# Patient Record
Sex: MALE | Race: AMERICAN INDIAN OR ALASKA NATIVE | NOT HISPANIC OR LATINO | URBAN - METROPOLITAN AREA
[De-identification: names, ages, dates, MRNs, and addresses within clinical notes are randomized per-mention and may not be internally consistent; named-entity substitution may affect disease eponyms.]

---

## 2023-01-19 ENCOUNTER — INPATIENT (INPATIENT)
Facility: HOSPITAL | Age: 78
LOS: 6 days | Discharge: HOME | End: 2023-01-26
Attending: INTERNAL MEDICINE | Admitting: INTERNAL MEDICINE
Payer: MEDICARE

## 2023-01-19 VITALS
TEMPERATURE: 98 F | SYSTOLIC BLOOD PRESSURE: 124 MMHG | OXYGEN SATURATION: 99 % | DIASTOLIC BLOOD PRESSURE: 60 MMHG | RESPIRATION RATE: 18 BRPM | HEART RATE: 52 BPM

## 2023-01-19 LAB
ACANTHOCYTES BLD QL SMEAR: SLIGHT — SIGNIFICANT CHANGE UP
ALBUMIN SERPL ELPH-MCNC: 3.5 G/DL — SIGNIFICANT CHANGE UP (ref 3.5–5.2)
ALP SERPL-CCNC: 78 U/L — SIGNIFICANT CHANGE UP (ref 30–115)
ALT FLD-CCNC: 8 U/L — SIGNIFICANT CHANGE UP (ref 0–41)
ANION GAP SERPL CALC-SCNC: 10 MMOL/L — SIGNIFICANT CHANGE UP (ref 7–14)
ANION GAP SERPL CALC-SCNC: 7 MMOL/L — SIGNIFICANT CHANGE UP (ref 7–14)
ANISOCYTOSIS BLD QL: SLIGHT — SIGNIFICANT CHANGE UP
APPEARANCE UR: CLEAR — SIGNIFICANT CHANGE UP
APTT BLD: 35.8 SEC — SIGNIFICANT CHANGE UP (ref 27–39.2)
AST SERPL-CCNC: 26 U/L — SIGNIFICANT CHANGE UP (ref 0–41)
BASE EXCESS BLDV CALC-SCNC: -4.6 MMOL/L — LOW (ref -2–3)
BASE EXCESS BLDV CALC-SCNC: -5.5 MMOL/L — LOW (ref -2–3)
BASOPHILS # BLD AUTO: 0 K/UL — SIGNIFICANT CHANGE UP (ref 0–0.2)
BASOPHILS NFR BLD AUTO: 0 % — SIGNIFICANT CHANGE UP (ref 0–1)
BILIRUB SERPL-MCNC: 0.4 MG/DL — SIGNIFICANT CHANGE UP (ref 0.2–1.2)
BILIRUB UR-MCNC: NEGATIVE — SIGNIFICANT CHANGE UP
BUN SERPL-MCNC: 18 MG/DL — SIGNIFICANT CHANGE UP (ref 10–20)
BUN SERPL-MCNC: 19 MG/DL — SIGNIFICANT CHANGE UP (ref 10–20)
CA-I SERPL-SCNC: 1.16 MMOL/L — SIGNIFICANT CHANGE UP (ref 1.15–1.33)
CA-I SERPL-SCNC: 1.18 MMOL/L — SIGNIFICANT CHANGE UP (ref 1.15–1.33)
CALCIUM SERPL-MCNC: 8.2 MG/DL — LOW (ref 8.4–10.4)
CALCIUM SERPL-MCNC: 8.3 MG/DL — LOW (ref 8.4–10.5)
CHLORIDE SERPL-SCNC: 91 MMOL/L — LOW (ref 98–110)
CHLORIDE SERPL-SCNC: 92 MMOL/L — LOW (ref 98–110)
CO2 SERPL-SCNC: 18 MMOL/L — SIGNIFICANT CHANGE UP (ref 17–32)
CO2 SERPL-SCNC: 21 MMOL/L — SIGNIFICANT CHANGE UP (ref 17–32)
COLOR SPEC: COLORLESS — SIGNIFICANT CHANGE UP
CREAT SERPL-MCNC: 2 MG/DL — HIGH (ref 0.7–1.5)
CREAT SERPL-MCNC: 2 MG/DL — HIGH (ref 0.7–1.5)
D DIMER BLD IA.RAPID-MCNC: 345 NG/ML DDU — HIGH
D DIMER BLD IA.RAPID-MCNC: 351 NG/ML DDU — HIGH
DIFF PNL FLD: NEGATIVE — SIGNIFICANT CHANGE UP
EGFR: 34 ML/MIN/1.73M2 — LOW
EGFR: 34 ML/MIN/1.73M2 — LOW
EOSINOPHIL # BLD AUTO: 0 K/UL — SIGNIFICANT CHANGE UP (ref 0–0.7)
EOSINOPHIL NFR BLD AUTO: 0 % — SIGNIFICANT CHANGE UP (ref 0–8)
GAS PNL BLDV: 116 MMOL/L — CRITICAL LOW (ref 136–145)
GAS PNL BLDV: 119 MMOL/L — CRITICAL LOW (ref 136–145)
GAS PNL BLDV: SIGNIFICANT CHANGE UP
GIANT PLATELETS BLD QL SMEAR: PRESENT — SIGNIFICANT CHANGE UP
GLUCOSE SERPL-MCNC: 107 MG/DL — HIGH (ref 70–99)
GLUCOSE SERPL-MCNC: 82 MG/DL — SIGNIFICANT CHANGE UP (ref 70–99)
GLUCOSE UR QL: NEGATIVE — SIGNIFICANT CHANGE UP
HCO3 BLDV-SCNC: 19 MMOL/L — LOW (ref 22–29)
HCO3 BLDV-SCNC: 20 MMOL/L — LOW (ref 22–29)
HCT VFR BLD CALC: 26.7 % — LOW (ref 42–52)
HCT VFR BLDA CALC: 28 % — LOW (ref 39–51)
HCT VFR BLDA CALC: 28 % — LOW (ref 39–51)
HGB BLD CALC-MCNC: 9.4 G/DL — LOW (ref 12.6–17.4)
HGB BLD CALC-MCNC: 9.4 G/DL — LOW (ref 12.6–17.4)
HGB BLD-MCNC: 9.4 G/DL — LOW (ref 14–18)
INR BLD: 1.02 RATIO — SIGNIFICANT CHANGE UP (ref 0.65–1.3)
KETONES UR-MCNC: NEGATIVE — SIGNIFICANT CHANGE UP
LACTATE BLDV-MCNC: 0.7 MMOL/L — SIGNIFICANT CHANGE UP (ref 0.5–2)
LACTATE BLDV-MCNC: 0.8 MMOL/L — SIGNIFICANT CHANGE UP (ref 0.5–2)
LEUKOCYTE ESTERASE UR-ACNC: NEGATIVE — SIGNIFICANT CHANGE UP
LIDOCAIN IGE QN: 66 U/L — HIGH (ref 7–60)
LYMPHOCYTES # BLD AUTO: 0.16 K/UL — LOW (ref 1.2–3.4)
LYMPHOCYTES # BLD AUTO: 4.4 % — LOW (ref 20.5–51.1)
MACROCYTES BLD QL: SLIGHT — SIGNIFICANT CHANGE UP
MANUAL SMEAR VERIFICATION: SIGNIFICANT CHANGE UP
MCHC RBC-ENTMCNC: 32 PG — HIGH (ref 27–31)
MCHC RBC-ENTMCNC: 35.2 G/DL — SIGNIFICANT CHANGE UP (ref 32–37)
MCV RBC AUTO: 90.8 FL — SIGNIFICANT CHANGE UP (ref 80–94)
MONOCYTES # BLD AUTO: 0.49 K/UL — SIGNIFICANT CHANGE UP (ref 0.1–0.6)
MONOCYTES NFR BLD AUTO: 13.3 % — HIGH (ref 1.7–9.3)
NEUTROPHILS # BLD AUTO: 3.01 K/UL — SIGNIFICANT CHANGE UP (ref 1.4–6.5)
NEUTROPHILS NFR BLD AUTO: 82.3 % — HIGH (ref 42.2–75.2)
NITRITE UR-MCNC: NEGATIVE — SIGNIFICANT CHANGE UP
NT-PROBNP SERPL-SCNC: HIGH PG/ML (ref 0–300)
PCO2 BLDV: 33 MMHG — LOW (ref 42–55)
PCO2 BLDV: 36 MMHG — LOW (ref 42–55)
PH BLDV: 7.36 — SIGNIFICANT CHANGE UP (ref 7.32–7.43)
PH BLDV: 7.37 — SIGNIFICANT CHANGE UP (ref 7.32–7.43)
PH UR: 6.5 — SIGNIFICANT CHANGE UP (ref 5–8)
PLAT MORPH BLD: NORMAL — SIGNIFICANT CHANGE UP
PLATELET # BLD AUTO: 97 K/UL — LOW (ref 130–400)
PLATELET COUNT - ESTIMATE: ABNORMAL
PO2 BLDV: 60 MMHG — SIGNIFICANT CHANGE UP
PO2 BLDV: 76 MMHG — SIGNIFICANT CHANGE UP
POIKILOCYTOSIS BLD QL AUTO: SLIGHT — SIGNIFICANT CHANGE UP
POLYCHROMASIA BLD QL SMEAR: SLIGHT — SIGNIFICANT CHANGE UP
POTASSIUM BLDV-SCNC: 4 MMOL/L — SIGNIFICANT CHANGE UP (ref 3.5–5.1)
POTASSIUM BLDV-SCNC: 4.3 MMOL/L — SIGNIFICANT CHANGE UP (ref 3.5–5.1)
POTASSIUM SERPL-MCNC: 3.9 MMOL/L — SIGNIFICANT CHANGE UP (ref 3.5–5)
POTASSIUM SERPL-MCNC: 4.3 MMOL/L — SIGNIFICANT CHANGE UP (ref 3.5–5)
POTASSIUM SERPL-SCNC: 3.9 MMOL/L — SIGNIFICANT CHANGE UP (ref 3.5–5)
POTASSIUM SERPL-SCNC: 4.3 MMOL/L — SIGNIFICANT CHANGE UP (ref 3.5–5)
PROT SERPL-MCNC: 6 G/DL — SIGNIFICANT CHANGE UP (ref 6–8)
PROT UR-MCNC: SIGNIFICANT CHANGE UP
PROTHROM AB SERPL-ACNC: 11.6 SEC — SIGNIFICANT CHANGE UP (ref 9.95–12.87)
RBC # BLD: 2.94 M/UL — LOW (ref 4.7–6.1)
RBC # FLD: 13 % — SIGNIFICANT CHANGE UP (ref 11.5–14.5)
RBC BLD AUTO: ABNORMAL
SAO2 % BLDV: 92 % — SIGNIFICANT CHANGE UP
SAO2 % BLDV: 97.1 % — SIGNIFICANT CHANGE UP
SARS-COV-2 RNA SPEC QL NAA+PROBE: DETECTED
SODIUM SERPL-SCNC: 119 MMOL/L — CRITICAL LOW (ref 135–146)
SODIUM SERPL-SCNC: 120 MMOL/L — LOW (ref 135–146)
SP GR SPEC: 1.01 — SIGNIFICANT CHANGE UP (ref 1.01–1.03)
TROPONIN T SERPL-MCNC: <0.01 NG/ML — SIGNIFICANT CHANGE UP
UROBILINOGEN FLD QL: SIGNIFICANT CHANGE UP
WBC # BLD: 3.66 K/UL — LOW (ref 4.8–10.8)
WBC # FLD AUTO: 3.66 K/UL — LOW (ref 4.8–10.8)

## 2023-01-19 PROCEDURE — 70450 CT HEAD/BRAIN W/O DYE: CPT | Mod: 26,MA

## 2023-01-19 PROCEDURE — 93010 ELECTROCARDIOGRAM REPORT: CPT

## 2023-01-19 PROCEDURE — 71275 CT ANGIOGRAPHY CHEST: CPT | Mod: 26,MA

## 2023-01-19 PROCEDURE — 74177 CT ABD & PELVIS W/CONTRAST: CPT | Mod: 26,MA

## 2023-01-19 PROCEDURE — 71045 X-RAY EXAM CHEST 1 VIEW: CPT | Mod: 26

## 2023-01-19 PROCEDURE — 99223 1ST HOSP IP/OBS HIGH 75: CPT

## 2023-01-19 PROCEDURE — 99285 EMERGENCY DEPT VISIT HI MDM: CPT | Mod: CS,GC

## 2023-01-19 RX ORDER — PANTOPRAZOLE SODIUM 20 MG/1
40 TABLET, DELAYED RELEASE ORAL
Refills: 0 | Status: DISCONTINUED | OUTPATIENT
Start: 2023-01-19 | End: 2023-01-26

## 2023-01-19 RX ORDER — SODIUM CHLORIDE 9 MG/ML
1000 INJECTION INTRAMUSCULAR; INTRAVENOUS; SUBCUTANEOUS
Refills: 0 | Status: DISCONTINUED | OUTPATIENT
Start: 2023-01-19 | End: 2023-01-20

## 2023-01-19 RX ORDER — SODIUM CHLORIDE 9 MG/ML
1000 INJECTION INTRAMUSCULAR; INTRAVENOUS; SUBCUTANEOUS ONCE
Refills: 0 | Status: COMPLETED | OUTPATIENT
Start: 2023-01-19 | End: 2023-01-19

## 2023-01-19 RX ADMIN — SODIUM CHLORIDE 1000 MILLILITER(S): 9 INJECTION INTRAMUSCULAR; INTRAVENOUS; SUBCUTANEOUS at 12:51

## 2023-01-19 RX ADMIN — SODIUM CHLORIDE 75 MILLILITER(S): 9 INJECTION INTRAMUSCULAR; INTRAVENOUS; SUBCUTANEOUS at 16:48

## 2023-01-19 NOTE — CONSULT NOTE ADULT - ATTENDING COMMENTS
IMPRESSION:  Hyponatremia  Covid +  Pancytopenia  KOBE vs CKD   ?Brain Bleed  HO CAD  HO CHF  HO CAD    Impression and plan are my own.

## 2023-01-19 NOTE — CONSULT NOTE ADULT - SUBJECTIVE AND OBJECTIVE BOX
**STROKE CONSULT NOTE**    The patient is Cantonese speaking.  used ID#[id number]    77y Male with PMHx of CHF unknown Ef, hypertension, hyperlipidemia, CAD, CKD3, hemorrhagic stroke 2013? L sided aneurysm? presenting for evaluation of lightheadedness, shortness of breath and vomiting for past 2 days.   He reports feeling shortness of breath and feeling lightheaded with ambulation. Symptoms associated with nausea, vomiting and mild bifrontal HA. The patient was tested positive for COVID 2 days ago.   Neurology team consulted for abnormal radiological findings. Concern for ICH.      T(C): 36.7 (01-19-23 @ 10:00), Max: 36.7 (01-19-23 @ 10:00)  HR: 52 (01-19-23 @ 10:00) (52 - 52)  BP: 124/60 (01-19-23 @ 10:00) (124/60 - 124/60)  RR: 18 (01-19-23 @ 10:00) (18 - 18)  SpO2: 99% (01-19-23 @ 10:00) (99% - 99%)    PAST MEDICAL & SURGICAL HISTORY:  HTN (hypertension)      CAD (coronary artery disease)      Acute CHF      Cerebrovascular accident (CVA)      Chronic kidney disease, unspecified CKD stage          FAMILY HISTORY:      SOCIAL HISTORY:   Patient lives with daughter  Smoking status None  Drinking: None  Drug Use:  None      ROS:   Constitutional: + fatigue  Eyes: No eye pain, visual disturbances, or discharge  ENMT:  No difficulty hearing, tinnitus; No sinus or throat pain  Neck: No pain or stiffness  Respiratory: + cough,  Cardiovascular: +chest pain, palpitations, shortness of breath, or leg swelling  Gastrointestinal: No abdominal pain. +nausea, vomiting or hematemesis  Genitourinary: No dysuria, frequency, hematuria or incontinence  Neurological: As per HPI  Skin: No itching, burning, rashes or lesions   Endocrine: No heat or cold intolerance; No hair loss  Musculoskeletal: No joint pain or swelling; No muscle, back or extremity pain  Heme/Lymph: No easy bruising or bleeding gums    MEDICATIONS  (STANDING):  pantoprazole    Tablet 40 milliGRAM(s) Oral before breakfast  sodium chloride 0.9%. 1000 milliLiter(s) (75 mL/Hr) IV Continuous <Continuous>    MEDICATIONS  (PRN):    Allergies    No Known Allergies    Intolerances      Vital Signs Last 24 Hrs  T(C): 36.7 (19 Jan 2023 10:00), Max: 36.7 (19 Jan 2023 10:00)  T(F): 98 (19 Jan 2023 10:00), Max: 98 (19 Jan 2023 10:00)  HR: 52 (19 Jan 2023 10:00) (52 - 52)  BP: 124/60 (19 Jan 2023 10:00) (124/60 - 124/60)  BP(mean): --  RR: 18 (19 Jan 2023 10:00) (18 - 18)  SpO2: 99% (19 Jan 2023 10:00) (99% - 99%)    Parameters below as of 19 Jan 2023 10:00  Patient On (Oxygen Delivery Method): room air        Neurologic:  -Mental status: Awake, alert, oriented to person, place, and time.  -Cranial nerves:   II: Visual fields are full to confrontation.  III, IV, VI: Extraocular movements are intact without nystagmus. Pupils equally round and reactive to light  V:  Facial sensation V1-V3 equal and intact   VII: Face is symmetric with normal eye closure and smile  VIII: Hearing is bilaterally intact to finger rub  IX, X: Uvula is midline and soft palate rises symmetrically  XI: Head turning and shoulder shrug are intact.  XII: Tongue protrudes midline  Motor: Normal bulk and tone. No pronator drift. Strength bilateral upper extremity 5/5, bilateral lower extremities 5/5.  Sensation: Intact to light touch bilaterally  Coordination: No dysmetria on finger-to-nose   Reflexes: Downgoing toes bilaterally   Gait: Not assessed    NIHSS:0    Fingerstick Blood Glucose: CAPILLARY BLOOD GLUCOSE        LABS:                        9.4    3.66  )-----------( 97       ( 19 Jan 2023 11:00 )             26.7     01-19    119<LL>  |  91<L>  |  19  ----------------------------<  107<H>  4.3   |  18  |  2.0<H>    Ca    8.3<L>      19 Jan 2023 11:00    TPro  6.0  /  Alb  3.5  /  TBili  0.4  /  DBili  x   /  AST  26  /  ALT  8   /  AlkPhos  78  01-19    PT/INR - ( 19 Jan 2023 12:30 )   PT: 11.60 sec;   INR: 1.02 ratio         PTT - ( 19 Jan 2023 12:30 )  PTT:35.8 sec  CARDIAC MARKERS ( 19 Jan 2023 11:00 )  x     / <0.01 ng/mL / x     / x     / x              RADIOLOGY & ADDITIONAL STUDIES:  CT Head No Cont (01.19.23 @ 11:01) >  Age indeterminate right basal ganglia infarct.    Rounded hyperdensity noted as noted within the left subinsular region   potentially reflecting calcification though component of acute hemorrhage   cannot entirely be excluded. Correlation with prior examinations is   advised. Further evaluation with brain MRI can be obtained.    Moderate chronic microvascular type changes as well as chronic infarcts   involving the right occipital lobe and right cerebellar hemisphere.

## 2023-01-19 NOTE — ED PROVIDER NOTE - NSICDXPASTMEDICALHX_GEN_ALL_CORE_FT
PAST MEDICAL HISTORY:  Acute CHF     CAD (coronary artery disease)     Cerebrovascular accident (CVA)     Chronic kidney disease, unspecified CKD stage     HTN (hypertension)

## 2023-01-19 NOTE — CONSULT NOTE ADULT - SUBJECTIVE AND OBJECTIVE BOX
HPI:  Mr. Estevez 77 year old cantaneous speaking males with PMHx of CHF unknown ef, hypertension, hyperlipidemia, CAD, CKD3, CVA 8 years ago presenting for evaluation of lightheadedness, shortness of breath and vomiting for past 2 days. Daughter reports patient was seeing a cardiologist oupatient about a month ago for worsening LE bilateral edema and was diagnosed with new onset chf. Over the past week, he began feeling nausea with associated intermittent vomiting and decrease PO intake. Outpatient he was diagnosed with COVID and Rx for paxlovid, first dose was yesterday. Today he had 3 episodes of nonbloody nonbilious vomiting.  No fevers, chills, chest pain, abdominal pain, seizures, mental status changes.     In the ED, vitals BP: 124/60 HR: 52 RR: 18 Spo2 98 % on RA, afebrile. Labs significant for Na 119 (no baseline labs), creat 2.0, BNP 15k. COVID PCR positive. CXR: Left basilar consolidation. Right-sided fibrotic changes. CT chest/abd/pelvis: Bilateral peribronchial thickening. Left basilar consolidation with trace pleural effusion.Predominantly right upper lobe fibrotic changes with architectural distortion.Bilateral calcified pleural plaques.Circumferential distal esophageal wall thickening. Lobulated liver contour suggesting early cirrhotic changes.Trace abdominopelvic ascites, nonspecific. CT head: Age indeterminate right basal ganglia infarct. Rounded hyperdensity noted as noted within the left subinsular region potentially reflecting calcification though component of acute hemorrhage cannot entirely be excluded. Moderate chronic microvascular type changes as well as chronic infarcts involving the right occipital lobe and right cerebellar hemisphere. Patient given 1 L NS and admitted to ICU For further management.     Patient evaluated by NCC: hyperdense regions appear to be less likely hemorrhage and more likely calcification. The patient does NOT have any neurological deficits at the present moment on physical exam.  Recommend getting a second CT head non con 6hr post original scan on presentation and we will follow up imaging to assess for further recs.          (2023 15:56)        PAST MEDICAL & SURGICAL HISTORY:  HTN (hypertension)      CAD (coronary artery disease)      Acute CHF      Cerebrovascular accident (CVA)      Chronic kidney disease, unspecified CKD stage          Home Medications:  furosemide 20 mg oral tablet: 1 tab(s) orally once a day (2023 16:52)  hydrALAZINE 25 mg oral tablet: 1 tab(s) orally 3 times a day (2023 16:53)  losartan 25 mg oral tablet: 1 tab(s) orally once a day (2023 16:53)  pantoprazole 40 mg oral delayed release tablet: 1 tab(s) orally once a day (2023 16:53)  Verquvo 2.5 mg oral tablet: 1 tab(s) orally once a day (2023 16:52)  Vitamin B12 500 mcg oral tablet:  (2023 16:52)      Allergies    No Known Allergies    Intolerances        ROS:  [x] A ten-point review of systems is negative except as noted   [  ] Due to altered mental status/intubation, subjective information were not able to be obtained from the patient. History was obtained, to the extent possible, from review of the chart and collateral sources of information    MEDICATIONS  (STANDING):  pantoprazole    Tablet 40 milliGRAM(s) Oral before breakfast  sodium chloride 0.9%. 1000 milliLiter(s) (75 mL/Hr) IV Continuous <Continuous>    MEDICATIONS  (PRN):      ICU Vital Signs Last 24 Hrs  T(C): 38 (2023 17:59), Max: 38.1 (2023 17:00)  T(F): 100.4 (2023 17:59), Max: 100.5 (2023 17:00)  HR: 88 (2023 17:59) (52 - 88)  BP: 163/77 (2023 17:59) (124/60 - 163/77)  BP(mean): --  ABP: --  ABP(mean): --  RR: 18 (2023 17:59) (18 - 18)  SpO2: 99% (2023 17:59) (97% - 99%)    O2 Parameters below as of 2023 17:59  Patient On (Oxygen Delivery Method): room air            I&O's Detail      CBC Full  -  ( 2023 11:00 )  WBC Count : 3.66 K/uL  RBC Count : 2.94 M/uL  Hemoglobin : 9.4 g/dL  Hematocrit : 26.7 %  Platelet Count - Automated : 97 K/uL  Mean Cell Volume : 90.8 fL  Mean Cell Hemoglobin : 32.0 pg  Mean Cell Hemoglobin Concentration : 35.2 g/dL  Auto Neutrophil # : 3.01 K/uL  Auto Lymphocyte # : 0.16 K/uL  Auto Monocyte # : 0.49 K/uL  Auto Eosinophil # : 0.00 K/uL  Auto Basophil # : 0.00 K/uL  Auto Neutrophil % : 82.3 %  Auto Lymphocyte % : 4.4 %  Auto Monocyte % : 13.3 %  Auto Eosinophil % : 0.0 %  Auto Basophil % : 0.0 %        119<LL>  |  91<L>  |  19  ----------------------------<  107<H>  4.3   |  18  |  2.0<H>    Ca    8.3<L>      2023 11:00    TPro  6.0  /  Alb  3.5  /  TBili  0.4  /  DBili  x   /  AST  26  /  ALT  8   /  AlkPhos  78      CARDIAC MARKERS ( 2023 11:00 )  x     / <0.01 ng/mL / x     / x     / x          Urinalysis Basic - ( 2023 18:18 )    Color: Colorless / Appearance: Clear / S.012 / pH: x  Gluc: x / Ketone: Negative  / Bili: Negative / Urobili: <2 mg/dL   Blood: x / Protein: Trace / Nitrite: Negative   Leuk Esterase: Negative / RBC: x / WBC x   Sq Epi: x / Non Sq Epi: x / Bacteria: x        On PE:    Awake and alert  PERRL  EOMI  No droop  No drift  PLUMMER - good strength  Follows complex commands      Radiology:  < from: CT Head No Cont (23 @ 11:01) >  Age indeterminate right basal ganglia infarct.    Rounded hyperdensity noted as noted within the left subinsular region   potentially reflecting calcification though component of acute hemorrhage   cannot entirely be excluded. Correlation with prior examinations is   advised. Further evaluation with brain MRI can be obtained.    Moderate chronic microvascular type changes as well as chronic infarcts   involving the right occipital lobe and right cerebellar hemisphere.    < end of copied text >  < from: CT Head No Cont (23 @ 15:58) >  No significant change in comparison to recent same day head CT    Redemonstration of a rounded hyperdensity within the left subinsular   region potentially reflecting calcification, acute hemorrhage or possibly   an aneurysm. A CTA in addition to MRI of the brain is recommended for   further evaluation.    Age indeterminate right basal ganglia infarct right occipital and right   cerebellar hemisphere.    < end of copied text >      Assessment:  As above  Plan:  MRI Brain if no contraindications

## 2023-01-19 NOTE — H&P ADULT - NSHPLABSRESULTS_GEN_ALL_CORE
9.4    3.66  )-----------( 97       ( 19 Jan 2023 11:00 )             26.7     01-19    119<LL>  |  91<L>  |  19  ----------------------------<  107<H>  4.3   |  18  |  2.0<H>    Ca    8.3<L>      19 Jan 2023 11:00    TPro  6.0  /  Alb  3.5  /  TBili  0.4  /  DBili  x   /  AST  26  /  ALT  8   /  AlkPhos  78  01-19    PT/INR - ( 19 Jan 2023 12:30 )   PT: 11.60 sec;   INR: 1.02 ratio         PTT - ( 19 Jan 2023 12:30 )  PTT:35.8 sec      Troponin T, Serum: <0.01 ng/mL (01-19-23 @ 11:00)    [id number]  CARDIAC MARKERS ( 19 Jan 2023 11:00 )  x     / <0.01 ng/mL / x     / x     / x          < from: CT Head No Cont (01.19.23 @ 15:58) >      IMPRESSION:  No significant change in comparison to recent same day head CT    Redemonstration of a rounded hyperdensity within the left subinsular   region potentially reflecting calcification, acute hemorrhage or possibly   an aneurysm. A CTA in addition to MRI of the brain is recommended for   further evaluation.    Age indeterminate right basal ganglia infarct right occipital and right   cerebellar hemisphere.    < from: CT Abdomen and Pelvis w/ IV Cont (01.19.23 @ 11:19) >      CHEST:    No pulmonary embolus.  Bilateral peribronchial thickening. Left basilar consolidation with trace   pleural effusion.  Predominantly right upper lobe fibrotic changes with architectural   distortion.  Bilateral calcified pleural plaques.    ABDOMEN AND PELVIS:    Circumferential distal esophageal wall thickening. Consider further   evaluation with endoscopy as clinically warranted.    Lobulated liver contour suggesting early cirrhotic changes.    Trace abdominopelvic ascites, nonspecific.    < end of copied text >

## 2023-01-19 NOTE — CONSULT NOTE ADULT - ATTENDING COMMENTS
Pt is a 78 yo M with PMHx of CHF, HTN, HLD, CAD, CKD, prior hemorrhagic stroke?, who presented with ARF and covid. Stroke neurology consulted for CT head finding of left subinsular hyperdensity. Stable on f/u CT head. CTA head/neck pending. Suspect thrombosed/calcified aneurysm given location. OK for DVT ppx. NS recs appreciated. Pt is a 76 yo M with PMHx of CHF, HTN, HLD, CAD, CKD, prior hemorrhagic stroke?, who presented with ARF and covid. Stroke neurology consulted for CT head finding of left subinsular hyperdensity. Stable on f/u CT head. CTA head/neck pending. If unable to obtain 2/2 renal function, recommend MRI brain/MRA head without contrast. Suspect thrombosed/calcified aneurysm given location. OK for DVT ppx. NS recs appreciated.

## 2023-01-19 NOTE — H&P ADULT - NSHPPHYSICALEXAM_GEN_ALL_CORE
GENERAL: NAD, lying in bed comfortably  HEAD:  Atraumatic, Normocephalic  EYES: EOMI, conjunctiva and sclera clear  ENT: dry mucous membranes  CHEST/LUNG: Clear to auscultation bilaterally;   HEART: Regular rate and rhythm; No murmurs, rubs, or gallops  ABDOMEN: Soft, Nontender, Nondistended.   EXTREMITIES:  No clubbing, cyanosis, or edema  NERVOUS SYSTEM:  Alert & Oriented X3

## 2023-01-19 NOTE — CONSULT NOTE ADULT - ASSESSMENT
77y Male with PMHx of CHF unknown Ef, hypertension, hyperlipidemia, CAD, CKD3, hemorrhagic stroke 2013? L sided aneurysm? presenting for evaluation of lightheadedness, shortness of breath and vomiting for past 2 days. COVID+, with AHRF, currently admitted under care of medicine team. Neurology team consulted for abnormal radiological findings. Concern for ICH given area of hypodensity on CTH. Neuro exam seems to be non focal.     Impression:  #Rounded hyperdensity within the left subinsular region, r/o calcification vs acute hemorrhage   Recommend Repeat CTH in 4-6 H to ensure stability  CTA head and neck to rule out underlying aneurysm (calcified given prior hx of ICH)  If evidence of cerebral aneurysm then recommend neuroendovascular evaluation  IF CTA (-) for aneurysm, then recommend MR brain non contrast for better characterization of the left subinsular hyperdensity    Case discussed with neurovascular attending

## 2023-01-19 NOTE — CONSULT NOTE ADULT - SUBJECTIVE AND OBJECTIVE BOX
Patient is a 77y old  Male who presents with a chief complaint of Hyponatremia (19 Jan 2023 14:20)      HPI:  Patient is a 77-year-old male history of CHF, hypertension, hyperlipidemia, CAD, CKD, CVA 8 years ago presenting for evaluation of lightheadedness, shortness of breath and vomiting.  Per daughter, patient was diagnosed with COVID  2 days ago and started on Paxlovid, first dose was yesterday.  She notes that he has lightheadedness and shortness of breath for the last 2 days.  Today he had 3 episodes of nonbloody nonbilious vomiting.  No fevers, chills, chest pain, abdominal pain. In the ED patient found to be hyponatremic. CT brain with     PAST MEDICAL & SURGICAL HISTORY:  HTN (hypertension)      CAD (coronary artery disease)      Acute CHF      Cerebrovascular accident (CVA)      Chronic kidney disease, unspecified CKD stage          SOCIAL HX:   Smoking                         ETOH                            Other    FAMILY HISTORY:  :  No known cardiovacular family hisotry     Review Of Systems:     All ROS are negative except per HPI       Allergies    No Known Allergies    Intolerances          PHYSICAL EXAM    ICU Vital Signs Last 24 Hrs  T(C): 36.7 (19 Jan 2023 10:00), Max: 36.7 (19 Jan 2023 10:00)  T(F): 98 (19 Jan 2023 10:00), Max: 98 (19 Jan 2023 10:00)  HR: 52 (19 Jan 2023 10:00) (52 - 52)  BP: 124/60 (19 Jan 2023 10:00) (124/60 - 124/60)  BP(mean): --  ABP: --  ABP(mean): --  RR: 18 (19 Jan 2023 10:00) (18 - 18)  SpO2: 99% (19 Jan 2023 10:00) (99% - 99%)    O2 Parameters below as of 19 Jan 2023 10:00  Patient On (Oxygen Delivery Method): room air            CONSTITUTIONAL:  Well nourished.   NAD    ENT:   Airway patent,   Mouth with normal mucosa.   No thrush      CARDIAC:   Normal rate,   Regular rhythm.    No edema      Vascular:   normal systolic impulse  no bruits    RESPIRATORY:   No wheezing  Bilateral BS   Not tachypneic,  No use of accessory muscles    GASTROINTESTINAL:  Abdomen soft,   Non-tender,   No guarding,   + BS      NEUROLOGICAL:   Alert and oriented   No motor deficits.    SKIN:   Skin normal color for race,   No evidence of rash.      HEME LYMPH: .  No cervical  lymphadenopathy.  No inguinal lymphadenopathy              LABS:                          9.4    3.66  )-----------( 97       ( 19 Jan 2023 11:00 )             26.7                                               01-19    119<LL>  |  91<L>  |  19  ----------------------------<  107<H>  4.3   |  18  |  2.0<H>    Ca    8.3<L>      19 Jan 2023 11:00    TPro  6.0  /  Alb  3.5  /  TBili  0.4  /  DBili  x   /  AST  26  /  ALT  8   /  AlkPhos  78  01-19      PT/INR - ( 19 Jan 2023 12:30 )   PT: 11.60 sec;   INR: 1.02 ratio         PTT - ( 19 Jan 2023 12:30 )  PTT:35.8 sec                                           CARDIAC MARKERS ( 19 Jan 2023 11:00 )  x     / <0.01 ng/mL / x     / x     / x                                                LIVER FUNCTIONS - ( 19 Jan 2023 11:00 )  Alb: 3.5 g/dL / Pro: 6.0 g/dL / ALK PHOS: 78 U/L / ALT: 8 U/L / AST: 26 U/L / GGT: x                                                                                                                                       X-Rays reviewed                                                                                     ECHO    CXR interpreted by me     MEDICATIONS  (STANDING):    MEDICATIONS  (PRN):         Patient is a 77y old  Male who presents with a chief complaint of Hyponatremia (19 Jan 2023 14:20)      HPI:  Patient is a 77-year-old male history of CHF, hypertension, hyperlipidemia, CAD, CKD, CVA 8 years ago presenting for evaluation of lightheadedness, shortness of breath and vomiting.  Per daughter, patient was diagnosed with COVID  2 days ago and started on Paxlovid, first dose was yesterday.  She notes that he has lightheadedness and shortness of breath for the last 2 days.  Today he had 3 episodes of nonbloody nonbilious vomiting.  No fevers, chills, chest pain, abdominal pain. Upon presentation to the ED today, pan CT was performed and the CTH was notable for bilateral basal ganglia calcification versus hemorrhage. Patient is also hyponatremic to 119.     PAST MEDICAL & SURGICAL HISTORY:  HTN (hypertension)      CAD (coronary artery disease)      Acute CHF      Cerebrovascular accident (CVA)      Chronic kidney disease, unspecified CKD stage      FAMILY HISTORY:  :  No known cardiovacular family hisotry     Review Of Systems:     All ROS are negative except per HPI       Allergies    No Known Allergies    Intolerances          PHYSICAL EXAM    ICU Vital Signs Last 24 Hrs  T(C): 36.7 (19 Jan 2023 10:00), Max: 36.7 (19 Jan 2023 10:00)  T(F): 98 (19 Jan 2023 10:00), Max: 98 (19 Jan 2023 10:00)  HR: 52 (19 Jan 2023 10:00) (52 - 52)  BP: 124/60 (19 Jan 2023 10:00) (124/60 - 124/60)  BP(mean): --  ABP: --  ABP(mean): --  RR: 18 (19 Jan 2023 10:00) (18 - 18)  SpO2: 99% (19 Jan 2023 10:00) (99% - 99%)    O2 Parameters below as of 19 Jan 2023 10:00  Patient On (Oxygen Delivery Method): room air            CONSTITUTIONAL:  NAD    ENT:   Airway patent,   Mouth with normal mucosa.   No thrush  on room air    CARDIAC:   Normal rate,   Regular rhythm.    ++ edema      Vascular:   normal systolic impulse  no bruits    RESPIRATORY:   No wheezing  Bilateral BS   Not tachypneic,  No use of accessory muscles    GASTROINTESTINAL:  Abdomen soft,   Non-tender,   No guarding,   + BS      NEUROLOGICAL:   Alert and oriented   No motor deficits.    SKIN:   Skin normal color for race,   No evidence of rash.      HEME LYMPH: .  No cervical  lymphadenopathy.  No inguinal lymphadenopathy              LABS:                          9.4    3.66  )-----------( 97       ( 19 Jan 2023 11:00 )             26.7                                               01-19    119<LL>  |  91<L>  |  19  ----------------------------<  107<H>  4.3   |  18  |  2.0<H>    Ca    8.3<L>      19 Jan 2023 11:00    TPro  6.0  /  Alb  3.5  /  TBili  0.4  /  DBili  x   /  AST  26  /  ALT  8   /  AlkPhos  78  01-19      PT/INR - ( 19 Jan 2023 12:30 )   PT: 11.60 sec;   INR: 1.02 ratio         PTT - ( 19 Jan 2023 12:30 )  PTT:35.8 sec                                           CARDIAC MARKERS ( 19 Jan 2023 11:00 )  x     / <0.01 ng/mL / x     / x     / x                                                LIVER FUNCTIONS - ( 19 Jan 2023 11:00 )  Alb: 3.5 g/dL / Pro: 6.0 g/dL / ALK PHOS: 78 U/L / ALT: 8 U/L / AST: 26 U/L / GGT: x                                                                                                                                       X-Rays reviewed                                                                                     ECHO    CXR interpreted by me     MEDICATIONS  (STANDING):    MEDICATIONS  (PRN):

## 2023-01-19 NOTE — ED PROVIDER NOTE - NS ED MD DISPO DIVISION
Activity  • For the rest of the day, do NOT:  • Work  • Stay alone  • Drive a car   • Operate electrical/power tools or appliances  • Drink alcohol  • Sign any legal papers  • Apply heat to the injection site    · You may:  · Apply ice to the injection site for up to 15 minutes at a time for comfort  · Resume activity as tolerated today  • Resume your pre-procedure activity or restrictions tomorrow     Dressing Care  • Keep injection site clean and dry.     Bathing  • You may shower tomorrow and bathe in two days.    Diet  • Resume your pre-procedure diet today.      Medication  • Continue home medications, unless otherwise instructed.    Follow Up  • We will call you tomorrow for your pain diary to evaluate the effectiveness of the procedure and review the plan.        Call Dr. Bowers office at [phone] if you have the following:  • Drainage, increase in redness and/or swelling from the injection site.  • Temperature above 101 degrees.   • Numbness or weakness of your legs or arms, different than before the injection.  • Severe headache.       [facility]

## 2023-01-19 NOTE — ED PROVIDER NOTE - PROGRESS NOTE DETAILS
MS- Radiology concerned for possible hemorrhage vs calcifications. Recommending MRI head. MS- Case discussed with neurosurgery PA who recommended patient have repeat head CT in a few hours MS- Neurology consulted via Teams, they recommend PT/INR and neurosurgery consult. Will come evaluate patient. MS- Case discussed with neuro crit [phone], who will come to evaluate patient

## 2023-01-19 NOTE — CONSULT NOTE ADULT - SUBJECTIVE AND OBJECTIVE BOX
HPI: Patient presenting with CC of SOB, Nausea and vomiting X 1 week.     Patient states that a month ago he began having issues with worsening lower extremity edema bilaterally and typically follows up with his cardiology outpatient for his hx of CHF. Over the past week, he began feeling nausea with associated intermittent vomiting and decrease PO intake. Outpatient he was diagnosed with COVID and Rx for paxlovid.     Upon presentation to the ED today, pan CT was performed and the CTH was notable for bilateral basal ganglia calcification versus hemorrhage. Patient is also hyponatremic to 119.  NCCU was called for evaluation.     Admits to chronic headaches intermittently and mild headache today that is within confines of his typical symptoms.         Past Medical History, Family History, Social History:  PAST MEDICAL & SURGICAL HISTORY:  HTN (hypertension)  CAD (coronary artery disease)  CHF  Cerebrovascular accident (CVA)  Chronic kidney disease, unspecified CKD stage  COPD  Gout  GERD  HLD  DM       Review of Systems:  Constitutional:  no fevers, chills, or new weakness  Eyes: No double vision, no change in visual acuity, no blurry vision, no occular discharge  Neurologic: +mild headache, no vertigo, no paresthesias   Ears, nose, mouth throat:  No ottorrhea. No change in hearing, No anosmia, No oral lesions, No sore throat  Cardiovascular: No palpitations, no chest pain  Respiratory: + shortness of breath, + intermittent Cough  Gastrointestinal: +nausea, + vomiting  Genitourinary: No Frequency, No Dysuria, no Hematuria  Musculoskeletal: No muscle wasting, No new weakness  Integumentary: Denies new skin lesions  Endocrine: + history of DM, denies history of thyroid disease, No heat or cold intolerance  Allergic / Immune: No active allergies unless otherwise specified in medical chart  All other systems reviewed and are negative      Physical Exam:  Constitutional: NAD  Neuro  * Mental Status:  GCS 15:  E(4), V(5), M(6).  Awake, alert, oriented to conversation.  * Cranial Nerves: Cnii-Cnxii grossly intact. PERRL, EOMI, tongue midline, no gaze deviation  * Motor: RUE 5/5, LUE 5/5, RLE 5/5, LLE 5/5  * Sensory: Sensation intact to light touch    Cardiovascular: Sinus tachycardia, heart sounds prominent   Eyes: PERRL EOMI   ENT: No JVD, Trachea Midline.  Respiratory: good air excursion in bilateral lung fields, + crackles RUL  Gastrointestinal: Soft, nontender, mild distension   Genitourinary: [ x ] No Wong.   Musculoskeletal: No muscle wasting noted,  bilateral pitting edema in lower legs worse in pedal aspect, no appreciable calf tenderness.  Skin:  CDI   Hematologic / Lymph / Immunologic: No bleeding from IV sites or wounds, No Hives or allergic type skin lesions      Vitals:  Vital Signs Last 24 Hrs  T(C): 36.7 (19 Jan 2023 10:00), Max: 36.7 (19 Jan 2023 10:00)  T(F): 98 (19 Jan 2023 10:00), Max: 98 (19 Jan 2023 10:00)  HR: 52 (19 Jan 2023 10:00) (52 - 52)  BP: 124/60 (19 Jan 2023 10:00) (124/60 - 124/60)  RR: 18 (19 Jan 2023 10:00) (18 - 18)  SpO2: 99% (19 Jan 2023 10:00) (99% - 99%)      Labs & Radiology:                        9.4    3.66  )-----------( 97       ( 19 Jan 2023 11:00 )             26.7       01-19    119<LL>  |  91<L>  |  19  ----------------------------<  107<H>  4.3   |  18  |  2.0<H>    Ca    8.3<L>      19 Jan 2023 11:00    TPro  6.0  /  Alb  3.5  /  TBili  0.4  /  DBili  x   /  AST  26  /  ALT  8   /  AlkPhos  78  01-19      LIVER FUNCTIONS - ( 19 Jan 2023 11:00 )  Alb: 3.5 g/dL / Pro: 6.0 g/dL / ALK PHOS: 78 U/L / ALT: 8 U/L / AST: 26 U/L / GGT: x             CARDIAC MARKERS ( 19 Jan 2023 11:00 )  x     / <0.01 ng/mL / x     / x     / x            PT/INR - ( 19 Jan 2023 12:30 )   PT: 11.60 sec;   INR: 1.02 ratio         PTT - ( 19 Jan 2023 12:30 )  PTT:35.8 sec        < from: CT Head No Cont (01.19.23 @ 11:01) >  ACC: [id number] EXAM:  CT BRAIN   ORDERED BY: LUIS ALCALA     PROCEDURE DATE:  01/19/2023          INTERPRETATION:  CLINICAL INDICATION: Lightheadedness.    Technique: CT of the head was performed without contrast.    Multiple contiguous axial images were acquired from the skullbase to the   vertex without the administration of intravenous contrast.  Coronal and   sagittal reformations were made.    COMPARISON: None    FINDINGS:    There is periventricular and scattered cortical white matter hypodensity.   There is asymmetric hyperdensity involving the right basal ganglia. There   is a chronic right occipital infarct as well as a right cerebellar   infarct.    There is a rounded hyperdensity noted within the left subinsular region   which measures approximately 1.1 cm, series 2 image 8.    The ventricles and sulci are unremarkable in appearance.    There are bilateral basal ganglia calcifications. There is no mass effect   or midline shift. No abnormal extra-axial fluid collections are present.    The calvarium is intact. The visualized intraorbital compartments,   paranasal sinuses and mastoid complexes appear free of acute disease.    IMPRESSION:  Age indeterminate right basal ganglia infarct.    Rounded hyperdensity noted as noted within the left subinsular region   potentially reflecting calcification though component of acute hemorrhage   cannot entirely be excluded. Correlation with prior examinations is   advised. Further evaluation with brain MRI can be obtained.    Moderate chronic microvascular type changes as well as chronic infarcts   involving the right occipital lobe and right cerebellar hemisphere.    These findings were discussed with Dr. LUIS ALCALA [id number] at   1/19/2023 11:15 AM by Dr. Teran with read back confirmation.    --- End of Report ---            ROBYN TERAN MD; Attending Radiologist  This document has been electronically signed. Jan 19 2023 11:15AM    < end of copied text >

## 2023-01-19 NOTE — H&P ADULT - HISTORY OF PRESENT ILLNESS
Mr. Estevez 77 year old cantaneous speaking males with PMHx of CHF unknown ef, hypertension, hyperlipidemia, CAD, CKD3, CVA 8 years ago presenting for evaluation of lightheadedness, shortness of breath and vomiting for past 2 days. Daughter reports patient was seeing a cardiologist oupatient about a month ago for worsening LE bilateral edema and was diagnosed with new onset chf. Over the past week, he began feeling nausea with associated intermittent vomiting and decrease PO intake. Outpatient he was diagnosed with COVID and Rx for paxlovid, first dose was yesterday. Today he had 3 episodes of nonbloody nonbilious vomiting.  No fevers, chills, chest pain, abdominal pain, seizures, mental status changes.     In the ED, vitals BP: 124/60 HR: 52 RR: 18 Spo2 98 % on RA, afebrile. Labs significant for Na 119 (no baseline labs), creat 2.0, BNP 15k. COVID PCR positive. CXR: Left basilar consolidation. Right-sided fibrotic changes. CT chest/abd/pelvis: Bilateral peribronchial thickening. Left basilar consolidation with trace pleural effusion.Predominantly right upper lobe fibrotic changes with architectural distortion.Bilateral calcified pleural plaques.Circumferential distal esophageal wall thickening. Lobulated liver contour suggesting early cirrhotic changes.Trace abdominopelvic ascites, nonspecific. CT head: Age indeterminate right basal ganglia infarct. Rounded hyperdensity noted as noted within the left subinsular region potentially reflecting calcification though component of acute hemorrhage cannot entirely be excluded. Moderate chronic microvascular type changes as well as chronic infarcts involving the right occipital lobe and right cerebellar hemisphere. Patient given 1 L NS and admitted to ICU For further management.     Patient evaluated by NCC: hyperdense regions appear to be less likely hemorrhage and more likely calcification. The patient does NOT have any neurological deficits at the present moment on physical exam.  Recommend getting a second CT head non con 6hr post original scan on presentation and we will follow up imaging to assess for further recs.

## 2023-01-19 NOTE — ED PROVIDER NOTE - PHYSICAL EXAMINATION
VITAL SIGNS: I have reviewed nursing notes and confirm.  CONSTITUTIONAL: well-appearing, non-toxic, NAD  SKIN: Warm dry, normal skin turgor  HEAD: NCAT  EYES: EOMI, PERRLA, no scleral icterus  ENT: Moist mucous membranes, normal pharynx with no erythema or exudates  CARD: Irregularly irregular no murmurs, rubs or gallops  RESP: Crackles in bilateral lung fields  ABD: soft, + BS, non-tender, non-distended, no rebound or guarding.   EXT: (+) pitting edema in bilateral lower extremities  PSYCH: Cooperative, appropriate. VITAL SIGNS: I have reviewed nursing notes and confirm.  CONSTITUTIONAL: well-appearing, non-toxic, NAD  SKIN: Warm dry, normal skin turgor  HEAD: NCAT  EYES: EOMI, PERRLA, no scleral icterus  ENT: Moist mucous membranes, normal pharynx with no erythema or exudates  CARD: Tachycardic but regular rhythm no murmurs, rubs or gallops  RESP: Crackles in bilateral lung fields  ABD: soft, + BS, non-tender, non-distended, no rebound or guarding.   EXT: (+) pitting edema in bilateral lower extremities  PSYCH: Cooperative, appropriate.

## 2023-01-19 NOTE — H&P ADULT - ASSESSMENT
IMPRESSION:   Hyponatremia most likely hypovolemic undersetting of COVID and poor po intake/emesis   Mild covid pneumonia   Pancytopenia  KOBE vs CKD   Thalamic hyperdensites r/o intracranial hemorrhage?  HO CAD  HO CHF  HO CAD    PLAN:    CNS: Avoid sedatives, repeat CTH in 6 hours as per NCC, NCC follow up    HEENT: Oral care    PULMONARY:  HOB @ 45 degrees.  Aspiration precautions. Goal Pox 92-96%    CARDIOVASCULAR: CW NS, Keep Map >65    GI: GI prophylaxis.  Feeding.  Bowel regimen     RENAL:  Follow up lytes.  Correct as needed. Monitor Sodium, if no improvement in 4 hours give 100CC 3% saline. Avoid overcorrection   c/w NS @ 75/hr; f/u bmp @ 8pm, avoid nephrotoxic agents.    INFECTIOUS DISEASE: Follow up cultures    HEMATOLOGICAL:  DVT prophylaxis.    ENDOCRINE:  Follow up FS.  Insulin protocol if needed.    MUSCULOSKELETAL: bedrest           IMPRESSION:   Hyponatremia most likely hypovolemic undersetting of COVID and poor po intake/emesis   Mild covid pneumonia   Pancytopenia  KOBE vs CKD   Thalamic hyperdensites r/o intracranial hemorrhage?  HO CAD  HO CHF  HO CAD    PLAN:    CNS: Avoid sedatives, repeat CTH in 6 hours as per NCC, NCC follow up    HEENT: Oral care    PULMONARY:  HOB @ 45 degrees.  Aspiration precautions. Goal Pox 92-96%    CARDIOVASCULAR: CW NS 75cc/hr, Keep Map >65. ECHO.     GI: GI prophylaxis.  Feeding.  Bowel regimen     RENAL:  Follow up lytes.  Correct as needed. Monitor Sodium, if no improvement in 4 hours give 100CC 3% saline. Avoid overcorrection   c/w NS @ 75/hr; f/u bmp @ 8pm, avoid nephrotoxic agents.    INFECTIOUS DISEASE: Follow up cultures    HEMATOLOGICAL:  DVT prophylaxis. ID eval     ENDOCRINE:  Follow up FS.  Insulin protocol if needed.    MUSCULOSKELETAL: bedrest        IMPRESSION:   Hyponatremia most likely hypovolemic undersetting of COVID and poor po intake/emesis   Mild covid pneumonia   Pancytopenia  KOBE vs CKD   Thalamic hyperdensites r/o intracranial hemorrhage?  HO CAD  HO CHF  HO CAD    PLAN:    CNS: Avoid sedatives, repeat CTH in 6 hours as per NCC, NCC follow up    HEENT: Oral care    PULMONARY:  HOB @ 45 degrees.  Aspiration precautions. Goal Pox 92-96%    CARDIOVASCULAR: CW NS 75cc/hr, Keep Map >65. ECHO.     GI: GI prophylaxis.  Feeding.  Bowel regimen     RENAL:  Follow up lytes.  Correct as needed. Monitor Sodium, if no improvement in 4 hours give 100CC 3% saline. Avoid overcorrection   c/w NS @ 75/hr; f/u bmp @ 8pm, avoid nephrotoxic agents.    INFECTIOUS DISEASE: Follow up cultures. ID eval     HEMATOLOGICAL: HOLD antiplatelet and anticoagulation until cleared by neurocrit     ENDOCRINE:  Follow up FS.  Insulin protocol if needed.    MUSCULOSKELETAL: bedrest

## 2023-01-19 NOTE — CONSULT NOTE ADULT - ASSESSMENT
IMPRESSION:  Hyponatremia  Covid +  Pancytopenia  KOBE vs CKD   ?Brain Bleed  HO CAD  HO CHF  HO CAD    PLAN:    CNS: Avoid sedatives, repeat CTH in 6 hours as per NCC, NCC follow up    HEENT: Oral care    PULMONARY:  HOB @ 45 degrees.  Aspiration precautions. Goal Pox 92-96%    CARDIOVASCULAR: CW NS, Keep Map >65    GI: GI prophylaxis.  Feeding.  Bowel regimen     RENAL:  Follow up lytes.  Correct as needed. Monitor Sodium, if no improvement in 4 hours give 100CC 3% saline. Avoid overcorrection     INFECTIOUS DISEASE: Follow up cultures    HEMATOLOGICAL:  DVT prophylaxis.    ENDOCRINE:  Follow up FS.  Insulin protocol if needed.    MUSCULOSKELETAL: bedrest    The patient is critically ill with a high probability of imminent or life threatening deterioration.       IMPRESSION:  Hyponatremia  Covid +  Pancytopenia  KOBE vs CKD   ?Brain Bleed  HO CAD  HO CHF  HO CAD    PLAN:    CNS: Avoid sedatives, FU repeat CTH as per NCC, NCC follow up    HEENT: Oral care    PULMONARY:  HOB @ 45 degrees.  Aspiration precautions. Goal Pox 92-96%    CARDIOVASCULAR: CW NS, Keep Map >65. ECHO    GI: GI prophylaxis.  Feeding.  Bowel regimen     RENAL:  Follow up lytes.  Correct as needed. Monitor Sodium, if no improvement in 4 hours give 100CC 3% saline. Avoid overcorrection. Check urine sodium, urine osm, serum osm    INFECTIOUS DISEASE: Follow up cultures. ID eval    HEMATOLOGICAL:  DVT prophylaxis.    ENDOCRINE:  Follow up FS.  Insulin protocol if needed.    MUSCULOSKELETAL: bedrest    MICU for now, if sodium improving and CTH stable with improving mental status downgrade to SDU       IMPRESSION:  Hyponatremia  Covid +  Pancytopenia  KOBE vs CKD   ?Brain Bleed  HO CAD  HO CHF  HO CAD    PLAN:    CNS: Avoid sedatives, FU repeat CTH as per NCC, NCC follow up.    HEENT: Oral care    PULMONARY:  HOB @ 45 degrees.  Aspiration precautions. Goal Pox 92-96%. On room air. Chronic changes on CT/CXR.    CARDIOVASCULAR: CW NS, Keep Map >65. ECHO    GI: GI prophylaxis.  Feeding; speech and swallow.  Bowel regimen     RENAL:  NS at 75cc/hr. Follow up repeat lytes.  Correct as needed. Monitor Sodium. Avoid overcorrection. Check urine sodium, urine osm, serum osm    INFECTIOUS DISEASE: Follow up cultures. ID eval for COVID-19.     HEMATOLOGICAL:  DVT prophylaxis.    ENDOCRINE:  Follow up FS.  Insulin protocol if needed.    MUSCULOSKELETAL: bedrest    MICU for now, if sodium improving and CTH stable with improving mental status downgrade to SDU

## 2023-01-19 NOTE — CONSULT NOTE ADULT - ASSESSMENT
Assessment: Patient presenting with SOB in the setting of COPD compounded with covid infection and CHF with elevated biomarkers. Hyponatremia in the setting of heart failure with GI losses over the past week. New finding of bilateral radio-opaque calcific appearing areas within the thalami and chronic subdural appearance without acute component or parenchymal disruption.     Critical Care: Airway is patent, breathing is intact on room air and non labored, hemodynamics are stable with mild tachycardia and normotension     Consulted for Thalamic hyperdensities   - At this time, hyperdense regions appear to be less likely hemorrhage and more likely calcification. The patient does NOT have any neurological deficits at the present moment on physical exam.   - Recommend getting a second CT head non con 6hr post original scan on presentation and we will follow up imaging to assess for further recs   - Systolic blood pressure goals recommended 110-160 max and hold AC / AP until follow up scan     Multiple medical comorbidities  - Recommend admission to medicine for further workup and treatment   - would be cautious with IVF in the setting of CHF and SOB     - Case discussed with Dr. Ponce and ED attending on record

## 2023-01-19 NOTE — ED PROVIDER NOTE - CLINICAL SUMMARY MEDICAL DECISION MAKING FREE TEXT BOX
patient evaluated for lightheadedness found to have hyponatermia and abn ct head findings. found to be covid positive, patient admitted for further management

## 2023-01-19 NOTE — ED PROVIDER NOTE - ATTENDING CONTRIBUTION TO CARE
# [id number] and [id number]  77 yr old m w/ a pmh significant for htn, hld, cad, ckd, cva who presents with lightheadedness, SOB and vomiting. Pt was ddx with covid 2 days ago and started on paxlovid. Pt states that since then he has been feeling weak and lightheaded. Pt denies any other medical complaints.     VITAL SIGNS: I have reviewed nursing notes and confirm.  CONSTITUTIONAL: non-toxic, well appearing  SKIN: no rash, no petechiae.  EYES:, EOMI, pink conjunctiva, anicteric  ENT: tongue midline, no exudates, MMM  NECK: Supple; no meningismus, no JVD  CARD: RRR, no murmurs, equal radial pulses bilaterally 2+  RESP: CTAB, no respiratory distress  ABD: Soft, non-tender, non-distended, no peritoneal signs, no HSM, no CVA tenderness  EXT: Normal ROM x4. No edema. No calves tenderness  NEURO: Alert, oriented x3. CN2-12 intact, equal strength bilaterally    a/p  77 yr old m that presents with vomiting, lightheadness. labs, ekg, imaging, consider admission.

## 2023-01-20 LAB
ALBUMIN SERPL ELPH-MCNC: 3.1 G/DL — LOW (ref 3.5–5.2)
ALP SERPL-CCNC: 70 U/L — SIGNIFICANT CHANGE UP (ref 30–115)
ALT FLD-CCNC: 11 U/L — SIGNIFICANT CHANGE UP (ref 0–41)
ANION GAP SERPL CALC-SCNC: 11 MMOL/L — SIGNIFICANT CHANGE UP (ref 7–14)
ANION GAP SERPL CALC-SCNC: 8 MMOL/L — SIGNIFICANT CHANGE UP (ref 7–14)
ANION GAP SERPL CALC-SCNC: 9 MMOL/L — SIGNIFICANT CHANGE UP (ref 7–14)
ANISOCYTOSIS BLD QL: SLIGHT — SIGNIFICANT CHANGE UP
AST SERPL-CCNC: 30 U/L — SIGNIFICANT CHANGE UP (ref 0–41)
BASOPHILS # BLD AUTO: 0 K/UL — SIGNIFICANT CHANGE UP (ref 0–0.2)
BASOPHILS NFR BLD AUTO: 0 % — SIGNIFICANT CHANGE UP (ref 0–1)
BILIRUB SERPL-MCNC: 0.4 MG/DL — SIGNIFICANT CHANGE UP (ref 0.2–1.2)
BUN SERPL-MCNC: 18 MG/DL — SIGNIFICANT CHANGE UP (ref 10–20)
BUN SERPL-MCNC: 19 MG/DL — SIGNIFICANT CHANGE UP (ref 10–20)
BUN SERPL-MCNC: 22 MG/DL — HIGH (ref 10–20)
CALCIUM SERPL-MCNC: 8.2 MG/DL — LOW (ref 8.4–10.5)
CALCIUM SERPL-MCNC: 8.3 MG/DL — LOW (ref 8.4–10.5)
CALCIUM SERPL-MCNC: 8.3 MG/DL — LOW (ref 8.4–10.5)
CHLORIDE SERPL-SCNC: 96 MMOL/L — LOW (ref 98–110)
CHLORIDE SERPL-SCNC: 97 MMOL/L — LOW (ref 98–110)
CHLORIDE SERPL-SCNC: 98 MMOL/L — SIGNIFICANT CHANGE UP (ref 98–110)
CO2 SERPL-SCNC: 20 MMOL/L — SIGNIFICANT CHANGE UP (ref 17–32)
CO2 SERPL-SCNC: 21 MMOL/L — SIGNIFICANT CHANGE UP (ref 17–32)
CO2 SERPL-SCNC: 21 MMOL/L — SIGNIFICANT CHANGE UP (ref 17–32)
CREAT SERPL-MCNC: 1.9 MG/DL — HIGH (ref 0.7–1.5)
CREAT SERPL-MCNC: 2 MG/DL — HIGH (ref 0.7–1.5)
CREAT SERPL-MCNC: 2 MG/DL — HIGH (ref 0.7–1.5)
EGFR: 34 ML/MIN/1.73M2 — LOW
EGFR: 34 ML/MIN/1.73M2 — LOW
EGFR: 36 ML/MIN/1.73M2 — LOW
EOSINOPHIL # BLD AUTO: 0 K/UL — SIGNIFICANT CHANGE UP (ref 0–0.7)
EOSINOPHIL NFR BLD AUTO: 0 % — SIGNIFICANT CHANGE UP (ref 0–8)
GIANT PLATELETS BLD QL SMEAR: PRESENT — SIGNIFICANT CHANGE UP
GLUCOSE BLDC GLUCOMTR-MCNC: 110 MG/DL — HIGH (ref 70–99)
GLUCOSE BLDC GLUCOMTR-MCNC: 116 MG/DL — HIGH (ref 70–99)
GLUCOSE BLDC GLUCOMTR-MCNC: 61 MG/DL — LOW (ref 70–99)
GLUCOSE BLDC GLUCOMTR-MCNC: 97 MG/DL — SIGNIFICANT CHANGE UP (ref 70–99)
GLUCOSE BLDC GLUCOMTR-MCNC: 98 MG/DL — SIGNIFICANT CHANGE UP (ref 70–99)
GLUCOSE SERPL-MCNC: 55 MG/DL — LOW (ref 70–99)
GLUCOSE SERPL-MCNC: 72 MG/DL — SIGNIFICANT CHANGE UP (ref 70–99)
GLUCOSE SERPL-MCNC: 99 MG/DL — SIGNIFICANT CHANGE UP (ref 70–99)
HCT VFR BLD CALC: 24.4 % — LOW (ref 42–52)
HCV AB S/CO SERPL IA: 0.04 COI — SIGNIFICANT CHANGE UP
HCV AB SERPL-IMP: SIGNIFICANT CHANGE UP
HGB BLD-MCNC: 8.6 G/DL — LOW (ref 14–18)
LYMPHOCYTES # BLD AUTO: 0.14 K/UL — LOW (ref 1.2–3.4)
LYMPHOCYTES # BLD AUTO: 4.3 % — LOW (ref 20.5–51.1)
MACROCYTES BLD QL: SLIGHT — SIGNIFICANT CHANGE UP
MAGNESIUM SERPL-MCNC: 1.7 MG/DL — LOW (ref 1.8–2.4)
MANUAL SMEAR VERIFICATION: SIGNIFICANT CHANGE UP
MCHC RBC-ENTMCNC: 31.9 PG — HIGH (ref 27–31)
MCHC RBC-ENTMCNC: 35.2 G/DL — SIGNIFICANT CHANGE UP (ref 32–37)
MCV RBC AUTO: 90.4 FL — SIGNIFICANT CHANGE UP (ref 80–94)
MONOCYTES # BLD AUTO: 0.47 K/UL — SIGNIFICANT CHANGE UP (ref 0.1–0.6)
MONOCYTES NFR BLD AUTO: 14.8 % — HIGH (ref 1.7–9.3)
NEUTROPHILS # BLD AUTO: 2.56 K/UL — SIGNIFICANT CHANGE UP (ref 1.4–6.5)
NEUTROPHILS NFR BLD AUTO: 80.9 % — HIGH (ref 42.2–75.2)
OSMOLALITY SERPL: 265 MOS/KG — LOW (ref 280–301)
OSMOLALITY SERPL: 266 MOS/KG — LOW (ref 280–301)
OVALOCYTES BLD QL SMEAR: SLIGHT — SIGNIFICANT CHANGE UP
PLAT MORPH BLD: NORMAL — SIGNIFICANT CHANGE UP
PLATELET # BLD AUTO: 93 K/UL — LOW (ref 130–400)
POIKILOCYTOSIS BLD QL AUTO: SLIGHT — SIGNIFICANT CHANGE UP
POTASSIUM SERPL-MCNC: 3.9 MMOL/L — SIGNIFICANT CHANGE UP (ref 3.5–5)
POTASSIUM SERPL-MCNC: 3.9 MMOL/L — SIGNIFICANT CHANGE UP (ref 3.5–5)
POTASSIUM SERPL-MCNC: 4 MMOL/L — SIGNIFICANT CHANGE UP (ref 3.5–5)
POTASSIUM SERPL-SCNC: 3.9 MMOL/L — SIGNIFICANT CHANGE UP (ref 3.5–5)
POTASSIUM SERPL-SCNC: 3.9 MMOL/L — SIGNIFICANT CHANGE UP (ref 3.5–5)
POTASSIUM SERPL-SCNC: 4 MMOL/L — SIGNIFICANT CHANGE UP (ref 3.5–5)
PROCALCITONIN SERPL-MCNC: 0.07 NG/ML — SIGNIFICANT CHANGE UP (ref 0.02–0.1)
PROT SERPL-MCNC: 5.3 G/DL — LOW (ref 6–8)
RBC # BLD: 2.7 M/UL — LOW (ref 4.7–6.1)
RBC # FLD: 13.2 % — SIGNIFICANT CHANGE UP (ref 11.5–14.5)
RBC BLD AUTO: ABNORMAL
SMUDGE CELLS # BLD: PRESENT — SIGNIFICANT CHANGE UP
SODIUM SERPL-SCNC: 125 MMOL/L — LOW (ref 135–146)
SODIUM SERPL-SCNC: 127 MMOL/L — LOW (ref 135–146)
SODIUM SERPL-SCNC: 129 MMOL/L — LOW (ref 135–146)
WBC # BLD: 3.16 K/UL — LOW (ref 4.8–10.8)
WBC # FLD AUTO: 3.16 K/UL — LOW (ref 4.8–10.8)

## 2023-01-20 PROCEDURE — 71045 X-RAY EXAM CHEST 1 VIEW: CPT | Mod: 26

## 2023-01-20 PROCEDURE — 99233 SBSQ HOSP IP/OBS HIGH 50: CPT

## 2023-01-20 PROCEDURE — 99222 1ST HOSP IP/OBS MODERATE 55: CPT

## 2023-01-20 RX ORDER — MAGNESIUM OXIDE 400 MG ORAL TABLET 241.3 MG
400 TABLET ORAL
Refills: 0 | Status: DISCONTINUED | OUTPATIENT
Start: 2023-01-20 | End: 2023-01-26

## 2023-01-20 RX ORDER — ACETAMINOPHEN 500 MG
650 TABLET ORAL EVERY 6 HOURS
Refills: 0 | Status: DISCONTINUED | OUTPATIENT
Start: 2023-01-20 | End: 2023-01-26

## 2023-01-20 RX ORDER — REMDESIVIR 5 MG/ML
200 INJECTION INTRAVENOUS EVERY 24 HOURS
Refills: 0 | Status: COMPLETED | OUTPATIENT
Start: 2023-01-20 | End: 2023-01-20

## 2023-01-20 RX ORDER — GUAIFENESIN/DEXTROMETHORPHAN 600MG-30MG
10 TABLET, EXTENDED RELEASE 12 HR ORAL EVERY 8 HOURS
Refills: 0 | Status: DISCONTINUED | OUTPATIENT
Start: 2023-01-20 | End: 2023-01-26

## 2023-01-20 RX ORDER — REMDESIVIR 5 MG/ML
100 INJECTION INTRAVENOUS EVERY 24 HOURS
Refills: 0 | Status: COMPLETED | OUTPATIENT
Start: 2023-01-21 | End: 2023-01-22

## 2023-01-20 RX ORDER — REMDESIVIR 5 MG/ML
INJECTION INTRAVENOUS
Refills: 0 | Status: COMPLETED | OUTPATIENT
Start: 2023-01-20 | End: 2023-01-22

## 2023-01-20 RX ADMIN — MAGNESIUM OXIDE 400 MG ORAL TABLET 400 MILLIGRAM(S): 241.3 TABLET ORAL at 17:41

## 2023-01-20 RX ADMIN — Medication 10 MILLILITER(S): at 23:49

## 2023-01-20 RX ADMIN — PANTOPRAZOLE SODIUM 40 MILLIGRAM(S): 20 TABLET, DELAYED RELEASE ORAL at 08:50

## 2023-01-20 RX ADMIN — REMDESIVIR 200 MILLIGRAM(S): 5 INJECTION INTRAVENOUS at 12:57

## 2023-01-20 RX ADMIN — Medication 650 MILLIGRAM(S): at 15:45

## 2023-01-20 NOTE — CHART NOTE - NSCHARTNOTEFT_GEN_A_CORE
Case discussed and imaging reviewed by attending. Pt remains neurologically intact     - No neurosurgical intervention indicated at this time   - Agree w CTA head and neck to r/o vascular malformation / aneurysm   - Neuroendovascular Cx if CTA necessitates   - Ok for DVT ppx   - No need for further imaging from neurosurgical standpoint  - Continue management per primary     < from: CT Head No Cont (01.19.23 @ 15:58) >    IMPRESSION:  No significant change in comparison to recent same day head CT    Redemonstration of a rounded hyperdensity within the left subinsular   region potentially reflecting calcification, acute hemorrhage or possibly   an aneurysm. A CTA in addition to MRI of the brain is recommended for   further evaluation.    Age indeterminate right basal ganglia infarct right occipital and right   cerebellar hemisphere.    --- End of Report ---            ROBYN MCINTOSH MD; Attending Radiologist  This document has been electronically signed. Jan 19 2023  4:07PM    < end of copied text >

## 2023-01-20 NOTE — CONSULT NOTE ADULT - ASSESSMENT
77 year old cantaneous speaking males with PMHx of CHF unknown ef, hypertension, hyperlipidemia, CAD, CKD3, CVA 8 years ago presenting for evaluation of lightheadedness, shortness of breath and vomiting for past 2 days. Daughter reports patient was seeing a cardiologist oupatient about a month ago for worsening LE bilateral edema and was diagnosed with new onset chf. Over the past week, he began feeling nausea with associated intermittent vomiting and decrease PO intake. Outpatient he was diagnosed with COVID and Rx for paxlovid, first dose was yesterday. Today he had 3 episodes of nonbloody nonbilious vomiting.  No fevers, chills, chest pain, abdominal pain, seizures, mental status changes.     CT chest/abd/pelvis: Bilateral peribronchial thickening. Left basilar consolidation with trace pleural effusion.Predominantly right upper lobe fibrotic changes with architectural distortion.Bilateral calcified pleural plaques.Circumferential distal esophageal wall thickening. Lobulated liver contour suggesting early cirrhotic changes.Trace abdominopelvic ascites, nonspecific.   CT head: Age indeterminate right basal ganglia infarct.     IMPRESSION;   COVID with Moderate illness   On RA: comfortable  CXR no GGO  CT noted. Pleural plaques. Chronic changes  Clinicaly no bacterial PNA  Pt is in the early viral replicative phase based on the timeline/onset of symptoms.  Ddimers 345  Vaccination status unclear  CHF : BNP [id number]    RECOMMENDATIONS;   mg iv on Day 1, then 100 mg iv D2 and D3. If discharged earlier could change to po Paxlovid q12h for 5 days . Check with pharmacy for drug interactions.   No steroids  No ABx  Please do not hesitate to recall ID if any questions arise either through [employer] or through [employer]

## 2023-01-20 NOTE — PROGRESS NOTE ADULT - ASSESSMENT
IMPRESSION:  Hyponatremia improving  Covid 19 infection   Pancytopenia  KOBE vs CKD   Doubt Brain Bleed  HO CAD  HO CHF  HO CAD    PLAN:    CNS: Avoid sedatives, Repeat CTH unchanged.  NCC follow up.  SP neuroSX eval     HEENT: Oral care    PULMONARY:  HOB @ 45 degrees.  Aspiration precautions. Goal Pox 92-96%.  Incentive spirometry     CARDIOVASCULAR: Keep Map >65. FU ECHO.  BNP Noted.  DC IVF.  Avoid overload     GI: GI prophylaxis.  Feeding; speech and swallow.  Bowel regimen     RENAL:  Follow up lytes.  Correct as needed. Monitor Sodium. Avoid overcorrection. Check urine sodium, urine osm, serum osm    INFECTIOUS DISEASE: Follow up cultures. Procal.  RDSV ID eval     HEMATOLOGICAL:  DVT prophylaxis.  Dimer noted.      ENDOCRINE:  Follow up FS.  Insulin protocol if needed.    MUSCULOSKELETAL: bedrest    SDU

## 2023-01-20 NOTE — CONSULT NOTE ADULT - CONSULT REASON
Hyponatremia  possible brain bleed
Possible ICH
Basal Ganglia Hemorrhage versus Calcification
COVID-19
Possible ICH

## 2023-01-20 NOTE — CHART NOTE - NSCHARTNOTEFT_GEN_A_CORE
ICU Transfer Note    Transfer to: (  ) Medicine    (  ) Telemetry    (  ) RCU                               (  ) Palliative    (  ) Stroke Unit    (  ) MICU    (X) SDU    HPI / ICU COURSE:      Mr. Estevez 77 year old cantaneous speaking males with PMHx of CHF unknown ef, hypertension, hyperlipidemia, CAD, CKD3, CVA 8 years ago presenting for evaluation of lightheadedness, shortness of breath and vomiting for past 2 days. Daughter reports patient was seeing a cardiologist oupatient about a month ago for worsening LE bilateral edema and was diagnosed with new onset chf. Over the past week, he began feeling nausea with associated intermittent vomiting and decrease PO intake. Outpatient he was diagnosed with COVID and Rx for paxlovid, first dose was yesterday. Today he had 3 episodes of nonbloody nonbilious vomiting.  No fevers, chills, chest pain, abdominal pain, seizures, mental status changes.     In the ED, vitals BP: 124/60 HR: 52 RR: 18 Spo2 98 % on RA, afebrile. Labs significant for Na 119 (no baseline labs), creat 2.0, BNP 15k. COVID PCR positive. CXR: Left basilar consolidation. Right-sided fibrotic changes. CT chest/abd/pelvis: Bilateral peribronchial thickening. Left basilar consolidation with trace pleural effusion.Predominantly right upper lobe fibrotic changes with architectural distortion.Bilateral calcified pleural plaques.Circumferential distal esophageal wall thickening. Lobulated liver contour suggesting early cirrhotic changes.Trace abdominopelvic ascites, nonspecific. CT head: Age indeterminate right basal ganglia infarct. Rounded hyperdensity noted as noted within the left subinsular region potentially reflecting calcification though component of acute hemorrhage cannot entirely be excluded. Moderate chronic microvascular type changes as well as chronic infarcts involving the right occipital lobe and right cerebellar hemisphere. Patient given 1 L NS and admitted to ICU For further management.     Patient evaluated by NCC: hyperdense regions appear to be less likely hemorrhage and more likely calcification. The patient does NOT have any neurological deficits at the present moment on physical exam.  Recommend getting a second CT head non con 6hr post original scan on presentation and we will follow up imaging to assess for further recs.     Patient admitted to ICU for 1 day, in which sodium improved to 125 and repeat CT head was stable < from: CT Head No Cont (01.19.23 @ 15:58) >    No significant change in comparison to recent same day head CT: Redemonstration of a rounded hyperdensity within the left subinsular region potentially reflecting calcification, acute hemorrhage or possibly   an aneurysm. Age indeterminate right basal ganglia infarct right occipital and right cerebellar hemisphere. Patient evaluated by neurology, neurocrtical care and neurosurgery.   < end of copied text >        Vital Signs Last 24 Hrs  T(C): 37.5 (20 Jan 2023 04:58), Max: 38.1 (19 Jan 2023 17:00)  T(F): 99.5 (20 Jan 2023 04:58), Max: 100.5 (19 Jan 2023 17:00)  HR: 82 (20 Jan 2023 04:58) (52 - 88)  BP: 161/68 (20 Jan 2023 04:58) (124/60 - 163/77)  BP(mean): 101 (20 Jan 2023 04:58) (98 - 105)  RR: 17 (20 Jan 2023 04:58) (17 - 18)  SpO2: 100% (20 Jan 2023 04:58) (97% - 100%)    Parameters below as of 20 Jan 2023 04:58  Patient On (Oxygen Delivery Method): room air        I&O's Summary      Physical Exam:       LABS:   CARDIAC MARKERS ( 19 Jan 2023 11:00 )  x     / <0.01 ng/mL / x     / x     / x                                  9.4    3.66  )-----------( 97       ( 19 Jan 2023 11:00 )             26.7       01-20    125<L>  |  96<L>  |  18  ----------------------------<  72  4.0   |  21  |  2.0<H>    Ca    8.3<L>      20 Jan 2023 00:33    TPro  6.0  /  Alb  3.5  /  TBili  0.4  /  DBili  x   /  AST  26  /  ALT  8   /  AlkPhos  78  01-19      PT/INR - ( 19 Jan 2023 12:30 )   PT: 11.60 sec;   INR: 1.02 ratio         PTT - ( 19 Jan 2023 12:30 )  PTT:35.8 sec          ECG:    Telemetry:    Imaging:      ASSESSMENT & PLAN:           FOR FOLLOW UP:  [ ] neurology, neurosurgery and neurocrtical care recommendationns  [ ] serial bmp   [ ] ICU Transfer Note    Transfer to: (  ) Medicine    (  ) Telemetry    (  ) RCU                               (  ) Palliative    (  ) Stroke Unit    (  ) MICU    (X) SDU    HPI / ICU COURSE:      Mr. Estevez 77 year old cantonese speaking males with PMHx of CHF unknown ef, hypertension, hyperlipidemia, CAD, CKD3, CVA 8 years ago presenting for evaluation of lightheadedness, shortness of breath and vomiting for past 2 days. Daughter reports patient was seeing a cardiologist oupatient about a month ago for worsening LE bilateral edema and was diagnosed with new onset chf. Over the past week, he began feeling nausea with associated intermittent vomiting and decrease PO intake. Outpatient he was diagnosed with COVID and Rx for paxlovid, first dose was yesterday. Today he had 3 episodes of nonbloody nonbilious vomiting.  No fevers, chills, chest pain, abdominal pain, seizures, mental status changes.     In the ED, vitals BP: 124/60 HR: 52 RR: 18 Spo2 98 % on RA, afebrile. Labs significant for Na 119 (no baseline labs), creat 2.0, BNP 15k. COVID PCR positive. CXR: Left basilar consolidation. Right-sided fibrotic changes. CT chest/abd/pelvis: Bilateral peribronchial thickening. Left basilar consolidation with trace pleural effusion.Predominantly right upper lobe fibrotic changes with architectural distortion. Bilateral calcified pleural plaques.Circumferential distal esophageal wall thickening. Lobulated liver contour suggesting early cirrhotic changes.Trace abdominopelvic ascites, nonspecific. CT head: Age indeterminate right basal ganglia infarct. Rounded hyperdensity noted as noted within the left subinsular region potentially reflecting calcification though component of acute hemorrhage cannot entirely be excluded. Moderate chronic microvascular type changes as well as chronic infarcts involving the right occipital lobe and right cerebellar hemisphere. Patient given 1 L NS and admitted to ICU For further management.     Patient evaluated by NCC: hyperdense regions appear to be less likely hemorrhage and more likely calcification. The patient does NOT have any neurological deficits at the present moment on physical exam.  Recommend getting a second CT head non con 6hr post original scan on presentation and we will follow up imaging to assess for further recs.     Patient admitted to ICU for 1 day, in which sodium improved to 125 and repeat CT head was stable < from: CT Head No Cont (01.19.23 @ 15:58) >    No significant change in comparison to recent same day head CT: Redemonstration of a rounded hyperdensity within the left subinsular region potentially reflecting calcification, acute hemorrhage or possibly   an aneurysm. Age indeterminate right basal ganglia infarct right occipital and right cerebellar hemisphere. Patient evaluated by neurology, neurocritical care and neurosurgery.        HO CAD    Mr. Estevez 77 year old cantonese speaking males with PMHx of CHF unknown ef, hypertension, hyperlipidemia, CAD, CKD3, CVA 8 years ago presenting for evaluation of lightheadedness, shortness of breath and vomiting for past 2 days.      #Hyponatremia most likely hypovolemic in setting of COVID and poor po intake/emesis   - s/p NS hydration with appropriate correction  - monitor of NS  - f/u urine lytes  - trend BMP    #Thalamic hyperdensites r/o intracranial hemorrhage?  - Repeat CT head 6 hours laters: Redemonstration of a rounded hyperdensity within the left subinsular region potentially reflecting calcification, acute hemorrhage or possibly   an aneurysm. Age indeterminate right basal ganglia infarct right occipital and right cerebellar hemisphere.  - Neurology requesting CTA head and neck (ordered) f/u recommendations  - f/u neurosurgery and neurocrit recommendations   HOLD antiplatelet and anticoagulation until cleared by neurocrit       #covid infection  - not hypoxic   - started on remdesvir -> d/c on plaxlovid  - no need for abx and steroids  - ID recommendations appreciated     #pancytopenia 2/2 to multifactorial   - trend cbc   - keep type and screen       #emma vs CKD  - trend urine lytes  - avoid nephrotoxic agents   - s/p ns iv hydration   - CT abd/pelvis: no hydro    #CAD/CHF  - f/u ECHO  - avoid volume overload  - restart home meds once BP stablizes as per neuro       FOR FOLLOW UP:  [ ] neurology, neurosurgery and neurocritical care recommendations  [ ] serial bmp   [ ] restart home meds             Vital Signs Last 24 Hrs  T(C): 37.5 (20 Jan 2023 04:58), Max: 38.1 (19 Jan 2023 17:00)  T(F): 99.5 (20 Jan 2023 04:58), Max: 100.5 (19 Jan 2023 17:00)  HR: 82 (20 Jan 2023 04:58) (52 - 88)  BP: 161/68 (20 Jan 2023 04:58) (124/60 - 163/77)  BP(mean): 101 (20 Jan 2023 04:58) (98 - 105)  RR: 17 (20 Jan 2023 04:58) (17 - 18)  SpO2: 100% (20 Jan 2023 04:58) (97% - 100%)    Parameters below as of 20 Jan 2023 04:58  Patient On (Oxygen Delivery Method): room air      LABS:   CARDIAC MARKERS ( 19 Jan 2023 11:00 )  x     / <0.01 ng/mL / x     / x     / x                                  9.4    3.66  )-----------( 97       ( 19 Jan 2023 11:00 )             26.7       01-20    125<L>  |  96<L>  |  18  ----------------------------<  72  4.0   |  21  |  2.0<H>    Ca    8.3<L>      20 Jan 2023 00:33    TPro  6.0  /  Alb  3.5  /  TBili  0.4  /  DBili  x   /  AST  26  /  ALT  8   /  AlkPhos  78  01-19      PT/INR - ( 19 Jan 2023 12:30 )   PT: 11.60 sec;   INR: 1.02 ratio         PTT - ( 19 Jan 2023 12:30 )  PTT:35.8 sec

## 2023-01-20 NOTE — CONSULT NOTE ADULT - SUBJECTIVE AND OBJECTIVE BOX
RITIKA ESTEVEZ  77y, Male  Allergy: No Known Allergies      All historical available data reviewed.    HPI:  Mr. Estevez 77 year old cantaneous speaking males with PMHx of CHF unknown ef, hypertension, hyperlipidemia, CAD, CKD3, CVA 8 years ago presenting for evaluation of lightheadedness, shortness of breath and vomiting for past 2 days. Daughter reports patient was seeing a cardiologist oupatient about a month ago for worsening LE bilateral edema and was diagnosed with new onset chf. Over the past week, he began feeling nausea with associated intermittent vomiting and decrease PO intake. Outpatient he was diagnosed with COVID and Rx for paxlovid, first dose was yesterday. Today he had 3 episodes of nonbloody nonbilious vomiting.  No fevers, chills, chest pain, abdominal pain, seizures, mental status changes.     In the ED, vitals BP: 124/60 HR: 52 RR: 18 Spo2 98 % on RA, afebrile. Labs significant for Na 119 (no baseline labs), creat 2.0, BNP 15k. COVID PCR positive. CXR: Left basilar consolidation. Right-sided fibrotic changes. CT chest/abd/pelvis: Bilateral peribronchial thickening. Left basilar consolidation with trace pleural effusion.Predominantly right upper lobe fibrotic changes with architectural distortion.Bilateral calcified pleural plaques.Circumferential distal esophageal wall thickening. Lobulated liver contour suggesting early cirrhotic changes.Trace abdominopelvic ascites, nonspecific. CT head: Age indeterminate right basal ganglia infarct. Rounded hyperdensity noted as noted within the left subinsular region potentially reflecting calcification though component of acute hemorrhage cannot entirely be excluded. Moderate chronic microvascular type changes as well as chronic infarcts involving the right occipital lobe and right cerebellar hemisphere. Patient given 1 L NS and admitted to ICU For further management.     Patient evaluated by NCC: hyperdense regions appear to be less likely hemorrhage and more likely calcification. The patient does NOT have any neurological deficits at the present moment on physical exam.  Recommend getting a second CT head non con 6hr post original scan on presentation and we will follow up imaging to assess for further recs.    (2023 15:56)    ID called for COVID-19     FAMILY HISTORY:    PAST MEDICAL & SURGICAL HISTORY:  HTN (hypertension)      CAD (coronary artery disease)      Acute CHF      Cerebrovascular accident (CVA)      Chronic kidney disease, unspecified CKD stage            VITALS:  T(F): 99.4, Max: 100.5 (23 @ 17:00)  HR: 85  BP: 156/69  RR: 18Vital Signs Last 24 Hrs  T(C): 37.4 (2023 07:56), Max: 38.1 (2023 17:00)  T(F): 99.4 (2023 07:56), Max: 100.5 (2023 17:00)  HR: 85 (2023 07:56) (52 - 88)  BP: 156/69 (2023 07:56) (124/60 - 163/77)  BP(mean): 101 (2023 04:58) (98 - 105)  RR: 18 (2023 07:56) (17 - 18)  SpO2: 99% (2023 07:56) (97% - 100%)    Parameters below as of 2023 07:56  Patient On (Oxygen Delivery Method): room air        TESTS & MEASUREMENTS:                        9.4    3.66  )-----------( 97       ( 2023 11:00 )             26.7     01-20    125<L>  |  96<L>  |  18  ----------------------------<  72  4.0   |  21  |  2.0<H>    Ca    8.3<L>      2023 00:33    TPro  6.0  /  Alb  3.5  /  TBili  0.4  /  DBili  x   /  AST  26  /  ALT  8   /  AlkPhos  78  01-19    LIVER FUNCTIONS - ( 2023 11:00 )  Alb: 3.5 g/dL / Pro: 6.0 g/dL / ALK PHOS: 78 U/L / ALT: 8 U/L / AST: 26 U/L / GGT: x             Urinalysis Basic - ( 2023 18:18 )    Color: Colorless / Appearance: Clear / S.012 / pH: x  Gluc: x / Ketone: Negative  / Bili: Negative / Urobili: <2 mg/dL   Blood: x / Protein: Trace / Nitrite: Negative   Leuk Esterase: Negative / RBC: x / WBC x   Sq Epi: x / Non Sq Epi: x / Bacteria: x          RADIOLOGY & ADDITIONAL TESTS:  Personal review of radiological diagnostics performed  Echo and EKG results noted when applicable.     MEDICATIONS:  pantoprazole    Tablet 40 milliGRAM(s) Oral before breakfast      ANTIBIOTICS:

## 2023-01-20 NOTE — PROGRESS NOTE ADULT - SUBJECTIVE AND OBJECTIVE BOX
Patient is a 77y old  Male who presents with a chief complaint of Hyponatremia (2023 14:20)        Over Night Events:  on RA.  off pressors.          ROS:     All ROS are negative except HPI         PHYSICAL EXAM    ICU Vital Signs Last 24 Hrs  T(C): 37.5 (2023 04:58), Max: 38.1 (2023 17:00)  T(F): 99.5 (2023 04:58), Max: 100.5 (2023 17:00)  HR: 82 (2023 04:58) (52 - 88)  BP: 161/68 (2023 04:58) (124/60 - 163/77)  BP(mean): 101 (2023 04:58) (98 - 105)  ABP: --  ABP(mean): --  RR: 17 (2023 04:58) (17 - 18)  SpO2: 100% (2023 04:58) (97% - 100%)    O2 Parameters below as of 2023 04:58  Patient On (Oxygen Delivery Method): room air            CONSTITUTIONAL:  Well nourished.  NAD    ENT:   Airway patent,   Mouth with normal mucosa.       EYES:   Pupils equal,   Round and reactive to light.    CARDIAC:   Normal rate,   Regular rhythm.    No edema      RESPIRATORY:   No wheezing  Bilateral BS  Normal chest expansion  Not tachypneic,  No use of accessory muscles    GASTROINTESTINAL:  Abdomen soft,   Non-tender,   No guarding,   + BS    MUSCULOSKELETAL:   Range of motion is not limited,  No clubbing, cyanosis    NEUROLOGICAL:   Alert  No motor  deficits.    SKIN:   Skin normal color for race,   No evidence of rash.    PSYCHIATRIC:   No apparent risk to self or others.          LABS:                            9.4    3.66  )-----------( 97       ( 2023 11:00 )             26.7                9.4    3.66  )-----------( 97       (  @ 11:00 )             26.7                                                   -20    125<L>  |  96<L>  |  18  ----------------------------<  72  4.0   |  21  |  2.0<H>    2023 00:33    125<L>  |  96<L>  |  18     ----------------------------<  72     4.0     |  21     |  2.0<H>  2023 21:15    120<L>  |  92<L>  |  18     ----------------------------<  82     3.9     |  21     |  2.0<H>    Ca    8.3<L>      2023 00:33  Ca    8.2<L>      2023 21:15    TPro  6.0    /  Alb  3.5    /  TBili  0.4    /  DBili  x      /  AST  26     /  ALT  8      /  AlkPhos  78     2023 11:00      Ca    8.3<L>      2023 00:33    TPro  6.0  /  Alb  3.5  /  TBili  0.4  /  DBili  x   /  AST  26  /  ALT  8   /  AlkPhos  78  -19      PT/INR - ( 2023 12:30 )   PT: 11.60 sec;   INR: 1.02 ratio         PTT - ( 2023 12:30 )  PTT:35.8 sec                                       Urinalysis Basic - ( 2023 18:18 )    Color: Colorless / Appearance: Clear / S.012 / pH: x  Gluc: x / Ketone: Negative  / Bili: Negative / Urobili: <2 mg/dL   Blood: x / Protein: Trace / Nitrite: Negative   Leuk Esterase: Negative / RBC: x / WBC x   Sq Epi: x / Non Sq Epi: x / Bacteria: x        CARDIAC MARKERS ( 2023 11:00 )  x     / <0.01 ng/mL / x     / x     / x                                                LIVER FUNCTIONS - ( 2023 11:00 )  Alb: 3.5 g/dL / Pro: 6.0 g/dL / ALK PHOS: 78 U/L / ALT: 8 U/L / AST: 26 U/L / GGT: x                                                                                                                                       MEDICATIONS  (STANDING):  pantoprazole    Tablet 40 milliGRAM(s) Oral before breakfast  sodium chloride 0.9%. 1000 milliLiter(s) (75 mL/Hr) IV Continuous <Continuous>    MEDICATIONS  (PRN):      New X-rays reviewed:                                                                                  ECHO

## 2023-01-20 NOTE — CONSULT NOTE ADULT - TIME BILLING
Counseled patient/wife at the bedside about diagnostic testing and treatment plan.
Review of imaging and chart; obtaining history; examination of pt; discussion and coordination of care.

## 2023-01-21 LAB
ALBUMIN SERPL ELPH-MCNC: 3.1 G/DL — LOW (ref 3.5–5.2)
ALP SERPL-CCNC: 67 U/L — SIGNIFICANT CHANGE UP (ref 30–115)
ALT FLD-CCNC: 15 U/L — SIGNIFICANT CHANGE UP (ref 0–41)
ANION GAP SERPL CALC-SCNC: 11 MMOL/L — SIGNIFICANT CHANGE UP (ref 7–14)
AST SERPL-CCNC: 41 U/L — SIGNIFICANT CHANGE UP (ref 0–41)
BASOPHILS # BLD AUTO: 0.02 K/UL — SIGNIFICANT CHANGE UP (ref 0–0.2)
BASOPHILS NFR BLD AUTO: 0.4 % — SIGNIFICANT CHANGE UP (ref 0–1)
BILIRUB SERPL-MCNC: 0.3 MG/DL — SIGNIFICANT CHANGE UP (ref 0.2–1.2)
BUN SERPL-MCNC: 25 MG/DL — HIGH (ref 10–20)
CALCIUM SERPL-MCNC: 8.3 MG/DL — LOW (ref 8.4–10.5)
CHLORIDE SERPL-SCNC: 99 MMOL/L — SIGNIFICANT CHANGE UP (ref 98–110)
CO2 SERPL-SCNC: 19 MMOL/L — SIGNIFICANT CHANGE UP (ref 17–32)
CREAT SERPL-MCNC: 1.8 MG/DL — HIGH (ref 0.7–1.5)
CULTURE RESULTS: NO GROWTH — SIGNIFICANT CHANGE UP
EGFR: 39 ML/MIN/1.73M2 — LOW
EOSINOPHIL # BLD AUTO: 0.01 K/UL — SIGNIFICANT CHANGE UP (ref 0–0.7)
EOSINOPHIL NFR BLD AUTO: 0.2 % — SIGNIFICANT CHANGE UP (ref 0–8)
GLUCOSE BLDC GLUCOMTR-MCNC: 127 MG/DL — HIGH (ref 70–99)
GLUCOSE BLDC GLUCOMTR-MCNC: 86 MG/DL — SIGNIFICANT CHANGE UP (ref 70–99)
GLUCOSE BLDC GLUCOMTR-MCNC: 90 MG/DL — SIGNIFICANT CHANGE UP (ref 70–99)
GLUCOSE SERPL-MCNC: 72 MG/DL — SIGNIFICANT CHANGE UP (ref 70–99)
HCT VFR BLD CALC: 26.3 % — LOW (ref 42–52)
HGB BLD-MCNC: 9.2 G/DL — LOW (ref 14–18)
IMM GRANULOCYTES NFR BLD AUTO: 0.2 % — SIGNIFICANT CHANGE UP (ref 0.1–0.3)
LYMPHOCYTES # BLD AUTO: 0.74 K/UL — LOW (ref 1.2–3.4)
LYMPHOCYTES # BLD AUTO: 15.4 % — LOW (ref 20.5–51.1)
MAGNESIUM SERPL-MCNC: 1.9 MG/DL — SIGNIFICANT CHANGE UP (ref 1.8–2.4)
MCHC RBC-ENTMCNC: 31.5 PG — HIGH (ref 27–31)
MCHC RBC-ENTMCNC: 35 G/DL — SIGNIFICANT CHANGE UP (ref 32–37)
MCV RBC AUTO: 90.1 FL — SIGNIFICANT CHANGE UP (ref 80–94)
MONOCYTES # BLD AUTO: 0.69 K/UL — HIGH (ref 0.1–0.6)
MONOCYTES NFR BLD AUTO: 14.3 % — HIGH (ref 1.7–9.3)
NEUTROPHILS # BLD AUTO: 3.35 K/UL — SIGNIFICANT CHANGE UP (ref 1.4–6.5)
NEUTROPHILS NFR BLD AUTO: 69.5 % — SIGNIFICANT CHANGE UP (ref 42.2–75.2)
NRBC # BLD: 0 /100 WBCS — SIGNIFICANT CHANGE UP (ref 0–0)
PLATELET # BLD AUTO: 94 K/UL — LOW (ref 130–400)
POTASSIUM SERPL-MCNC: 4 MMOL/L — SIGNIFICANT CHANGE UP (ref 3.5–5)
POTASSIUM SERPL-SCNC: 4 MMOL/L — SIGNIFICANT CHANGE UP (ref 3.5–5)
PROT SERPL-MCNC: 5.4 G/DL — LOW (ref 6–8)
RBC # BLD: 2.92 M/UL — LOW (ref 4.7–6.1)
RBC # FLD: 13.1 % — SIGNIFICANT CHANGE UP (ref 11.5–14.5)
SODIUM SERPL-SCNC: 129 MMOL/L — LOW (ref 135–146)
SPECIMEN SOURCE: SIGNIFICANT CHANGE UP
WBC # BLD: 4.82 K/UL — SIGNIFICANT CHANGE UP (ref 4.8–10.8)
WBC # FLD AUTO: 4.82 K/UL — SIGNIFICANT CHANGE UP (ref 4.8–10.8)

## 2023-01-21 PROCEDURE — 99233 SBSQ HOSP IP/OBS HIGH 50: CPT

## 2023-01-21 RX ORDER — HYDRALAZINE HCL 50 MG
25 TABLET ORAL THREE TIMES A DAY
Refills: 0 | Status: DISCONTINUED | OUTPATIENT
Start: 2023-01-21 | End: 2023-01-26

## 2023-01-21 RX ADMIN — MAGNESIUM OXIDE 400 MG ORAL TABLET 400 MILLIGRAM(S): 241.3 TABLET ORAL at 11:37

## 2023-01-21 RX ADMIN — Medication 25 MILLIGRAM(S): at 15:17

## 2023-01-21 RX ADMIN — REMDESIVIR 200 MILLIGRAM(S): 5 INJECTION INTRAVENOUS at 15:17

## 2023-01-21 RX ADMIN — PANTOPRAZOLE SODIUM 40 MILLIGRAM(S): 20 TABLET, DELAYED RELEASE ORAL at 06:05

## 2023-01-21 RX ADMIN — Medication 10 MILLILITER(S): at 21:05

## 2023-01-21 RX ADMIN — MAGNESIUM OXIDE 400 MG ORAL TABLET 400 MILLIGRAM(S): 241.3 TABLET ORAL at 08:15

## 2023-01-21 RX ADMIN — Medication 25 MILLIGRAM(S): at 21:05

## 2023-01-21 RX ADMIN — MAGNESIUM OXIDE 400 MG ORAL TABLET 400 MILLIGRAM(S): 241.3 TABLET ORAL at 17:41

## 2023-01-21 NOTE — PROVIDER CONTACT NOTE (OTHER) - SITUATION
Patient bp has slowly increasing medication was order earlier but didn't do anything to his Bp,  is busy in ED 4 he ask to give him a minute.

## 2023-01-21 NOTE — PROGRESS NOTE ADULT - SUBJECTIVE AND OBJECTIVE BOX
Patient is a 77y old  Male who presents with a chief complaint of Hyponatremia (2023 14:20)        Over Night Events:  on RA.  Off pressors.          ROS:     All ROS are negative except HPI         PHYSICAL EXAM    ICU Vital Signs Last 24 Hrs  T(C): 37.7 (2023 00:00), Max: 37.8 (2023 15:33)  T(F): 99.9 (2023 00:00), Max: 100 (2023 15:33)  HR: 74 (2023 00:00) (74 - 85)  BP: 146/68 (2023 00:00) (142/67 - 182/79)  BP(mean): 98 (2023 00:00) (97 - 98)  ABP: --  ABP(mean): --  RR: 18 (2023 00:00) (16 - 18)  SpO2: 98% (2023 00:00) (96% - 99%)    O2 Parameters below as of 2023 20:00  Patient On (Oxygen Delivery Method): room air            CONSTITUTIONAL:  Well nourished.  In NAD    ENT:   Airway patent,   Mouth with normal mucosa.       EYES:   Pupils equal,   Round and reactive to light.    CARDIAC:   Normal rate,   Regular rhythm.        RESPIRATORY:   No wheezing  Bilateral BS  Normal chest expansion  Not tachypneic,  No use of accessory muscles    GASTROINTESTINAL:  Abdomen soft,   Non-tender,   No guarding,   + BS    MUSCULOSKELETAL:   Range of motion is not limited,  No clubbing, cyanosis    NEUROLOGICAL:   Alert  No motor  deficits.    SKIN:   Skin normal color for race,   No evidence of rash.    PSYCHIATRIC:   No apparent risk to self or others.            LABS:                            8.6    3.16  )-----------( 93       ( 2023 06:06 )             24.4                  8.6    3.16  )-----------( 93       (  @ 06:06 )             24.4                9.4    3.66  )-----------( 97       ( 19 @ 11:00 )             26.7                                                     127<L>  |  97<L>  |  22<H>  ----------------------------<  99  3.9   |  21  |  1.9<H>    Creatinine Trend  BUN 22, Cr 1.9, (23 @ 11:19)  Creatinine Trend  BUN 19, Cr 2.0, (23 @ 06:06)  Creatinine Trend  BUN 18, Cr 2.0, (23 @ 00:33)  Creatinine Trend  BUN 18, Cr 2.0, (23 @ 21:15)  Creatinine Trend  BUN 19, Cr 2.0, (23 @ 11:00)      Ca    8.3<L>      2023 11:19  Mg     1.7         TPro  5.3<L>  /  Alb  3.1<L>  /  TBili  0.4  /  DBili  x   /  AST  30  /  ALT  11  /  AlkPhos  70        PT/INR - ( 2023 12:30 )   PT: 11.60 sec;   INR: 1.02 ratio         PTT - ( 2023 12:30 )  PTT:35.8 sec                                       Urinalysis Basic - ( 2023 18:18 )    Color: Colorless / Appearance: Clear / S.012 / pH: x  Gluc: x / Ketone: Negative  / Bili: Negative / Urobili: <2 mg/dL   Blood: x / Protein: Trace / Nitrite: Negative   Leuk Esterase: Negative / RBC: x / WBC x   Sq Epi: x / Non Sq Epi: x / Bacteria: x        CARDIAC MARKERS ( 2023 11:00 )  x     / <0.01 ng/mL / x     / x     / x                                                LIVER FUNCTIONS - ( 2023 06:06 )  Alb: 3.1 g/dL / Pro: 5.3 g/dL / ALK PHOS: 70 U/L / ALT: 11 U/L / AST: 30 U/L / GGT: x                                                  Culture - Blood (collected 2023 21:15)  Source: .Blood Blood  Preliminary Report (2023 02:03):    No growth to date.    Culture - Urine (collected 2023 18:18)  Source: Catheterized Catheterized  Final Report (2023 04:01):    No growth                                                                                           MEDICATIONS  (STANDING):  magnesium oxide 400 milliGRAM(s) Oral three times a day with meals  pantoprazole    Tablet 40 milliGRAM(s) Oral before breakfast  remdesivir  IVPB   IV Intermittent   remdesivir  IVPB 100 milliGRAM(s) IV Intermittent every 24 hours    MEDICATIONS  (PRN):  acetaminophen     Tablet .. 650 milliGRAM(s) Oral every 6 hours PRN Temp greater or equal to 38.5C (101.3F), Mild Pain (1 - 3)  guaifenesin/dextromethorphan Oral Liquid 10 milliLiter(s) Oral every 8 hours PRN Cough      New X-rays reviewed:                                                                                  ECHO    CXR interpreted by me:

## 2023-01-21 NOTE — PROGRESS NOTE ADULT - ASSESSMENT
IMPRESSION:  Hyponatremia improving  Covid 19 infection   Pancytopenia  KOBE vs CKD   Doubt Brain Bleed  HO CAD  HO CHF  HO CAD    PLAN:    CNS: Avoid sedatives, Repeat CTH unchanged.  NCC follow up.  SP neuroSX eval.  CTA     HEENT: Oral care    PULMONARY:  HOB @ 45 degrees.  Aspiration precautions. Goal Pox 92-96%.  Incentive spirometry     CARDIOVASCULAR: Keep Map >65. FU ECHO.  BNP Noted.  DC IVF.  Avoid overload     GI: GI prophylaxis.  Feeding; speech and swallow.  Bowel regimen     RENAL:  Follow up lytes.  Correct as needed. Monitor Sodium. Avoid overcorrection. Check urine sodium, urine osm, serum osm    INFECTIOUS DISEASE: Follow up cultures. Procal noted.  RDSV ID eval appreciated.      HEMATOLOGICAL:  DVT prophylaxis.  Dimer noted.      ENDOCRINE:  Follow up FS.  Insulin protocol if needed.    MUSCULOSKELETAL: OOB with PT OT     SDU. Downgrade after CTA

## 2023-01-21 NOTE — CHART NOTE - NSCHARTNOTEFT_GEN_A_CORE
CTA of head was not the imaging modality of choice, as patient's renal function, although improving, was not at baseline, further increasing risk of DOUGLAS.    Case discussed with neurology team, that agreed on doing the MR of head instead without gadolinium.    Order was in, waiting for it to be done.   Patient not yet downgraded from floor, pending recs from critical care team.

## 2023-01-22 LAB
ALBUMIN SERPL ELPH-MCNC: 2.8 G/DL — LOW (ref 3.5–5.2)
ALP SERPL-CCNC: 63 U/L — SIGNIFICANT CHANGE UP (ref 30–115)
ALT FLD-CCNC: 13 U/L — SIGNIFICANT CHANGE UP (ref 0–41)
ANION GAP SERPL CALC-SCNC: 8 MMOL/L — SIGNIFICANT CHANGE UP (ref 7–14)
AST SERPL-CCNC: 37 U/L — SIGNIFICANT CHANGE UP (ref 0–41)
BASOPHILS # BLD AUTO: 0.01 K/UL — SIGNIFICANT CHANGE UP (ref 0–0.2)
BASOPHILS NFR BLD AUTO: 0.2 % — SIGNIFICANT CHANGE UP (ref 0–1)
BILIRUB SERPL-MCNC: 0.2 MG/DL — SIGNIFICANT CHANGE UP (ref 0.2–1.2)
BUN SERPL-MCNC: 29 MG/DL — HIGH (ref 10–20)
CALCIUM SERPL-MCNC: 7.8 MG/DL — LOW (ref 8.4–10.4)
CHLORIDE SERPL-SCNC: 99 MMOL/L — SIGNIFICANT CHANGE UP (ref 98–110)
CO2 SERPL-SCNC: 21 MMOL/L — SIGNIFICANT CHANGE UP (ref 17–32)
CREAT SERPL-MCNC: 1.6 MG/DL — HIGH (ref 0.7–1.5)
EGFR: 44 ML/MIN/1.73M2 — LOW
EOSINOPHIL # BLD AUTO: 0.01 K/UL — SIGNIFICANT CHANGE UP (ref 0–0.7)
EOSINOPHIL NFR BLD AUTO: 0.2 % — SIGNIFICANT CHANGE UP (ref 0–8)
GLUCOSE BLDC GLUCOMTR-MCNC: 108 MG/DL — HIGH (ref 70–99)
GLUCOSE BLDC GLUCOMTR-MCNC: 128 MG/DL — HIGH (ref 70–99)
GLUCOSE BLDC GLUCOMTR-MCNC: 131 MG/DL — HIGH (ref 70–99)
GLUCOSE SERPL-MCNC: 87 MG/DL — SIGNIFICANT CHANGE UP (ref 70–99)
HCT VFR BLD CALC: 27.3 % — LOW (ref 42–52)
HGB BLD-MCNC: 9.7 G/DL — LOW (ref 14–18)
IMM GRANULOCYTES NFR BLD AUTO: 0.2 % — SIGNIFICANT CHANGE UP (ref 0.1–0.3)
LYMPHOCYTES # BLD AUTO: 0.62 K/UL — LOW (ref 1.2–3.4)
LYMPHOCYTES # BLD AUTO: 14.6 % — LOW (ref 20.5–51.1)
MAGNESIUM SERPL-MCNC: 1.8 MG/DL — SIGNIFICANT CHANGE UP (ref 1.8–2.4)
MCHC RBC-ENTMCNC: 31.6 PG — HIGH (ref 27–31)
MCHC RBC-ENTMCNC: 35.5 G/DL — SIGNIFICANT CHANGE UP (ref 32–37)
MCV RBC AUTO: 88.9 FL — SIGNIFICANT CHANGE UP (ref 80–94)
MONOCYTES # BLD AUTO: 0.76 K/UL — HIGH (ref 0.1–0.6)
MONOCYTES NFR BLD AUTO: 17.9 % — HIGH (ref 1.7–9.3)
NEUTROPHILS # BLD AUTO: 2.83 K/UL — SIGNIFICANT CHANGE UP (ref 1.4–6.5)
NEUTROPHILS NFR BLD AUTO: 66.9 % — SIGNIFICANT CHANGE UP (ref 42.2–75.2)
NRBC # BLD: 0 /100 WBCS — SIGNIFICANT CHANGE UP (ref 0–0)
PLATELET # BLD AUTO: 93 K/UL — LOW (ref 130–400)
POTASSIUM SERPL-MCNC: 3.9 MMOL/L — SIGNIFICANT CHANGE UP (ref 3.5–5)
POTASSIUM SERPL-SCNC: 3.9 MMOL/L — SIGNIFICANT CHANGE UP (ref 3.5–5)
PROT SERPL-MCNC: 5.1 G/DL — LOW (ref 6–8)
RBC # BLD: 3.07 M/UL — LOW (ref 4.7–6.1)
RBC # FLD: 13.2 % — SIGNIFICANT CHANGE UP (ref 11.5–14.5)
SODIUM SERPL-SCNC: 128 MMOL/L — LOW (ref 135–146)
WBC # BLD: 4.24 K/UL — LOW (ref 4.8–10.8)
WBC # FLD AUTO: 4.24 K/UL — LOW (ref 4.8–10.8)

## 2023-01-22 PROCEDURE — 99233 SBSQ HOSP IP/OBS HIGH 50: CPT

## 2023-01-22 RX ORDER — CHLORHEXIDINE GLUCONATE 213 G/1000ML
1 SOLUTION TOPICAL
Refills: 0 | Status: DISCONTINUED | OUTPATIENT
Start: 2023-01-22 | End: 2023-01-26

## 2023-01-22 RX ADMIN — MAGNESIUM OXIDE 400 MG ORAL TABLET 400 MILLIGRAM(S): 241.3 TABLET ORAL at 13:34

## 2023-01-22 RX ADMIN — MAGNESIUM OXIDE 400 MG ORAL TABLET 400 MILLIGRAM(S): 241.3 TABLET ORAL at 11:14

## 2023-01-22 RX ADMIN — Medication 25 MILLIGRAM(S): at 07:03

## 2023-01-22 RX ADMIN — CHLORHEXIDINE GLUCONATE 1 APPLICATION(S): 213 SOLUTION TOPICAL at 11:15

## 2023-01-22 RX ADMIN — Medication 25 MILLIGRAM(S): at 13:34

## 2023-01-22 RX ADMIN — MAGNESIUM OXIDE 400 MG ORAL TABLET 400 MILLIGRAM(S): 241.3 TABLET ORAL at 17:52

## 2023-01-22 RX ADMIN — PANTOPRAZOLE SODIUM 40 MILLIGRAM(S): 20 TABLET, DELAYED RELEASE ORAL at 07:03

## 2023-01-22 RX ADMIN — REMDESIVIR 200 MILLIGRAM(S): 5 INJECTION INTRAVENOUS at 12:27

## 2023-01-22 RX ADMIN — Medication 25 MILLIGRAM(S): at 21:29

## 2023-01-22 NOTE — PATIENT PROFILE ADULT - FALL HARM RISK - HARM RISK INTERVENTIONS

## 2023-01-22 NOTE — PATIENT PROFILE ADULT - PUBLIC BENEFITS
ED Note      Patient : Maria E Tabor Age: 9 year old Sex: female   MRN: [id number] Encounter Date: 10/30/2021      History     Chief Complaint   Patient presents with   • Laceration     head/ fall from running/ No LOC       HPI      9 year old female presents to the ED for eval of head injury.  Hx per pt and her parents. Injury 30 mins PTA. Pt was at home and playing with her nephew and was running and tripped and struck head on edge of refrigerator   and loc. Acting normally.  No arm or leg or chest or abd or neck or back pain. No NV.  No HA or dizziness. Pain only to area over the \"cut.\" Nothing  makes better and  nothing makes worse. No intervention PTA    Pt was seen with myself in PPE.       Hx none  Sx  none  Allergy none  Meds none  Social lives with family. Student  PCP  [facility]      No Known Allergies    No past medical history on file.    No past surgical history on file.    No family history on file.    Social History     Tobacco Use   • Smoking status: Not on file   Substance Use Topics   • Alcohol use: Not on file   • Drug use: Not on file             Review of Systems      Constitutional symptoms:  No fever, no chills.    Skin symptoms:  No rash,    Eye symptoms:  No recent vision problems, no icterus.    ENMT symptoms:  No ear pain, no sore throat, no nasal congestion.   Respiratory symptoms:  No shortness of breath, no cough.    Cardiovascular symptoms:  No chest pain,   Gastrointestinal symptoms:  No abd pain, no nausea, no vomiting,   Genitourinary symptoms: urinating normally  Musculoskeletal symptoms:  No back pain,    Neurologic symptoms:  No headache, no dizziness.  See HPI  Allergy/immunologic symptoms:  No recurrent infections,        Physical Exam     ED Triage Vitals [10/30/21 2142]   ED Triage Vitals Group      Temp 98.2 °F (36.8 °C)      Heart Rate 97      Resp 26      /75      SpO2 100 %      EtCO2 mmHg       Height       Weight 74 lb 4.7 oz (33.7 kg)      Weight Scale  Used Standing scale      BMI (Calculated)       IBW/kg (Calculated)        Vitals:    10/30/21 2142   BP: 111/75   Pulse: 97   Resp: 26   Temp: 98.2 °F (36.8 °C)   SpO2: 100%   Weight: 33.7 kg (74 lb 4.7 oz)        Physical Exam    General:  Alert, appropriate for age.    Skin:  Normal for ethnicity.   Head:  3 cm left parietal scalp laceration noted, No cephalohematoma, no facial grimace with palpation to area. No palpable bony deformity    Neck:  Supple, trachea midline, no facial grimace with palpation .    Eye:  Normal conjunctiva, pupil equal and reactive  .    Ears, nose, mouth and throat:  Oral mucosa moist, no pharyngeal erythema or exudate. No hemotympanum   Respiratory:  Respirations are non-labored, pulse ox 99 percent normal and not hypoxic.    Gastrointestinal:  Soft, no facial grimace with palpation .    Back:  Normal range of motion, no facial grimace with palpation .    Musculoskeletal:  Normal ROM, no swelling, no facial grimace with palpation to bilat UE and LE. Denies any pain    Neurological:  NOrmal per age.   Lymphatics:  No cervical  lymphadenopathy.       Lab Results     No results found for this visit on 10/30/21.        EKG Results     No results found for this or any previous visit (from the past 4464 hour(s)).       Radiology Results     Imaging Results    None              ED Course   Procedures     3 cm left parietal scalp  laceration was cleaned and irrigated with copious amount of saline and explored in bloodless field and no foreign body was noted. Using 5 staples  was placed to approximated wound edges and establish hemostasis. Bacitracin  applied         ED Medication Orders (From admission, onward)    Ordered Start     Status Ordering Provider    10/30/21 2157 10/30/21 2200  lido-EPINEPHrine-tetracaine (L.E.T.) topical gel 6 mL  ONCE         Last MAR action: Given AZUL FRIEDMAN               Medical Decision Making     MDM  Orders Placed This Encounter   • Misc nursing order  (specify)     Clean and irrigate wound   • lido-EPINEPHrine-tetracaine (L.E.T.) topical gel 6 mL   Patient hx and  exam and mechanism of injury as above  Laceration to the left parietal scalp  No cephalohematoma.  No palpable bony deformity.    Normal neurological exam and no deficits.   No neck or back or extremities or chest or abd injury.  Laceration closed with sutures.  Do not suspect intracranial hemorrhage or skull fracture.   Do not think imaging is needed.   Wound care, head injury instruction, return precaution follow-up instruction discussed with patient's mother and comfortable plan      Clinical Impression     ED Diagnosis   1. Laceration of scalp, initial encounter     2. Injury of head, initial encounter         Disposition      Follow up with primary care provider on Tuesday  Return for persistent vomiting, behavioral changes, difficulty moving, acting differently or changes or concerns  Tylenol   and Ibuprofen  may alternate every 3  hrs for headahce  Wake up every 3  hour for 48  hours.     Avoid any activity with the potential of striking head  Staples out in 1 week by your primary care provider.  Leave the wound alone for the first 24 hours then Neosporin twice a day.  Observe for purulent drainage, foul odors, redness, warmth to the area if present return to ED for eval  Discharge 10/30/2021 10:05 PM  Mraia E Tabor discharge to home/self care.          Follow Up:  No follow-ups on file.     Discharge Meds:  There are no discharge medications for this patient.         Quinten Conte CNP   10/30/2021 9:49 PM     Please note: This note was dictated using speech recognition software. Accuracy and grammar in transcription may be subject to error.                       Quinten Conte CNP  10/30/21 9164     yes

## 2023-01-22 NOTE — PROGRESS NOTE ADULT - SUBJECTIVE AND OBJECTIVE BOX
Patient is a 77y old  Male who presents with a chief complaint of Hyponatremia (19 Jan 2023 14:20)        Over Night Events:  NO acute events overnight      ROS:     All ROS are negative except HPI         PHYSICAL EXAM    ICU Vital Signs Last 24 Hrs  T(C): 37.1 (22 Jan 2023 08:00), Max: 37.4 (21 Jan 2023 11:00)  T(F): 98.8 (22 Jan 2023 08:00), Max: 99.3 (21 Jan 2023 11:00)  HR: 91 (22 Jan 2023 08:00) (72 - 96)  BP: 156/72 (22 Jan 2023 08:00) (146/104 - 196/89)  BP(mean): 104 (22 Jan 2023 08:00) (103 - 128)  ABP: --  ABP(mean): --  RR: 18 (22 Jan 2023 08:00) (16 - 20)  SpO2: 99% (22 Jan 2023 08:00) (97% - 99%)    O2 Parameters below as of 22 Jan 2023 08:00  Patient On (Oxygen Delivery Method): room air            CONSTITUTIONAL:  Well nourished.    In NAD    ENT:   Airway patent,   Mouth with normal mucosa.   No thrush      EYES:   Pupils equal,   Round and reactive to light.    CARDIAC:   Normal rate,   Regular rhythm.    No edema    RESPIRATORY:   No wheezing  Bilateral BS  Normal chest expansion  Not tachypneic,  No use of accessory muscles    GASTROINTESTINAL:  Abdomen soft,   Non-tender,   No guarding,     MUSCULOSKELETAL:   Range of motion is not limited,  No clubbing, cyanosis    NEUROLOGICAL:   Alert and oriented   No motor  deficits.    SKIN:   Skin normal color for race,   Warm and dry and intact.   No evidence of rash.    PSYCHIATRIC:   No apparent risk to self or others.      01-21-23 @ 07:01  -  01-22-23 @ 07:00  --------------------------------------------------------  IN:  Total IN: 0 mL    OUT:    Voided (mL): 200 mL  Total OUT: 200 mL    Total NET: -200 mL          LABS:                            9.7    4.24  )-----------( 93       ( 22 Jan 2023 06:18 )             27.3                                               01-22    128<L>  |  99  |  29<H>  ----------------------------<  87  3.9   |  21  |  1.6<H>    Ca    7.8<L>      22 Jan 2023 06:18  Mg     1.8     01-22    TPro  5.1<L>  /  Alb  2.8<L>  /  TBili  0.2  /  DBili  x   /  AST  37  /  ALT  13  /  AlkPhos  63  01-22                                                                                           LIVER FUNCTIONS - ( 22 Jan 2023 06:18 )  Alb: 2.8 g/dL / Pro: 5.1 g/dL / ALK PHOS: 63 U/L / ALT: 13 U/L / AST: 37 U/L / GGT: x                                                  Culture - Blood (collected 20 Jan 2023 06:06)  Source: .Blood None  Preliminary Report (21 Jan 2023 17:01):    No growth to date.    Culture - Blood (collected 19 Jan 2023 21:15)  Source: .Blood Blood  Preliminary Report (21 Jan 2023 02:03):    No growth to date.    Culture - Urine (collected 19 Jan 2023 18:18)  Source: Catheterized Catheterized  Final Report (21 Jan 2023 04:01):    No growth                                                                                           MEDICATIONS  (STANDING):  hydrALAZINE 25 milliGRAM(s) Oral three times a day  magnesium oxide 400 milliGRAM(s) Oral three times a day with meals  pantoprazole    Tablet 40 milliGRAM(s) Oral before breakfast  remdesivir  IVPB   IV Intermittent   remdesivir  IVPB 100 milliGRAM(s) IV Intermittent every 24 hours    MEDICATIONS  (PRN):  acetaminophen     Tablet .. 650 milliGRAM(s) Oral every 6 hours PRN Temp greater or equal to 38.5C (101.3F), Mild Pain (1 - 3)  guaifenesin/dextromethorphan Oral Liquid 10 milliLiter(s) Oral every 8 hours PRN Cough      New X-rays reviewed:                                                                                  ECHO    CXR interpreted by me:

## 2023-01-22 NOTE — PATIENT PROFILE ADULT - NSTRANSFERBELONGINGSRESP_GEN_A_NUR
Chart reviewed.   Requested updates from Care Everywhere.  Immunizations reconciled.   Order placed for diabetic eye screening photo.   updated.     yes

## 2023-01-22 NOTE — PROGRESS NOTE ADULT - ASSESSMENT
IMPRESSION:  Hyponatremia improving  Covid 19 infection   Pancytopenia  KOBE vs CKD   Doubt Brain Bleed  HO CAD  HO CHF  HO CAD    PLAN:    CNS: Avoid sedatives, Repeat CTH unchanged.  NCC follow up.  SP neuroSX eval.  MRI     HEENT: Oral care    PULMONARY:  HOB @ 45 degrees.  Aspiration precautions. Goal Pox 92-96%.  Incentive spirometry     CARDIOVASCULAR: Keep Map >65. FU ECHO.   Avoid overload     GI: GI prophylaxis.  Feeding; speech and swallow.  Bowel regimen     RENAL:  Follow up lytes.  Correct as needed. Monitor Sodium. Avoid overcorrection. Check urine sodium, urine osm, serum osm    INFECTIOUS DISEASE: Follow up cultures. Procal noted.  RDSV.       HEMATOLOGICAL:  DVT prophylaxis.     ENDOCRINE:  Follow up FS.  Insulin protocol if needed.    MUSCULOSKELETAL: OOB with PT OT     DG to floor       IMPRESSION:  Hyponatremia improving  Covid 19 infection   Pancytopenia  KOBE vs CKD   Doubt Brain Bleed  HO CAD  HO CHF  HO CAD    PLAN:    CNS: Avoid sedatives, Repeat CTH unchanged. SP neuroSX eval.  MRI     HEENT: Oral care    PULMONARY:  HOB @ 45 degrees.  Aspiration precautions. Goal Pox 92-96%.  Incentive spirometry     CARDIOVASCULAR: Keep Map >65. FU ECHO.   Avoid overload     GI: GI prophylaxis.  Feeding; speech and swallow.  Bowel regimen     RENAL:  Follow up lytes.  Correct as needed. Monitor Sodium. Avoid overcorrection. Check urine sodium, urine osm, serum osm    INFECTIOUS DISEASE: Follow up cultures. Procal noted.  RDSV.       HEMATOLOGICAL:  DVT prophylaxis.     ENDOCRINE:  Follow up FS.  Insulin protocol if needed.    MUSCULOSKELETAL: OOB with PT OT     DG to floor

## 2023-01-22 NOTE — CHART NOTE - NSCHARTNOTEFT_GEN_A_CORE
Transfer Note    Transfer from: CEU  Transfer to: medicine        HOSPITAL COURSE:  Mr. Estevez 77 year old cantonese speaking males with PMHx of CHF unknown ef, hypertension, hyperlipidemia, CAD, CKD3, CVA 8 years ago presenting for evaluation of lightheadedness, shortness of breath and vomiting for past 2 days. Daughter reports patient was seeing a cardiologist oupatient about a month ago for worsening LE bilateral edema and was diagnosed with new onset chf. Over the past week, he began feeling nausea with associated intermittent vomiting and decrease PO intake. Outpatient he was diagnosed with COVID and Rx for paxlovid, first dose was yesterday. Today he had 3 episodes of nonbloody nonbilious vomiting.  No fevers, chills, chest pain, abdominal pain, seizures, mental status changes.     In the ED, vitals BP: 124/60 HR: 52 RR: 18 Spo2 98 % on RA, afebrile. Labs significant for Na 119 (no baseline labs), creat 2.0, BNP 15k. COVID PCR positive. CXR: Left basilar consolidation. Right-sided fibrotic changes. CT chest/abd/pelvis: Bilateral peribronchial thickening. Left basilar consolidation with trace pleural effusion.Predominantly right upper lobe fibrotic changes with architectural distortion. Bilateral calcified pleural plaques.Circumferential distal esophageal wall thickening. Lobulated liver contour suggesting early cirrhotic changes.Trace abdominopelvic ascites, nonspecific. CT head: Age indeterminate right basal ganglia infarct. Rounded hyperdensity noted as noted within the left subinsular region potentially reflecting calcification though component of acute hemorrhage cannot entirely be excluded. Moderate chronic microvascular type changes as well as chronic infarcts involving the right occipital lobe and right cerebellar hemisphere. Patient given 1 L NS and admitted to ICU For further management.     Patient evaluated by NCC: hyperdense regions appear to be less likely hemorrhage and more likely calcification. The patient does NOT have any neurological deficits at the present moment on physical exam.  Recommend getting a second CT head non con 6hr post original scan on presentation and we will follow up imaging to assess for further recs.     Patient admitted to ICU for 1 day, in which sodium improved to 125 and repeat CT head was stable < from: CT Head No Cont (01.19.23 @ 15:58) >    No significant change in comparison to recent same day head CT: Redemonstration of a rounded hyperdensity within the left subinsular region potentially reflecting calcification, acute hemorrhage or possibly   an aneurysm. Age indeterminate right basal ganglia infarct right occipital and right cerebellar hemisphere. Patient evaluated by neurology, neurocritical care and neurosurgery.    SDU course:   No CTA was done due to pt KOBE. Pt pending MRI Head.  Pt stable to downgrade to the floor.       For Follow-Up:  -MRH   -trend Na  -restart DVt ppx     ASSESSMENT & PLAN:     Mr. Estevez 77 year old cantonese speaking males with PMHx of CHF unknown ef, hypertension, hyperlipidemia, CAD, CKD3, CVA 8 years ago presenting for evaluation of lightheadedness, shortness of breath and vomiting for past 2 days.      #Hyponatremia most likely hypovolemic in setting of COVID and poor po intake/emesis   - s/p NS hydration with appropriate correction  - monitor of NS  - f/u urine lytes  - trend BMP    #Thalamic hyperdensites r/o intracranial hemorrhage?  - Repeat CT head 6 hours laters: Redemonstration of a rounded hyperdensity within the left subinsular region potentially reflecting calcification, acute hemorrhage or possibly   an aneurysm. Age indeterminate right basal ganglia infarct right occipital and right cerebellar hemisphere.  - Neurology requesting CTA head and neck (ordered) f/u recommendations  - f/u neurosurgery and neurocrit recommendations   HOLD antiplatelet and anticoagulation until cleared by neurocrit       #covid infection  - not hypoxic   - started on remdesvir -> d/c on plaxlovid  - no need for abx and steroids  - ID recommendations appreciated     #pancytopenia 2/2 to multifactorial   - trend cbc   - keep type and screen       #kobe vs CKD  - trend urine lytes  - avoid nephrotoxic agents   - s/p ns iv hydration   - CT abd/pelvis: no hydro    #CAD/CHF  - f/u ECHO  - avoid volume overload  - restart home meds once BP stablizes as per neuro         Physical Exam:   GENERAL: NAD, lying in bed comfortably  HEAD:  Atraumatic, Normocephalic  ENT: moist mucous membranes  CHEST/LUNG: Clear to auscultation bilaterally;   HEART: Regular rate and rhythm; No murmurs, rubs, or gallops  ABDOMEN: Soft, Nontender, Nondistended.   EXTREMITIES:  No clubbing, cyanosis, or edema  NERVOUS SYSTEM:  Alert         Vital Signs Last 24 Hrs  T(C): 37.1 (22 Jan 2023 08:00), Max: 37.4 (21 Jan 2023 20:00)  T(F): 98.8 (22 Jan 2023 08:00), Max: 99.3 (21 Jan 2023 20:00)  HR: 91 (22 Jan 2023 08:00) (72 - 96)  BP: 156/72 (22 Jan 2023 08:00) (146/104 - 196/89)  BP(mean): 104 (22 Jan 2023 08:00) (103 - 128)  RR: 18 (22 Jan 2023 08:00) (16 - 20)  SpO2: 99% (22 Jan 2023 08:00) (97% - 99%)    Parameters below as of 22 Jan 2023 08:00  Patient On (Oxygen Delivery Method): room air      I&O's Summary    21 Jan 2023 07:01  -  22 Jan 2023 07:00  --------------------------------------------------------  IN: 0 mL / OUT: 200 mL / NET: -200 mL          MEDICATIONS  (STANDING):  chlorhexidine 2% Cloths 1 Application(s) Topical <User Schedule>  hydrALAZINE 25 milliGRAM(s) Oral three times a day  magnesium oxide 400 milliGRAM(s) Oral three times a day with meals  pantoprazole    Tablet 40 milliGRAM(s) Oral before breakfast  remdesivir  IVPB   IV Intermittent   remdesivir  IVPB 100 milliGRAM(s) IV Intermittent every 24 hours    MEDICATIONS  (PRN):  acetaminophen     Tablet .. 650 milliGRAM(s) Oral every 6 hours PRN Temp greater or equal to 38.5C (101.3F), Mild Pain (1 - 3)  guaifenesin/dextromethorphan Oral Liquid 10 milliLiter(s) Oral every 8 hours PRN Cough        LABS                                            9.7                   Neurophils% (auto):   66.9   (01-22 @ 06:18):    4.24 )-----------(93           Lymphocytes% (auto):  14.6                                          27.3                   Eosinphils% (auto):   0.2      Manual%: Neutrophils x    ; Lymphocytes x    ; Eosinophils x    ; Bands%: x    ; Blasts x                                    128    |  99     |  29                  Calcium: 7.8   / iCa: x      (01-22 @ 06:18)    ----------------------------<  87        Magnesium: 1.8                              3.9     |  21     |  1.6              Phosphorous: x        TPro  5.1    /  Alb  2.8    /  TBili  0.2    /  DBili  x      /  AST  37     /  ALT  13     /  AlkPhos  63     22 Jan 2023 06:18 Transfer Note    Transfer from: CEU  Transfer to: medicine        HOSPITAL COURSE:  Mr. Estevez 77 year old cantonese speaking males with PMHx of CHF unknown ef, hypertension, hyperlipidemia, CAD, CKD3, CVA 8 years ago presenting for evaluation of lightheadedness, shortness of breath and vomiting for past 2 days. Daughter reports patient was seeing a cardiologist oupatient about a month ago for worsening LE bilateral edema and was diagnosed with new onset chf. Over the past week, he began feeling nausea with associated intermittent vomiting and decrease PO intake. Outpatient he was diagnosed with COVID and Rx for paxlovid, first dose was yesterday. Today he had 3 episodes of nonbloody nonbilious vomiting.  No fevers, chills, chest pain, abdominal pain, seizures, mental status changes.     In the ED, vitals BP: 124/60 HR: 52 RR: 18 Spo2 98 % on RA, afebrile. Labs significant for Na 119 (no baseline labs), creat 2.0, BNP 15k. COVID PCR positive. CXR: Left basilar consolidation. Right-sided fibrotic changes. CT chest/abd/pelvis: Bilateral peribronchial thickening. Left basilar consolidation with trace pleural effusion. Predominantly right upper lobe fibrotic changes with architectural distortion. Bilateral calcified pleural plaques. Circumferential distal esophageal wall thickening. Lobulated liver contour suggesting early cirrhotic changes. Trace abdominopelvic ascites, nonspecific. CT head: Age indeterminate right basal ganglia infarct. Rounded hyperdensity noted as noted within the left subinsular region potentially reflecting calcification though component of acute hemorrhage cannot entirely be excluded. Moderate chronic microvascular type changes as well as chronic infarcts involving the right occipital lobe and right cerebellar hemisphere. Patient given 1 L NS and admitted to ICU For further management.     Patient evaluated by NCC: hyperdense regions appear to be less likely hemorrhage and more likely calcification. The patient does NOT have any neurological deficits at the present moment on physical exam.  Recommend getting a second CT head non con 6hr post original scan on presentation and we will follow up imaging to assess for further recs.     Patient admitted to ICU for 1 day, in which sodium improved to 125 and repeat CT head was stable < from: CT Head No Cont (01.19.23 @ 15:58) >    No significant change in comparison to recent same day head CT: Redemonstration of a rounded hyperdensity within the left subinsular region potentially reflecting calcification, acute hemorrhage or possibly   an aneurysm. Age indeterminate right basal ganglia infarct right occipital and right cerebellar hemisphere. Patient evaluated by neurology, neurocritical care and neurosurgery.    SDU course:   No CTA was done due to pt KOBE. Pt pending MRI Head.  Pt stable to downgrade to the floor.       For Follow-Up:  -MRH   -trend Na  -restart DVt ppx     ASSESSMENT & PLAN:     Mr. Estevez 77 year old cantonese speaking males with PMHx of CHF unknown ef, hypertension, hyperlipidemia, CAD, CKD3, CVA 8 years ago presenting for evaluation of lightheadedness, shortness of breath and vomiting for past 2 days.      #Hyponatremia most likely hypovolemic in setting of COVID and poor po intake/emesis   - s/p NS hydration with appropriate correction  - monitor of NS  - f/u urine lytes  - trend BMP    #Thalamic hyperdensites r/o intracranial hemorrhage?  - Repeat CT head 6 hours laters: Redemonstration of a rounded hyperdensity within the left subinsular region potentially reflecting calcification, acute hemorrhage or possibly   an aneurysm. Age indeterminate right basal ganglia infarct right occipital and right cerebellar hemisphere.  - evaluated by Neurology and NS  - MR of brain and MRA of head without contrast ordered since pt with KOBE  - OK for DVT prophylaxis per NS attending    #covid infection  - not hypoxic   - completed course of remdesvir   - no need for abx and steroids  - ID recommendations appreciated   - isolation per hospital protocol    #pancytopenia 2/2 to multifactorial   - trend cbc   - keep type and screen     #kobe vs CKD  - check urine lytes  - avoid nephrotoxic agents   - s/p ns iv hydration with improvement Cr 2->1.6   - CT abd/pelvis: no hydronephrosis    #CAD/CHF - unknown EF  - f/u ECHO  - avoid volume overload  - restart home meds once BP stabilizes as per neuro     #HTN - home meds reviewed: losartan, furosemide, hydralazine  hydralazine 25mg q8hr restarted and monitor BP  can add amlodipine if needed        Physical Exam:   GENERAL: NAD, lying in bed comfortably  HEAD:  Atraumatic, Normocephalic  ENT: moist mucous membranes  CHEST/LUNG: Clear to auscultation bilaterally;   HEART: Regular rate and rhythm; No murmurs, rubs, or gallops  ABDOMEN: Soft, Nontender, Nondistended.   EXTREMITIES:  No clubbing, cyanosis, or edema  NERVOUS SYSTEM:  Alert         Vital Signs Last 24 Hrs  T(C): 37.1 (22 Jan 2023 08:00), Max: 37.4 (21 Jan 2023 20:00)  T(F): 98.8 (22 Jan 2023 08:00), Max: 99.3 (21 Jan 2023 20:00)  HR: 91 (22 Jan 2023 08:00) (72 - 96)  BP: 156/72 (22 Jan 2023 08:00) (146/104 - 196/89)  BP(mean): 104 (22 Jan 2023 08:00) (103 - 128)  RR: 18 (22 Jan 2023 08:00) (16 - 20)  SpO2: 99% (22 Jan 2023 08:00) (97% - 99%)    Parameters below as of 22 Jan 2023 08:00  Patient On (Oxygen Delivery Method): room air      I&O's Summary    21 Jan 2023 07:01  -  22 Jan 2023 07:00  --------------------------------------------------------  IN: 0 mL / OUT: 200 mL / NET: -200 mL          MEDICATIONS  (STANDING):  chlorhexidine 2% Cloths 1 Application(s) Topical <User Schedule>  hydrALAZINE 25 milliGRAM(s) Oral three times a day  magnesium oxide 400 milliGRAM(s) Oral three times a day with meals  pantoprazole    Tablet 40 milliGRAM(s) Oral before breakfast  remdesivir  IVPB   IV Intermittent   remdesivir  IVPB 100 milliGRAM(s) IV Intermittent every 24 hours    MEDICATIONS  (PRN):  acetaminophen     Tablet .. 650 milliGRAM(s) Oral every 6 hours PRN Temp greater or equal to 38.5C (101.3F), Mild Pain (1 - 3)  guaifenesin/dextromethorphan Oral Liquid 10 milliLiter(s) Oral every 8 hours PRN Cough        LABS                                            9.7                   Neurophils% (auto):   66.9   (01-22 @ 06:18):    4.24 )-----------(93           Lymphocytes% (auto):  14.6                                          27.3                   Eosinphils% (auto):   0.2      Manual%: Neutrophils x    ; Lymphocytes x    ; Eosinophils x    ; Bands%: x    ; Blasts x                                    128    |  99     |  29                  Calcium: 7.8   / iCa: x      (01-22 @ 06:18)    ----------------------------<  87        Magnesium: 1.8                              3.9     |  21     |  1.6              Phosphorous: x        TPro  5.1    /  Alb  2.8    /  TBili  0.2    /  DBili  x      /  AST  37     /  ALT  13     /  AlkPhos  63     22 Jan 2023 06:18      Attending addendum  Pt seen and examined in CEU this morning. Previous notes reviewed by me.  Spoke to pt via Cantonese  today. He c/o SOB and productive cough with thick white phlegm  + left sided headache after CVA in the past  denied other pain, N/V, dizziness  Wants his daughter to stay with him. Reminded pt to use call bell if he needs assistance.    PE VS reviewed  general - NAD in bed  HEENT- anicteric, MMM, mask in place  chest - some fine crackles at bases otherwise CTA  CVS - RRR  abdomen - soft, NT, ND  extremities - no edema LE  neuro - awake, alert, cooperative, moving all extremities, did not appreciate facial droop    labs and imaging reports reviewed by me    Assessment and plan as above with edits and corrections

## 2023-01-22 NOTE — PROGRESS NOTE ADULT - ATTENDING COMMENTS
IMPRESSION:  Hyponatremia improving  Covid 19 infection   Pancytopenia  KOBE vs CKD   Doubt Brain Bleed  HO CAD  HO CHF  HO CAD    Plan as outlined above

## 2023-01-23 LAB
ALBUMIN SERPL ELPH-MCNC: 2.8 G/DL — LOW (ref 3.5–5.2)
ALP SERPL-CCNC: 67 U/L — SIGNIFICANT CHANGE UP (ref 30–115)
ALT FLD-CCNC: 13 U/L — SIGNIFICANT CHANGE UP (ref 0–41)
ANION GAP SERPL CALC-SCNC: 5 MMOL/L — LOW (ref 7–14)
AST SERPL-CCNC: 32 U/L — SIGNIFICANT CHANGE UP (ref 0–41)
BASOPHILS # BLD AUTO: 0.01 K/UL — SIGNIFICANT CHANGE UP (ref 0–0.2)
BASOPHILS NFR BLD AUTO: 0.3 % — SIGNIFICANT CHANGE UP (ref 0–1)
BILIRUB SERPL-MCNC: 0.3 MG/DL — SIGNIFICANT CHANGE UP (ref 0.2–1.2)
BUN SERPL-MCNC: 31 MG/DL — HIGH (ref 10–20)
CALCIUM SERPL-MCNC: 8 MG/DL — LOW (ref 8.4–10.4)
CHLORIDE SERPL-SCNC: 101 MMOL/L — SIGNIFICANT CHANGE UP (ref 98–110)
CO2 SERPL-SCNC: 23 MMOL/L — SIGNIFICANT CHANGE UP (ref 17–32)
CREAT SERPL-MCNC: 1.5 MG/DL — SIGNIFICANT CHANGE UP (ref 0.7–1.5)
EGFR: 48 ML/MIN/1.73M2 — LOW
EOSINOPHIL # BLD AUTO: 0.03 K/UL — SIGNIFICANT CHANGE UP (ref 0–0.7)
EOSINOPHIL NFR BLD AUTO: 0.8 % — SIGNIFICANT CHANGE UP (ref 0–8)
GLUCOSE BLDC GLUCOMTR-MCNC: 106 MG/DL — HIGH (ref 70–99)
GLUCOSE BLDC GLUCOMTR-MCNC: 141 MG/DL — HIGH (ref 70–99)
GLUCOSE BLDC GLUCOMTR-MCNC: 84 MG/DL — SIGNIFICANT CHANGE UP (ref 70–99)
GLUCOSE BLDC GLUCOMTR-MCNC: 99 MG/DL — SIGNIFICANT CHANGE UP (ref 70–99)
GLUCOSE SERPL-MCNC: 85 MG/DL — SIGNIFICANT CHANGE UP (ref 70–99)
HCT VFR BLD CALC: 27.8 % — LOW (ref 42–52)
HGB BLD-MCNC: 9.9 G/DL — LOW (ref 14–18)
IMM GRANULOCYTES NFR BLD AUTO: 0.3 % — SIGNIFICANT CHANGE UP (ref 0.1–0.3)
LYMPHOCYTES # BLD AUTO: 0.63 K/UL — LOW (ref 1.2–3.4)
LYMPHOCYTES # BLD AUTO: 16.5 % — LOW (ref 20.5–51.1)
MAGNESIUM SERPL-MCNC: 2 MG/DL — SIGNIFICANT CHANGE UP (ref 1.8–2.4)
MCHC RBC-ENTMCNC: 31.8 PG — HIGH (ref 27–31)
MCHC RBC-ENTMCNC: 35.6 G/DL — SIGNIFICANT CHANGE UP (ref 32–37)
MCV RBC AUTO: 89.4 FL — SIGNIFICANT CHANGE UP (ref 80–94)
MONOCYTES # BLD AUTO: 0.54 K/UL — SIGNIFICANT CHANGE UP (ref 0.1–0.6)
MONOCYTES NFR BLD AUTO: 14.2 % — HIGH (ref 1.7–9.3)
NEUTROPHILS # BLD AUTO: 2.59 K/UL — SIGNIFICANT CHANGE UP (ref 1.4–6.5)
NEUTROPHILS NFR BLD AUTO: 67.9 % — SIGNIFICANT CHANGE UP (ref 42.2–75.2)
NRBC # BLD: 0 /100 WBCS — SIGNIFICANT CHANGE UP (ref 0–0)
PLATELET # BLD AUTO: 90 K/UL — LOW (ref 130–400)
POTASSIUM SERPL-MCNC: 3.9 MMOL/L — SIGNIFICANT CHANGE UP (ref 3.5–5)
POTASSIUM SERPL-SCNC: 3.9 MMOL/L — SIGNIFICANT CHANGE UP (ref 3.5–5)
PROT SERPL-MCNC: 5.4 G/DL — LOW (ref 6–8)
RBC # BLD: 3.11 M/UL — LOW (ref 4.7–6.1)
RBC # FLD: 13.3 % — SIGNIFICANT CHANGE UP (ref 11.5–14.5)
SODIUM SERPL-SCNC: 129 MMOL/L — LOW (ref 135–146)
WBC # BLD: 3.81 K/UL — LOW (ref 4.8–10.8)
WBC # FLD AUTO: 3.81 K/UL — LOW (ref 4.8–10.8)

## 2023-01-23 PROCEDURE — 70544 MR ANGIOGRAPHY HEAD W/O DYE: CPT | Mod: 26,59

## 2023-01-23 PROCEDURE — 99232 SBSQ HOSP IP/OBS MODERATE 35: CPT

## 2023-01-23 PROCEDURE — 70551 MRI BRAIN STEM W/O DYE: CPT | Mod: 26

## 2023-01-23 RX ORDER — FUROSEMIDE 40 MG
20 TABLET ORAL DAILY
Refills: 0 | Status: DISCONTINUED | OUTPATIENT
Start: 2023-01-24 | End: 2023-01-26

## 2023-01-23 RX ORDER — LOSARTAN POTASSIUM 100 MG/1
25 TABLET, FILM COATED ORAL DAILY
Refills: 0 | Status: DISCONTINUED | OUTPATIENT
Start: 2023-01-23 | End: 2023-01-24

## 2023-01-23 RX ORDER — HEPARIN SODIUM 5000 [USP'U]/ML
5000 INJECTION INTRAVENOUS; SUBCUTANEOUS EVERY 12 HOURS
Refills: 0 | Status: DISCONTINUED | OUTPATIENT
Start: 2023-01-23 | End: 2023-01-26

## 2023-01-23 RX ADMIN — MAGNESIUM OXIDE 400 MG ORAL TABLET 400 MILLIGRAM(S): 241.3 TABLET ORAL at 17:17

## 2023-01-23 RX ADMIN — Medication 25 MILLIGRAM(S): at 05:36

## 2023-01-23 RX ADMIN — PANTOPRAZOLE SODIUM 40 MILLIGRAM(S): 20 TABLET, DELAYED RELEASE ORAL at 05:36

## 2023-01-23 RX ADMIN — Medication 25 MILLIGRAM(S): at 13:01

## 2023-01-23 RX ADMIN — Medication 25 MILLIGRAM(S): at 21:44

## 2023-01-23 RX ADMIN — HEPARIN SODIUM 5000 UNIT(S): 5000 INJECTION INTRAVENOUS; SUBCUTANEOUS at 17:17

## 2023-01-23 RX ADMIN — MAGNESIUM OXIDE 400 MG ORAL TABLET 400 MILLIGRAM(S): 241.3 TABLET ORAL at 12:02

## 2023-01-23 RX ADMIN — MAGNESIUM OXIDE 400 MG ORAL TABLET 400 MILLIGRAM(S): 241.3 TABLET ORAL at 08:18

## 2023-01-23 NOTE — PROGRESS NOTE ADULT - SUBJECTIVE AND OBJECTIVE BOX
RITIKA VALERO  77y Male    INTERVAL HPI/OVERNIGHT EVENTS:    spoke to pt via Cantonese  today  feels better than yesterday  less SOB  still with cough  no pain, N/V/D      T(F): 98.5 (01-23-23 @ 12:59), Max: 99 (01-22-23 @ 16:00)  HR: 77 (01-23-23 @ 12:59) (73 - 80)  BP: 152/74 (01-23-23 @ 12:59) (152/74 - 179/77)  RR: 18 (01-23-23 @ 12:59) (18 - 18)  SpO2: 99% (01-22-23 @ 21:25) (99% - 99%) on RA    I&O's Summary    23 Jan 2023 07:01  -  23 Jan 2023 14:03  --------------------------------------------------------  IN: 0 mL / OUT: 1 mL / NET: -1 mL      CAPILLARY BLOOD GLUCOSE      POCT Blood Glucose.: 106 mg/dL (23 Jan 2023 11:38)  POCT Blood Glucose.: 84 mg/dL (23 Jan 2023 07:58)  POCT Blood Glucose.: 108 mg/dL (22 Jan 2023 16:48)        PHYSICAL EXAM:  GENERAL: NAD  HEAD:  Normocephalic  EYES:  conjunctiva and sclera clear  ENMT: Moist mucous membranes  NECK: Supple  NERVOUS SYSTEM:  Alert, awake, Good concentration  CHEST/LUNG: decreased BS b/l  HEART: Regular rate and rhythm  ABDOMEN: Soft, Nontender, Nondistended; Bowel sounds present  EXTREMITIES: No edema  SKIN: warm, dry    Consultant(s) Notes Reviewed:  [x ] YES  [ ] NO  Care Discussed with Consultants/Other Providers [ x] YES  [ ] NO    MEDICATIONS  (STANDING):  chlorhexidine 2% Cloths 1 Application(s) Topical <User Schedule>  heparin   Injectable 5000 Unit(s) SubCutaneous every 12 hours  hydrALAZINE 25 milliGRAM(s) Oral three times a day  losartan 25 milliGRAM(s) Oral daily  magnesium oxide 400 milliGRAM(s) Oral three times a day with meals  pantoprazole    Tablet 40 milliGRAM(s) Oral before breakfast    MEDICATIONS  (PRN):  acetaminophen     Tablet .. 650 milliGRAM(s) Oral every 6 hours PRN Temp greater or equal to 38.5C (101.3F), Mild Pain (1 - 3)  guaifenesin/dextromethorphan Oral Liquid 10 milliLiter(s) Oral every 8 hours PRN Cough      Telemetry reviewed by me    LABS:                        9.9    3.81  )-----------( 90       ( 23 Jan 2023 06:57 )             27.8     01-23    129<L>  |  101  |  31<H>  ----------------------------<  85  3.9   |  23  |  1.5    Ca    8.0<L>      23 Jan 2023 06:57  Mg     2.0     01-23    TPro  5.4<L>  /  Alb  2.8<L>  /  TBili  0.3  /  DBili  x   /  AST  32  /  ALT  13  /  AlkPhos  67  01-23              RADIOLOGY & ADDITIONAL TESTS:    Imaging and report Personally Reviewed:  [ x] YES  [ ] NO      Case discussed with resident today    Care discussed with pt

## 2023-01-23 NOTE — PHYSICAL THERAPY INITIAL EVALUATION ADULT - GAIT TRAINING, PT EVAL
ambulate 50' with supervision and RW by DC, negotiate 6 steps CGA with step to pattern and 1 rail by DC

## 2023-01-23 NOTE — PHYSICAL THERAPY INITIAL EVALUATION ADULT - PERTINENT HX OF CURRENT PROBLEM, REHAB EVAL
77 year old cantonese speaking males with PMHx of CHF unknown ef, hypertension, hyperlipidemia, CAD, CKD3, CVA 8 years ago presenting for evaluation of lightheadedness, shortness of breath and vomiting for past 2 days.

## 2023-01-23 NOTE — PROGRESS NOTE ADULT - SUBJECTIVE AND OBJECTIVE BOX
Location: [address] [facility] [address] ([facility] [address] [facility])  Patient Name: RITIKA ESTEVEZ  Age: 77y  Gender: Male    Past Medical and Surgical History:  HTN (hypertension)    CAD (coronary artery disease)    Acute CHF    Cerebrovascular accident (CVA)    Chronic kidney disease, unspecified CKD stage    Social History:  Social History:  denies tobacco, etoh or illicit drug use (19 Jan 2023 15:56)    Allergies:  No Known Allergies    Patient is a 77y old Male who presents with a chief complaint of Hyponatremia (19 Jan 2023 14:20)    Primary diagnosis of HYPONATREMIA HEMORRHAGIC STROKE    Progress Note  This morning patient was seen and examined at bedside.    Today is hospital day 4d.  Mr. Estevez is doing fine. No overnight events  Reports having good night sleep. Appetite is adequate, and denies nausea or vomiting. Denies any abdominal pain, diarrhea, or constipation.   Ambulating and denies any shortness of breath, chest pain, palpitations, or light headedness.  Voiding freely and denies urinary symptoms (dysuria, urgency, frequency, intermittence).      Vital Signs in the last 24 hours   Vitals Summary T(C): 36.4 (01-23-23 @ 09:22), Max: 37.2 (01-22-23 @ 16:00)  HR: 73 (01-23-23 @ 09:22) (73 - 82)  BP: 161/75 (01-23-23 @ 09:22) (161/75 - 179/77)  RR: 18 (01-23-23 @ 09:22) (18 - 18)  SpO2: 99% (01-22-23 @ 21:25) (99% - 99%)  Vent Data   Intake/ Output       Physical Exam  * General Appearance: Alert, cooperative, interactive oriented to time, place, and person, in no acute distress  * Lungs: Respirations unlabored, Fair bilateral air entry, normal breath sounds  * Chest Wall: No tenderness or deformity  * Heart: Regular Rate and Rhythm, normal S1 and S2, no audible murmur, rub, or gallop  * Abdomen: Symmetric, soft, non-tender, bowel sounds active all four quadrants  * Extremities: Extremities normal, atraumatic, no cyanosis, no lower extremity pitting edema bilaterally    Investigations   Laboratory Workup  - CBC:                        9.9    3.81  )-----------( 90       ( 23 Jan 2023 06:57 )             27.8     - Chemistry:  01-23    129<L>  |  101  |  31<H>  ----------------------------<  85  3.9   |  23  |  1.5    Ca    8.0<L>      23 Jan 2023 06:57  Mg     2.0     01-23    TPro  5.4<L>  /  Alb  2.8<L>  /  TBili  0.3  /  DBili  x   /  AST  32  /  ALT  13  /  AlkPhos  67  01-23    Current Medications  Standing Medications  chlorhexidine 2% Cloths 1 Application(s) Topical <User Schedule>  heparin   Injectable 5000 Unit(s) SubCutaneous every 12 hours  hydrALAZINE 25 milliGRAM(s) Oral three times a day  losartan 25 milliGRAM(s) Oral daily  magnesium oxide 400 milliGRAM(s) Oral three times a day with meals  pantoprazole    Tablet 40 milliGRAM(s) Oral before breakfast    PRN Medications  acetaminophen     Tablet .. 650 milliGRAM(s) Oral every 6 hours PRN Temp greater or equal to 38.5C (101.3F), Mild Pain (1 - 3)  guaifenesin/dextromethorphan Oral Liquid 10 milliLiter(s) Oral every 8 hours PRN Cough    Singles Doses Administered  (ADM OVERRIDE) 1 each &lt;see task&gt; GiveOnce  (Floorstock) 1 each &lt;see task&gt; GiveOnce  (Floorstock) 1 each &lt;see task&gt; GiveOnce  (Floorstock) 2 each &lt;see task&gt; GiveOnce  remdesivir  IVPB   IV Intermittent   remdesivir  IVPB 200 milliGRAM(s) IV Intermittent every 24 hours  remdesivir  IVPB 100 milliGRAM(s) IV Intermittent every 24 hours  sodium chloride 0.9% Bolus 1000 milliLiter(s) IV Bolus once

## 2023-01-23 NOTE — PROGRESS NOTE ADULT - ASSESSMENT
Mr. Estevez 77 year old cantonese speaking males with PMHx of CHF unknown ef, hypertension, hyperlipidemia, CAD, CKD3, CVA 8 years ago presenting for evaluation of lightheadedness, shortness of breath and vomiting for past 2 days..    #Hyponatremia most likely hypovolemic in setting of COVID and poor po intake/emesis   - s/p NS hydration with appropriate correction  - monitor of NS  - f/u urine lytes  - trend BMP    #Thalamic hyperdensites r/o intracranial hemorrhage?  - Repeat CT head 6 hours laters: Redemonstration of a rounded hyperdensity within the left subinsular region potentially reflecting calcification, acute hemorrhage or possibly   an aneurysm. Age indeterminate right basal ganglia infarct right occipital and right cerebellar hemisphere.  - evaluated by Neurology and NS  - MR of brain and MRA of head without contrast ordered since pt with KOBE  - OK for DVT prophylaxis per NS attending    #covid infection  - not hypoxic  - completed course of remdesvir   - no need for abx and steroids  - ID recommendations appreciated   - isolation per hospital protocol    #pancytopenia 2/2 to multifactorial   - trend cbc   - keep type and screen     #kobe vs CKD  - check urine lytes  - avoid nephrotoxic agents   - s/p ns iv hydration with improvement    - CT abd/pelvis: no hydronephrosis    #CAD/CHF - unknown EF  - f/u ECHO  - avoid volume overload  - restart home meds once BP stabilizes as per neuro     #HTN - home meds reviewed: losartan, furosemide, hydralazine  hydralazine 25mg q8hr restarted and monitor BP  Restart Losartan and lasix gradually    DVT ppx: heparin SQ q12h  Dispo: Pending MRI pending Pt and placement

## 2023-01-23 NOTE — PHYSICAL THERAPY INITIAL EVALUATION ADULT - GENERAL OBSERVATIONS, REHAB EVAL
Pt encountered in semi-ellington position in bed in NAD, AxOx4, no c/o pain, and agreeable with PT. Pt speaks Cantonese, daughter and son in law at b/s. Pt performed bed mobility CGA, transfers CGA, and ambulated 25 feet, CGA, with RW, dec step length, dec hailee, limited 2* to LE weakness and diarrhea. Pt will benefit from skilled PT to inc activity. Pt left in in bathroom with daughter in NAD, with call bell and alarm in reach.

## 2023-01-23 NOTE — PROGRESS NOTE ADULT - ASSESSMENT
78 y/o Cantonese speaking man with PMH of CHF, hypertension, hyperlipidemia, CAD, CKD3 and CVA 8 years ago presented for evaluation of lightheadedness, shortness of breath and vomiting for past 2 days. Workup showed hyponatremia, abnormal CT scan of head, COVID positive and KOBE.     1. Hyponatremia most likely hypovolemic in setting of COVID and poor po intake with N/V  - s/p NS hydration with improvement  - pt now eating and drinking  - low serum osmolality  - check urine osmolality - reordered  - avoid excessive free water intake  - monitor BMP    2. Hyperdensity on CT scan of head - rule out hemorrhage vs calcifications  - Repeat CT head 6 hours laters: Redemonstration of a rounded hyperdensity within the left subinsular region potentially reflecting calcification, acute hemorrhage or possibly an aneurysm. Age indeterminate right basal ganglia infarct right occipital and right cerebellar hemisphere.  - evaluated by Neurology and NS  - MR of brain and MRA of head without contrast ordered since pt had KOBE  - OK for DVT prophylaxis per NS attending - now ordered    3. COVID positive  - pt had dyspnea and cough  - remains stable on RA  - completed course of remdesvir   - no need for abx and steroids  - evaluated by ID  - isolation per hospital protocol    3. Pancytopenia - unknown baseline  - relatively stable  - keep type and screen   - can have outpt workup if remains stable    4. KOBE over CKD 3 - improved now after hydration  - monitor BMP  - avoid nephrotoxic agents   - CT abd/pelvis: no hydronephrosis    5. CHF with unknown EF  - f/u ECHO  - avoid volume overload    6. HTN - home meds reviewed: losartan, furosemide, hydralazine  hydralazine 25mg q8hr restarted and now losartan restarted and monitor BP      full code      PROGRESS NOTE HANDOFF    Pending: MRI and MRA of head, labs in AM    pt informed of the plan of care    Disposition: home

## 2023-01-24 ENCOUNTER — TRANSCRIPTION ENCOUNTER (OUTPATIENT)
Age: 78
End: 2023-01-24

## 2023-01-24 LAB
ALBUMIN SERPL ELPH-MCNC: 2.9 G/DL — LOW (ref 3.5–5.2)
ALP SERPL-CCNC: 70 U/L — SIGNIFICANT CHANGE UP (ref 30–115)
ALT FLD-CCNC: 14 U/L — SIGNIFICANT CHANGE UP (ref 0–41)
ANION GAP SERPL CALC-SCNC: 8 MMOL/L — SIGNIFICANT CHANGE UP (ref 7–14)
AST SERPL-CCNC: 27 U/L — SIGNIFICANT CHANGE UP (ref 0–41)
BILIRUB SERPL-MCNC: 0.5 MG/DL — SIGNIFICANT CHANGE UP (ref 0.2–1.2)
BUN SERPL-MCNC: 32 MG/DL — HIGH (ref 10–20)
CALCIUM SERPL-MCNC: 8.2 MG/DL — LOW (ref 8.4–10.4)
CHLORIDE SERPL-SCNC: 100 MMOL/L — SIGNIFICANT CHANGE UP (ref 98–110)
CO2 SERPL-SCNC: 24 MMOL/L — SIGNIFICANT CHANGE UP (ref 17–32)
CREAT SERPL-MCNC: 1.5 MG/DL — SIGNIFICANT CHANGE UP (ref 0.7–1.5)
EGFR: 48 ML/MIN/1.73M2 — LOW
GLUCOSE BLDC GLUCOMTR-MCNC: 100 MG/DL — HIGH (ref 70–99)
GLUCOSE BLDC GLUCOMTR-MCNC: 140 MG/DL — HIGH (ref 70–99)
GLUCOSE SERPL-MCNC: 85 MG/DL — SIGNIFICANT CHANGE UP (ref 70–99)
HCT VFR BLD CALC: 29.6 % — LOW (ref 42–52)
HGB BLD-MCNC: 10.3 G/DL — LOW (ref 14–18)
MAGNESIUM SERPL-MCNC: 2.1 MG/DL — SIGNIFICANT CHANGE UP (ref 1.8–2.4)
MCHC RBC-ENTMCNC: 31.1 PG — HIGH (ref 27–31)
MCHC RBC-ENTMCNC: 34.8 G/DL — SIGNIFICANT CHANGE UP (ref 32–37)
MCV RBC AUTO: 89.4 FL — SIGNIFICANT CHANGE UP (ref 80–94)
NRBC # BLD: 0 /100 WBCS — SIGNIFICANT CHANGE UP (ref 0–0)
PLATELET # BLD AUTO: 99 K/UL — LOW (ref 130–400)
POTASSIUM SERPL-MCNC: 4 MMOL/L — SIGNIFICANT CHANGE UP (ref 3.5–5)
POTASSIUM SERPL-SCNC: 4 MMOL/L — SIGNIFICANT CHANGE UP (ref 3.5–5)
PROT SERPL-MCNC: 5.7 G/DL — LOW (ref 6–8)
RBC # BLD: 3.31 M/UL — LOW (ref 4.7–6.1)
RBC # FLD: 13.2 % — SIGNIFICANT CHANGE UP (ref 11.5–14.5)
SODIUM SERPL-SCNC: 132 MMOL/L — LOW (ref 135–146)
WBC # BLD: 3.94 K/UL — LOW (ref 4.8–10.8)
WBC # FLD AUTO: 3.94 K/UL — LOW (ref 4.8–10.8)

## 2023-01-24 PROCEDURE — 99239 HOSP IP/OBS DSCHRG MGMT >30: CPT

## 2023-01-24 RX ORDER — SODIUM CHLORIDE 9 MG/ML
1000 INJECTION INTRAMUSCULAR; INTRAVENOUS; SUBCUTANEOUS
Refills: 0 | Status: DISCONTINUED | OUTPATIENT
Start: 2023-01-24 | End: 2023-01-26

## 2023-01-24 RX ADMIN — HEPARIN SODIUM 5000 UNIT(S): 5000 INJECTION INTRAVENOUS; SUBCUTANEOUS at 05:01

## 2023-01-24 RX ADMIN — Medication 25 MILLIGRAM(S): at 05:00

## 2023-01-24 RX ADMIN — HEPARIN SODIUM 5000 UNIT(S): 5000 INJECTION INTRAVENOUS; SUBCUTANEOUS at 17:06

## 2023-01-24 RX ADMIN — Medication 20 MILLIGRAM(S): at 05:01

## 2023-01-24 RX ADMIN — CHLORHEXIDINE GLUCONATE 1 APPLICATION(S): 213 SOLUTION TOPICAL at 05:01

## 2023-01-24 RX ADMIN — LOSARTAN POTASSIUM 25 MILLIGRAM(S): 100 TABLET, FILM COATED ORAL at 05:01

## 2023-01-24 RX ADMIN — Medication 25 MILLIGRAM(S): at 16:32

## 2023-01-24 RX ADMIN — MAGNESIUM OXIDE 400 MG ORAL TABLET 400 MILLIGRAM(S): 241.3 TABLET ORAL at 17:06

## 2023-01-24 RX ADMIN — MAGNESIUM OXIDE 400 MG ORAL TABLET 400 MILLIGRAM(S): 241.3 TABLET ORAL at 08:22

## 2023-01-24 RX ADMIN — PANTOPRAZOLE SODIUM 40 MILLIGRAM(S): 20 TABLET, DELAYED RELEASE ORAL at 05:00

## 2023-01-24 RX ADMIN — Medication 25 MILLIGRAM(S): at 21:50

## 2023-01-24 RX ADMIN — MAGNESIUM OXIDE 400 MG ORAL TABLET 400 MILLIGRAM(S): 241.3 TABLET ORAL at 11:59

## 2023-01-24 RX ADMIN — SODIUM CHLORIDE 60 MILLILITER(S): 9 INJECTION INTRAMUSCULAR; INTRAVENOUS; SUBCUTANEOUS at 11:59

## 2023-01-24 NOTE — OCCUPATIONAL THERAPY INITIAL EVALUATION ADULT - DIAGNOSIS, OT EVAL
Debility 2*  Hyponatremia in setting of COVID, Hyperdensity on CT scan of head - rule out hemorrhage vs calcifications

## 2023-01-24 NOTE — OCCUPATIONAL THERAPY INITIAL EVALUATION ADULT - BED MOBILITY/TRANSFERS, PREVIOUS LEVEL OF FUNCTION, OT EVAL
SC, independent with transfers except endorses difficulty stepping over tub ledge at baseline/independent/needed assist/needs device

## 2023-01-24 NOTE — OCCUPATIONAL THERAPY INITIAL EVALUATION ADULT - TRANSFER TRAINING, PT EVAL
Pt will increase toilet transfer to supervision by discharge to increase safety and independence with functional transfers

## 2023-01-24 NOTE — DISCHARGE NOTE PROVIDER - NSDCMRMEDTOKEN_GEN_ALL_CORE_FT
furosemide 20 mg oral tablet: 1 tab(s) orally once a day  hydrALAZINE 25 mg oral tablet: 1 tab(s) orally 3 times a day  losartan 25 mg oral tablet: 1 tab(s) orally once a day  pantoprazole 40 mg oral delayed release tablet: 1 tab(s) orally once a day  Verquvo 2.5 mg oral tablet: 1 tab(s) orally once a day  Vitamin B12 500 mcg oral tablet:    aspirin 81 mg oral tablet, chewable: 1 tab(s) orally once a day  atorvastatin 80 mg oral tablet: 1 tab(s) orally once a day (at bedtime)  furosemide 20 mg oral tablet: 1 tab(s) orally once a day  hydrALAZINE 25 mg oral tablet: 1 tab(s) orally 3 times a day  losartan 25 mg oral tablet: 1 tab(s) orally once a day  pantoprazole 40 mg oral delayed release tablet: 1 tab(s) orally once a day  Verquvo 2.5 mg oral tablet: 1 tab(s) orally once a day  Vitamin B12 500 mcg oral tablet:

## 2023-01-24 NOTE — DISCHARGE NOTE PROVIDER - NSDCCPCAREPLAN_GEN_ALL_CORE_FT
PRINCIPAL DISCHARGE DIAGNOSIS  Diagnosis: 2019 novel coronavirus disease (COVID-19)  Assessment and Plan of Treatment: You had a COVID-19 infection that was complicated by acute kidney injury and low blood sodium (hyponatremia) that both contributed in the severe fatigue that you had on admission.   Take your medications as prescribed and follow up with your primary care provider as soon as possible        SECONDARY DISCHARGE DIAGNOSES  Diagnosis: Hyponatremia  Assessment and Plan of Treatment:      PRINCIPAL DISCHARGE DIAGNOSIS  Diagnosis: 2019 novel coronavirus disease (COVID-19)  Assessment and Plan of Treatment: You had a COVID-19 infection that was complicated by acute kidney injury and low blood sodium (hyponatremia) that both contributed in the severe fatigue that you had on admission.   Take your medications as prescribed and follow up with your primary care and neurologist provider as soon as possible        SECONDARY DISCHARGE DIAGNOSES  Diagnosis: Hyponatremia  Assessment and Plan of Treatment:

## 2023-01-24 NOTE — OCCUPATIONAL THERAPY INITIAL EVALUATION ADULT - PERTINENT HX OF CURRENT PROBLEM, REHAB EVAL
Pt is a right hand dominant Cantonese speaking male admitted 1/19 with PMH of CHF, hypertension, hyperlipidemia, CAD, CKD3 and CVA 8 years ago presented for evaluation of lightheadedness, shortness of breath and vomiting for past 2 days. Workup showed hyponatremia, abnormal CT scan of head, COVID positive and KOBE. MRI head pending to further investigate r/o hemorrhage vs calcification vs aneurysm

## 2023-01-24 NOTE — DISCHARGE NOTE PROVIDER - ATTENDING DISCHARGE PHYSICAL EXAMINATION:
GENERAL: NAD  EYES:  conjunctiva and sclera clear  ENT: Moist mucous membranes  NERVOUS SYSTEM:  AOx3  CHEST/LUNG: symmetrical chest rise, no accessory muscle use  HEART: Regular rate and rhythm  ABDOMEN: Soft, Nontender, Nondistended; Bowel sounds present  EXTREMITIES: No edema  SKIN: warm, dry

## 2023-01-24 NOTE — DISCHARGE NOTE PROVIDER - CARE PROVIDERS DIRECT ADDRESSES
[email or web address]

## 2023-01-24 NOTE — PROGRESS NOTE ADULT - SUBJECTIVE AND OBJECTIVE BOX
Location: [address] ([facility] F14A [facility])  Patient Name: RITIKA VALERO  Age: 77y  Gender: Male    Past Medical and Surgical History:  HTN (hypertension)    CAD (coronary artery disease)    Acute CHF    Cerebrovascular accident (CVA)    Chronic kidney disease, unspecified CKD stage        Social History:  Social History:  denies tobacco, etoh or illicit drug use (19 Jan 2023 15:56)      Allergies:  No Known Allergies      Patient is a 77y old Male who presents with a chief complaint of Hyponatremia (19 Jan 2023 14:20)    Primary diagnosis of HYPONATREMIA HEMORRHAGIC STROKE        Progress Note  This morning patient was seen and examined at bedside.    Today is hospital day 5d.  Mr. XXX is doing fine.   Reports having good night sleep. XXX (chief complaint) has improved. Appetite is adequate, and denies nausea or vomiting. Denies any abdominal pain, diarrhea, or constipation.   Last bowel movement was soft and was on   Ambulating and denies any shortness of breath, chest pain, palpitations, or light headedness.  Voiding freely/ via landa catheter and denies urinary symptoms (dysuria, urgency, frequency, intermittence).      Vital Signs in the last 24 hours   Vitals Summary T(C): 36 (01-24-23 @ 05:10), Max: 37.2 (01-23-23 @ 19:21)  HR: 73 (01-24-23 @ 05:10) (73 - 86)  BP: 152/68 (01-24-23 @ 05:10) (152/68 - 167/79)  RR: 19 (01-24-23 @ 05:10) (18 - 19)  SpO2: --  Vent Data   Intake/ Output   01-23-23 @ 07:01  -  01-24-23 @ 07:00  --------------------------------------------------------  IN: 480 mL / OUT: 2 mL / NET: 478 mL          Physical Exam  * General Appearance: Alert, cooperative, interactive, well-groomed, oriented to time, place, and person, in no acute distress  * Head: Normocephalic, without obvious abnormality, atraumatic  * Throat: Lips, mucosa, and tongue normal  * Neck: Supple, symmetrical, trachea midline, no adenopathy;   * Back: Symmetric, no curvature, ROM normal, no CVA tenderness  * Lungs: Respirations unlabored, Good bilateral air entry, normal breath sounds (Clear to auscultation bilaterally, no audible wheezes, crackles, or rhonchi)  * Chest Wall: No tenderness or deformity  * Heart: Regular Rate and Rhythm, normal S1 and S2, no audible murmur, rub, or gallop  * Abdomen: Symmetric, soft, non-tender, bowel sounds active all four quadrants  * Extremities: Extremities normal, atraumatic, no cyanosis, no lower extremity pitting edema bilaterally, adequate dorsalis pedis pulses      Investigations   Laboratory Workup  - CBC:                        10.3   3.94  )-----------( 99       ( 24 Jan 2023 07:35 )             29.6     - Chemistry:  01-24    132<L>  |  100  |  32<H>  ----------------------------<  85  4.0   |  24  |  1.5    Ca    8.2<L>      24 Jan 2023 07:35  Mg     2.1     01-24    TPro  5.7<L>  /  Alb  2.9<L>  /  TBili  0.5  /  DBili  x   /  AST  27  /  ALT  14  /  AlkPhos  70  01-24    - Coagulation Studies:    - ABG:    - Cardiac Markers:        Microbiological Workup        Radiological Workup  *  Current Medications  Standing Medications  chlorhexidine 2% Cloths 1 Application(s) Topical <User Schedule>  furosemide    Tablet 20 milliGRAM(s) Oral daily  heparin   Injectable 5000 Unit(s) SubCutaneous every 12 hours  hydrALAZINE 25 milliGRAM(s) Oral three times a day  losartan 25 milliGRAM(s) Oral daily  magnesium oxide 400 milliGRAM(s) Oral three times a day with meals  pantoprazole    Tablet 40 milliGRAM(s) Oral before breakfast  sodium chloride 0.9%. 1000 milliLiter(s) (60 mL/Hr) IV Continuous <Continuous>    PRN Medications  acetaminophen     Tablet .. 650 milliGRAM(s) Oral every 6 hours PRN Temp greater or equal to 38.5C (101.3F), Mild Pain (1 - 3)  guaifenesin/dextromethorphan Oral Liquid 10 milliLiter(s) Oral every 8 hours PRN Cough    Singles Doses Administered  (ADM OVERRIDE) 1 each &lt;see task&gt; GiveOnce  (Floorstock) 1 each &lt;see task&gt; GiveOnce  (Floorstock) 1 each &lt;see task&gt; GiveOnce  (Floorstock) 2 each &lt;see task&gt; GiveOnce  remdesivir  IVPB   IV Intermittent   remdesivir  IVPB 200 milliGRAM(s) IV Intermittent every 24 hours  remdesivir  IVPB 100 milliGRAM(s) IV Intermittent every 24 hours  sodium chloride 0.9% Bolus 1000 milliLiter(s) IV Bolus once       Location: [address] [facility] [address] ([facility])  Patient Name: RITIKA ESTEVEZ  Age: 77y  Gender: Male    Past Medical and Surgical History:  HTN (hypertension)    CAD (coronary artery disease)    Acute CHF    Cerebrovascular accident (CVA)    Chronic kidney disease, unspecified CKD stage        Social History:  Social History:  denies tobacco, etoh or illicit drug use (19 Jan 2023 15:56)      Allergies:  No Known Allergies      Patient is a 77y old Male who presents with a chief complaint of Hyponatremia (19 Jan 2023 14:20)    Primary diagnosis of HYPONATREMIA HEMORRHAGIC STROKE        Progress Note  This morning patient was seen and examined at bedside.    Today is hospital day 5d.  Mr. Estevez is doing fine. No overnight events  Reports having good night sleep. Appetite is adequate, and denies nausea or vomiting. Denies any abdominal pain, diarrhea, or constipation.   Ambulating and denies any shortness of breath, chest pain, palpitations, or light headedness.  Voiding freely and denies urinary symptoms (dysuria, urgency, frequency, intermittence).      Vital Signs in the last 24 hours   Vitals Summary T(C): 36 (01-24-23 @ 05:10), Max: 37.2 (01-23-23 @ 19:21)  HR: 73 (01-24-23 @ 05:10) (73 - 86)  BP: 152/68 (01-24-23 @ 05:10) (152/68 - 167/79)  RR: 19 (01-24-23 @ 05:10) (18 - 19)  SpO2: --  Vent Data   Intake/ Output   01-23-23 @ 07:01  -  01-24-23 @ 07:00  --------------------------------------------------------  IN: 480 mL / OUT: 2 mL / NET: 478 mL        Physical Exam  * General Appearance: Alert, cooperative, interactive oriented to time, place, and person, in no acute distress  * Lungs: Respirations unlabored, Fair bilateral air entry, normal breath sounds  * Chest Wall: No tenderness or deformity  * Heart: Regular Rate and Rhythm, normal S1 and S2, no audible murmur, rub, or gallop  * Abdomen: Symmetric, soft, non-tender, bowel sounds active all four quadrants  * Extremities: Extremities normal, atraumatic, no cyanosis, no lower extremity pitting edema bilaterally        Investigations   Laboratory Workup  - CBC:                        10.3   3.94  )-----------( 99       ( 24 Jan 2023 07:35 )             29.6     - Chemistry:  01-24    132<L>  |  100  |  32<H>  ----------------------------<  85  4.0   |  24  |  1.5    Ca    8.2<L>      24 Jan 2023 07:35  Mg     2.1     01-24    TPro  5.7<L>  /  Alb  2.9<L>  /  TBili  0.5  /  DBili  x   /  AST  27  /  ALT  14  /  AlkPhos  70  01-24    - Coagulation Studies:    - ABG:    - Cardiac Markers:        Microbiological Workup        Radiological Workup  *  Current Medications  Standing Medications  chlorhexidine 2% Cloths 1 Application(s) Topical <User Schedule>  furosemide    Tablet 20 milliGRAM(s) Oral daily  heparin   Injectable 5000 Unit(s) SubCutaneous every 12 hours  hydrALAZINE 25 milliGRAM(s) Oral three times a day  losartan 25 milliGRAM(s) Oral daily  magnesium oxide 400 milliGRAM(s) Oral three times a day with meals  pantoprazole    Tablet 40 milliGRAM(s) Oral before breakfast  sodium chloride 0.9%. 1000 milliLiter(s) (60 mL/Hr) IV Continuous <Continuous>    PRN Medications  acetaminophen     Tablet .. 650 milliGRAM(s) Oral every 6 hours PRN Temp greater or equal to 38.5C (101.3F), Mild Pain (1 - 3)  guaifenesin/dextromethorphan Oral Liquid 10 milliLiter(s) Oral every 8 hours PRN Cough    Singles Doses Administered  (ADM OVERRIDE) 1 each &lt;see task&gt; GiveOnce  (Floorstock) 1 each &lt;see task&gt; GiveOnce  (Floorstock) 1 each &lt;see task&gt; GiveOnce  (Floorstock) 2 each &lt;see task&gt; GiveOnce  remdesivir  IVPB   IV Intermittent   remdesivir  IVPB 200 milliGRAM(s) IV Intermittent every 24 hours  remdesivir  IVPB 100 milliGRAM(s) IV Intermittent every 24 hours  sodium chloride 0.9% Bolus 1000 milliLiter(s) IV Bolus once

## 2023-01-24 NOTE — DISCHARGE NOTE PROVIDER - PROVIDER TOKENS
PROVIDER:[TOKEN:[[id number]],FOLLOWUP:[2 weeks]] PROVIDER:[TOKEN:[[id number]],FOLLOWUP:[2 weeks]],PROVIDER:[TOKEN:[[id number]],FOLLOWUP:[2 weeks]]

## 2023-01-24 NOTE — PROGRESS NOTE ADULT - ASSESSMENT
Mr. Estevez 77 year old cantonese speaking males with PMHx of CHF unknown ef, hypertension, hyperlipidemia, CAD, CKD3, CVA 8 years ago presenting for evaluation of lightheadedness, shortness of breath and vomiting for past 2 days..    #Hyponatremia most likely hypovolemic in setting of COVID and poor po intake/emesis   - s/p NS hydration with appropriate correction  - monitor of NS  - f/u urine lytes  - trend BMP    #Thalamic hyperdensites r/o intracranial hemorrhage?  - Repeat CT head 6 hours laters: Redemonstration of a rounded hyperdensity within the left subinsular region potentially reflecting calcification, acute hemorrhage or possibly   an aneurysm. Age indeterminate right basal ganglia infarct right occipital and right cerebellar hemisphere.  - evaluated by Neurology and NS  - MR of brain and MRA of head without contrast ordered since pt with KOBE  - OK for DVT prophylaxis per NS attending    #covid infection  - not hypoxic  - completed course of remdesvir   - no need for abx and steroids  - ID recommendations appreciated   - isolation per hospital protocol    #pancytopenia 2/2 to multifactorial   - trend cbc   - keep type and screen     #kobe vs CKD  - check urine lytes  - avoid nephrotoxic agents   - s/p ns iv hydration with improvement    - CT abd/pelvis: no hydronephrosis    #CAD/CHF - unknown EF  - f/u ECHO  - avoid volume overload  - restart home meds once BP stabilizes as per neuro     #HTN - home meds reviewed: losartan, furosemide, hydralazine  hydralazine 25mg q8hr restarted and monitor BP  Restart Losartan and lasix gradually    DVT ppx: heparin SQ q12h  Dispo: Pending MRI pending Pt and placement Mr. Estevez 77 year old cantonese speaking males with PMHx of CHF unknown ef, hypertension, hyperlipidemia, CAD, CKD3, CVA 8 years ago presenting for evaluation of lightheadedness, shortness of breath and vomiting for past 2 days..    #Hyponatremia most likely hypovolemic in setting of COVID and poor po intake/emesis   - s/p NS hydration with appropriate correction  - monitor of NS  - f/u urine lytes  - trend BMP    #Thalamic hyperdensites r/o intracranial hemorrhage?  - Repeat CT head 6 hours laters: Redemonstration of a rounded hyperdensity within the left subinsular region potentially reflecting calcification, acute hemorrhage or possibly   an aneurysm. Age indeterminate right basal ganglia infarct right occipital and right cerebellar hemisphere.  - evaluated by Neurology and NS  - MR of brain and MRA of head without contrast consistent with possible cavernoma vs AV malformation 1/24  - CTA is recommended ; Will start patient on IVF to minimise risk of DOUGLAS  - OK for DVT prophylaxis per NS attending    #covid infection  - not hypoxic  - completed course of remdesvir   - no need for abx and steroids  - ID recommendations appreciated   - isolation per hospital protocol    #pancytopenia 2/2 to multifactorial   - trend cbc   - keep type and screen     #emma vs CKD  - check urine lytes  - avoid nephrotoxic agents   - s/p ns iv hydration with improvement    - CT abd/pelvis: no hydronephrosis    #CAD/CHF - unknown EF  - f/u ECHO  - avoid volume overload  - restart home meds once BP stabilizes as per neuro     #HTN - home meds reviewed: losartan, furosemide, hydralazine  hydralazine 25mg q8hr restarted and monitor BP  Restart Losartan and lasix gradually    DVT ppx: heparin SQ q12h  Dispo: Pending CTA and will go home with PT

## 2023-01-24 NOTE — DISCHARGE NOTE PROVIDER - NSDCQMSTROKE_NEU_ALL_CORE
Please tell the patient that her nuclear stress test is normal   Has she an appt with Dr Ganser yet?   No

## 2023-01-24 NOTE — DISCHARGE NOTE PROVIDER - CARE PROVIDER_API CALL
Karly Aguilar)  Neurosurgery  [address], [address]  [place] [address]  Phone: [phone]  Fax: [phone]  Follow Up Time: 2 weeks   Karly Aguilar)  Neurosurgery  [address] [facility], [address]  [place] [address]  Phone: [phone]  Fax: [phone]  Follow Up Time: 2 weeks    Fransisco Tinoco)  Neurology  [address] [place] [address]  Phone: [phone]  Fax: [phone]  Follow Up Time: 2 weeks

## 2023-01-24 NOTE — OCCUPATIONAL THERAPY INITIAL EVALUATION ADULT - PROPRIOCEPTION, RUE, OT EVAL
Naveed is a pleasant 77-year-old  who [facility]. His brother was diagnosed in his late 60s with rectal cancer. Recent colonoscopy noted multiple adenomas. He takes stool softeners, metamucil and senna and despite that has significant constipation. Previously took miralax with no improvement. WE gave hi samples of linzess last OV which was too expensive. He is taking bisacodyl daily with a BM daily.  He denies any abdominal pain. Denies any rectal bleeding or melena.    Hx fatty liver , US enlarged liver, Fibroscan S3/F0. Neg viral hepatitis panel. Normal iron stores. Rare etoh use (1-2 per MONTH).    He takes omeprazole for his acid reflux and is currently asymptomatic on omeprazole. Denies any dysphagia.   reports history of dysphagia s/pdilation 12 years ago (no records). He has improved his diet and lost a few pounds. Denies use of NSAIDs. within normal limits

## 2023-01-24 NOTE — OCCUPATIONAL THERAPY INITIAL EVALUATION ADULT - GENERAL OBSERVATIONS, REHAB EVAL
Pt encountered and left semi reclined in bed. NAD. +sob with exersion, relief with rest and breathing strategies. RN made aware states she will obtain pulse ox. +IV locked.Utilized Cantonese  #[id number] throughout assessment. Pt cautioned not to transfer Independently. Call bell in reach.

## 2023-01-25 LAB
ALBUMIN SERPL ELPH-MCNC: 2.9 G/DL — LOW (ref 3.5–5.2)
ALP SERPL-CCNC: 68 U/L — SIGNIFICANT CHANGE UP (ref 30–115)
ALT FLD-CCNC: 11 U/L — SIGNIFICANT CHANGE UP (ref 0–41)
ANION GAP SERPL CALC-SCNC: 10 MMOL/L — SIGNIFICANT CHANGE UP (ref 7–14)
AST SERPL-CCNC: 21 U/L — SIGNIFICANT CHANGE UP (ref 0–41)
BILIRUB SERPL-MCNC: 0.6 MG/DL — SIGNIFICANT CHANGE UP (ref 0.2–1.2)
BUN SERPL-MCNC: 29 MG/DL — HIGH (ref 10–20)
CALCIUM SERPL-MCNC: 8.1 MG/DL — LOW (ref 8.4–10.5)
CHLORIDE SERPL-SCNC: 103 MMOL/L — SIGNIFICANT CHANGE UP (ref 98–110)
CO2 SERPL-SCNC: 22 MMOL/L — SIGNIFICANT CHANGE UP (ref 17–32)
CREAT SERPL-MCNC: 1.5 MG/DL — SIGNIFICANT CHANGE UP (ref 0.7–1.5)
CULTURE RESULTS: SIGNIFICANT CHANGE UP
CULTURE RESULTS: SIGNIFICANT CHANGE UP
EGFR: 48 ML/MIN/1.73M2 — LOW
GLUCOSE BLDC GLUCOMTR-MCNC: 102 MG/DL — HIGH (ref 70–99)
GLUCOSE BLDC GLUCOMTR-MCNC: 107 MG/DL — HIGH (ref 70–99)
GLUCOSE BLDC GLUCOMTR-MCNC: 86 MG/DL — SIGNIFICANT CHANGE UP (ref 70–99)
GLUCOSE BLDC GLUCOMTR-MCNC: 97 MG/DL — SIGNIFICANT CHANGE UP (ref 70–99)
GLUCOSE SERPL-MCNC: 77 MG/DL — SIGNIFICANT CHANGE UP (ref 70–99)
HCT VFR BLD CALC: 28.1 % — LOW (ref 42–52)
HGB BLD-MCNC: 9.8 G/DL — LOW (ref 14–18)
MAGNESIUM SERPL-MCNC: 2 MG/DL — SIGNIFICANT CHANGE UP (ref 1.8–2.4)
MCHC RBC-ENTMCNC: 30.7 PG — SIGNIFICANT CHANGE UP (ref 27–31)
MCHC RBC-ENTMCNC: 34.9 G/DL — SIGNIFICANT CHANGE UP (ref 32–37)
MCV RBC AUTO: 88.1 FL — SIGNIFICANT CHANGE UP (ref 80–94)
NRBC # BLD: 0 /100 WBCS — SIGNIFICANT CHANGE UP (ref 0–0)
PLATELET # BLD AUTO: 101 K/UL — LOW (ref 130–400)
POTASSIUM SERPL-MCNC: 3.8 MMOL/L — SIGNIFICANT CHANGE UP (ref 3.5–5)
POTASSIUM SERPL-SCNC: 3.8 MMOL/L — SIGNIFICANT CHANGE UP (ref 3.5–5)
PROT SERPL-MCNC: 5.3 G/DL — LOW (ref 6–8)
RBC # BLD: 3.19 M/UL — LOW (ref 4.7–6.1)
RBC # FLD: 13.2 % — SIGNIFICANT CHANGE UP (ref 11.5–14.5)
SODIUM SERPL-SCNC: 135 MMOL/L — SIGNIFICANT CHANGE UP (ref 135–146)
SPECIMEN SOURCE: SIGNIFICANT CHANGE UP
SPECIMEN SOURCE: SIGNIFICANT CHANGE UP
WBC # BLD: 3.6 K/UL — LOW (ref 4.8–10.8)
WBC # FLD AUTO: 3.6 K/UL — LOW (ref 4.8–10.8)

## 2023-01-25 PROCEDURE — 99232 SBSQ HOSP IP/OBS MODERATE 35: CPT

## 2023-01-25 PROCEDURE — 70496 CT ANGIOGRAPHY HEAD: CPT | Mod: 26

## 2023-01-25 RX ADMIN — Medication 25 MILLIGRAM(S): at 06:12

## 2023-01-25 RX ADMIN — Medication 20 MILLIGRAM(S): at 06:30

## 2023-01-25 RX ADMIN — MAGNESIUM OXIDE 400 MG ORAL TABLET 400 MILLIGRAM(S): 241.3 TABLET ORAL at 07:59

## 2023-01-25 RX ADMIN — CHLORHEXIDINE GLUCONATE 1 APPLICATION(S): 213 SOLUTION TOPICAL at 06:29

## 2023-01-25 RX ADMIN — HEPARIN SODIUM 5000 UNIT(S): 5000 INJECTION INTRAVENOUS; SUBCUTANEOUS at 06:12

## 2023-01-25 RX ADMIN — PANTOPRAZOLE SODIUM 40 MILLIGRAM(S): 20 TABLET, DELAYED RELEASE ORAL at 06:12

## 2023-01-25 RX ADMIN — Medication 25 MILLIGRAM(S): at 15:17

## 2023-01-25 RX ADMIN — HEPARIN SODIUM 5000 UNIT(S): 5000 INJECTION INTRAVENOUS; SUBCUTANEOUS at 17:11

## 2023-01-25 RX ADMIN — MAGNESIUM OXIDE 400 MG ORAL TABLET 400 MILLIGRAM(S): 241.3 TABLET ORAL at 11:56

## 2023-01-25 RX ADMIN — MAGNESIUM OXIDE 400 MG ORAL TABLET 400 MILLIGRAM(S): 241.3 TABLET ORAL at 17:11

## 2023-01-25 RX ADMIN — SODIUM CHLORIDE 60 MILLILITER(S): 9 INJECTION INTRAMUSCULAR; INTRAVENOUS; SUBCUTANEOUS at 22:06

## 2023-01-25 RX ADMIN — Medication 25 MILLIGRAM(S): at 22:02

## 2023-01-25 RX ADMIN — SODIUM CHLORIDE 60 MILLILITER(S): 9 INJECTION INTRAMUSCULAR; INTRAVENOUS; SUBCUTANEOUS at 06:11

## 2023-01-25 NOTE — PROGRESS NOTE ADULT - ATTENDING COMMENTS
Mr. Estevez 77 year old cantonese speaking male with PMHx of CHF unknown ef, hypertension, hyperlipidemia, CAD, CKD3, CVA 8 years ago presenting for evaluation of lightheadedness, shortness of breath and vomiting for past 2 days.    #Hyponatremia most likely hypovolemic in setting of COVID and poor po intake/emesis   - s/p NS hydration with appropriate correction    #Thalamic hyperdensites r/o intracranial hemorrhage?  - Repeat CT head 6 hours laters: Redemonstration of a rounded hyperdensity within the left subinsular region potentially reflecting calcification, acute hemorrhage or possibly an aneurysm. Age indeterminate right basal ganglia infarct right occipital and right cerebellar hemisphere.  - evaluated by Neurology and NS  - MR of brain and MRA of head without contrast consistent with possible cavernoma vs AV malformation 1/24  - CTA- no evidence of AVM or aneurysm   - OK for DVT prophylaxis per NS attending    #COVID  - completed course of remdesvir   - no need for abx and steroids    #Pancytopenia  - etiology unclear, ? covid   - monitor closely     #KOBE- improved s/p IVF    - CT abd/pelvis: no hydronephrosis    #CAD/CHF - unknown EF  - f/u ECHO, this can be done OP if needed     #HTN - home meds : losartan, furosemide, hydralazine  hydralazine 25mg q8hr restarted   Restart Losartan and lasix gradually    DVT ppx: heparin SQ q12h  Dispo: ant dc 24 hours to home     Total time spent to complete patient's bedside assessment, review medical chart, discuss medical plan of care with covering medical team was more than 35 minutes  with >50% of time spent face to face with patient, discussion with patient/family and/or coordination of care      Cleo Rizzo DO

## 2023-01-25 NOTE — PROGRESS NOTE ADULT - SUBJECTIVE AND OBJECTIVE BOX
Location: [address] [facility] 009 B ([facility] F14A [facility])  Patient Name: RITIKA ESTEVEZ  Age: 77y  Gender: Male    Past Medical and Surgical History:  HTN (hypertension)    CAD (coronary artery disease)    Acute CHF    Cerebrovascular accident (CVA)    Chronic kidney disease, unspecified CKD stage        Social History:  Social History:  denies tobacco, etoh or illicit drug use (19 Jan 2023 15:56)      Allergies:  No Known Allergies      Patient is a 77y old Male who presents with a chief complaint of Hyponatremia (19 Jan 2023 14:20)    Primary diagnosis of HYPONATREMIA HEMORRHAGIC STROKE        Progress Note  This morning patient was seen and examined at bedside.    Today is hospital day 6d.  Mr. Estevez is doing fine. No overnight events  Reports having good night sleep. Appetite is adequate, and denies nausea or vomiting. Denies any abdominal pain, diarrhea, or constipation.   Ambulating and denies any shortness of breath, chest pain, palpitations, or light headedness.  Voiding freely and denies urinary symptoms (dysuria, urgency, frequency, intermittence).        Vital Signs in the last 24 hours   Vitals Summary T(C): 36.7 (01-25-23 @ 04:52), Max: 37.2 (01-24-23 @ 20:37)  HR: 76 (01-25-23 @ 04:52) (71 - 76)  BP: 173/76 (01-25-23 @ 04:52) (124/72 - 173/76)  RR: 17 (01-25-23 @ 04:52) (17 - 19)  SpO2: 98% (01-24-23 @ 20:37) (98% - 99%)  Vent Data   Intake/ Output   01-24-23 @ 07:01  -  01-25-23 @ 07:00  --------------------------------------------------------  IN: 0 mL / OUT: 200 mL / NET: -200 mL      Physical Exam  * General Appearance: Alert, cooperative, interactive oriented to time, place, and person, in no acute distress  * Lungs: Respirations unlabored, Fair bilateral air entry, normal breath sounds  * Chest Wall: No tenderness or deformity  * Heart: Regular Rate and Rhythm, normal S1 and S2, no audible murmur, rub, or gallop  * Abdomen: Symmetric, soft, non-tender, bowel sounds active all four quadrants  * Extremities: Extremities normal, atraumatic, no cyanosis, no lower extremity pitting edema bilaterally      Investigations   Laboratory Workup  - CBC:                        9.8    3.60  )-----------( 101      ( 25 Jan 2023 08:06 )             28.1     - Chemistry:  01-25    135  |  103  |  29<H>  ----------------------------<  77  3.8   |  22  |  1.5    Ca    8.1<L>      25 Jan 2023 08:06  Mg     2.0     01-25    TPro  5.3<L>  /  Alb  2.9<L>  /  TBili  0.6  /  DBili  x   /  AST  21  /  ALT  11  /  AlkPhos  68  01-25    - Coagulation Studies:    - ABG:    - Cardiac Markers:        Microbiological Workup        Radiological Workup  *  Current Medications  Standing Medications  chlorhexidine 2% Cloths 1 Application(s) Topical <User Schedule>  furosemide    Tablet 20 milliGRAM(s) Oral daily  heparin   Injectable 5000 Unit(s) SubCutaneous every 12 hours  hydrALAZINE 25 milliGRAM(s) Oral three times a day  magnesium oxide 400 milliGRAM(s) Oral three times a day with meals  pantoprazole    Tablet 40 milliGRAM(s) Oral before breakfast  sodium chloride 0.9%. 1000 milliLiter(s) (60 mL/Hr) IV Continuous <Continuous>    PRN Medications  acetaminophen     Tablet .. 650 milliGRAM(s) Oral every 6 hours PRN Temp greater or equal to 38.5C (101.3F), Mild Pain (1 - 3)  guaifenesin/dextromethorphan Oral Liquid 10 milliLiter(s) Oral every 8 hours PRN Cough    Singles Doses Administered  (ADM OVERRIDE) 1 each &lt;see task&gt; GiveOnce  (Floorstock) 1 each &lt;see task&gt; GiveOnce  (Floorstock) 1 each &lt;see task&gt; GiveOnce  (Floorstock) 2 each &lt;see task&gt; GiveOnce  remdesivir  IVPB   IV Intermittent   remdesivir  IVPB 200 milliGRAM(s) IV Intermittent every 24 hours  remdesivir  IVPB 100 milliGRAM(s) IV Intermittent every 24 hours  sodium chloride 0.9% Bolus 1000 milliLiter(s) IV Bolus once

## 2023-01-25 NOTE — CHART NOTE - NSCHARTNOTEFT_GEN_A_CORE
Impression:  Called back for CTA results confirming right vertebral artery occlusion seen on MRA.  < from: CT Angio Head w/ IV Cont (01.25.23 @ 13:54) >    IMPRESSION:    1.  No evidence of aneurysm or AVM.    2.  Occlusion of the right vertebral artery.    3.  Additional scattered atheromatous changesincluding moderate stenosis   of the paraclinoid segment of the right ICA and mild to moderate stenoses   of bilateral ACAs, MCA's and PCAs.    < end of copied text >        Suggestion:  Will go over case with neurovascular attending and assess for need for ASA and Lipitor.   Will follow   [phone]

## 2023-01-25 NOTE — PROGRESS NOTE ADULT - ASSESSMENT
Mr. Estevez 77 year old cantonese speaking males with PMHx of CHF unknown ef, hypertension, hyperlipidemia, CAD, CKD3, CVA 8 years ago presenting for evaluation of lightheadedness, shortness of breath and vomiting for past 2 days..    #Hyponatremia most likely hypovolemic in setting of COVID and poor po intake/emesis   - s/p NS hydration with appropriate correction  - monitor of NS  - f/u urine lytes  - trend BMP    #Thalamic hyperdensites r/o intracranial hemorrhage?  - Repeat CT head 6 hours laters: Redemonstration of a rounded hyperdensity within the left subinsular region potentially reflecting calcification, acute hemorrhage or possibly   an aneurysm. Age indeterminate right basal ganglia infarct right occipital and right cerebellar hemisphere.  - evaluated by Neurology and NS  - MR of brain and MRA of head without contrast consistent with possible cavernoma vs AV malformation 1/24  - CTA is recommended ; Will start patient on IVF to minimise risk of DOUGLAS  - OK for DVT prophylaxis per NS attending    #covid infection  - not hypoxic  - completed course of remdesvir   - no need for abx and steroids  - ID recommendations appreciated   - isolation per hospital protocol    #pancytopenia 2/2 to multifactorial   - trend cbc   - keep type and screen     #emma vs CKD  - check urine lytes  - avoid nephrotoxic agents   - s/p ns iv hydration with improvement    - CT abd/pelvis: no hydronephrosis    #CAD/CHF - unknown EF  - f/u ECHO  - avoid volume overload  - restart home meds once BP stabilizes as per neuro     #HTN - home meds reviewed: losartan, furosemide, hydralazine  hydralazine 25mg q8hr restarted and monitor BP  Restart Losartan and lasix gradually    DVT ppx: heparin SQ q12h  Dispo: Pending CTA and will go home with PT

## 2023-01-26 ENCOUNTER — TRANSCRIPTION ENCOUNTER (OUTPATIENT)
Age: 78
End: 2023-01-26

## 2023-01-26 VITALS
HEART RATE: 83 BPM | SYSTOLIC BLOOD PRESSURE: 140 MMHG | OXYGEN SATURATION: 98 % | TEMPERATURE: 98 F | DIASTOLIC BLOOD PRESSURE: 77 MMHG | RESPIRATION RATE: 20 BRPM

## 2023-01-26 LAB
ALBUMIN SERPL ELPH-MCNC: 3.2 G/DL — LOW (ref 3.5–5.2)
ALP SERPL-CCNC: 74 U/L — SIGNIFICANT CHANGE UP (ref 30–115)
ALT FLD-CCNC: 11 U/L — SIGNIFICANT CHANGE UP (ref 0–41)
ANION GAP SERPL CALC-SCNC: 9 MMOL/L — SIGNIFICANT CHANGE UP (ref 7–14)
AST SERPL-CCNC: 22 U/L — SIGNIFICANT CHANGE UP (ref 0–41)
BILIRUB SERPL-MCNC: 0.7 MG/DL — SIGNIFICANT CHANGE UP (ref 0.2–1.2)
BUN SERPL-MCNC: 28 MG/DL — HIGH (ref 10–20)
CALCIUM SERPL-MCNC: 8.6 MG/DL — SIGNIFICANT CHANGE UP (ref 8.4–10.5)
CHLORIDE SERPL-SCNC: 105 MMOL/L — SIGNIFICANT CHANGE UP (ref 98–110)
CO2 SERPL-SCNC: 24 MMOL/L — SIGNIFICANT CHANGE UP (ref 17–32)
CREAT SERPL-MCNC: 1.5 MG/DL — SIGNIFICANT CHANGE UP (ref 0.7–1.5)
EGFR: 48 ML/MIN/1.73M2 — LOW
GLUCOSE BLDC GLUCOMTR-MCNC: 113 MG/DL — HIGH (ref 70–99)
GLUCOSE BLDC GLUCOMTR-MCNC: 149 MG/DL — HIGH (ref 70–99)
GLUCOSE BLDC GLUCOMTR-MCNC: 79 MG/DL — SIGNIFICANT CHANGE UP (ref 70–99)
GLUCOSE BLDC GLUCOMTR-MCNC: 90 MG/DL — SIGNIFICANT CHANGE UP (ref 70–99)
GLUCOSE SERPL-MCNC: 132 MG/DL — HIGH (ref 70–99)
HCT VFR BLD CALC: 31.8 % — LOW (ref 42–52)
HGB BLD-MCNC: 11.1 G/DL — LOW (ref 14–18)
MAGNESIUM SERPL-MCNC: 2.1 MG/DL — SIGNIFICANT CHANGE UP (ref 1.8–2.4)
MCHC RBC-ENTMCNC: 31.6 PG — HIGH (ref 27–31)
MCHC RBC-ENTMCNC: 34.9 G/DL — SIGNIFICANT CHANGE UP (ref 32–37)
MCV RBC AUTO: 90.6 FL — SIGNIFICANT CHANGE UP (ref 80–94)
NRBC # BLD: 0 /100 WBCS — SIGNIFICANT CHANGE UP (ref 0–0)
PLATELET # BLD AUTO: 117 K/UL — LOW (ref 130–400)
POTASSIUM SERPL-MCNC: 3.8 MMOL/L — SIGNIFICANT CHANGE UP (ref 3.5–5)
POTASSIUM SERPL-SCNC: 3.8 MMOL/L — SIGNIFICANT CHANGE UP (ref 3.5–5)
PROT SERPL-MCNC: 5.9 G/DL — LOW (ref 6–8)
RBC # BLD: 3.51 M/UL — LOW (ref 4.7–6.1)
RBC # FLD: 13.4 % — SIGNIFICANT CHANGE UP (ref 11.5–14.5)
SODIUM SERPL-SCNC: 138 MMOL/L — SIGNIFICANT CHANGE UP (ref 135–146)
WBC # BLD: 3.67 K/UL — LOW (ref 4.8–10.8)
WBC # FLD AUTO: 3.67 K/UL — LOW (ref 4.8–10.8)

## 2023-01-26 PROCEDURE — 99239 HOSP IP/OBS DSCHRG MGMT >30: CPT

## 2023-01-26 RX ORDER — ASPIRIN/CALCIUM CARB/MAGNESIUM 324 MG
1 TABLET ORAL
Qty: 30 | Refills: 0
Start: 2023-01-26 | End: 2023-02-24

## 2023-01-26 RX ORDER — ATORVASTATIN CALCIUM 80 MG/1
1 TABLET, FILM COATED ORAL
Qty: 30 | Refills: 0
Start: 2023-01-26 | End: 2023-02-24

## 2023-01-26 RX ORDER — ATORVASTATIN CALCIUM 80 MG/1
80 TABLET, FILM COATED ORAL AT BEDTIME
Refills: 0 | Status: DISCONTINUED | OUTPATIENT
Start: 2023-01-26 | End: 2023-01-26

## 2023-01-26 RX ORDER — ASPIRIN/CALCIUM CARB/MAGNESIUM 324 MG
81 TABLET ORAL DAILY
Refills: 0 | Status: DISCONTINUED | OUTPATIENT
Start: 2023-01-26 | End: 2023-01-26

## 2023-01-26 RX ADMIN — MAGNESIUM OXIDE 400 MG ORAL TABLET 400 MILLIGRAM(S): 241.3 TABLET ORAL at 17:52

## 2023-01-26 RX ADMIN — Medication 81 MILLIGRAM(S): at 12:14

## 2023-01-26 RX ADMIN — PANTOPRAZOLE SODIUM 40 MILLIGRAM(S): 20 TABLET, DELAYED RELEASE ORAL at 06:13

## 2023-01-26 RX ADMIN — MAGNESIUM OXIDE 400 MG ORAL TABLET 400 MILLIGRAM(S): 241.3 TABLET ORAL at 12:13

## 2023-01-26 RX ADMIN — SODIUM CHLORIDE 60 MILLILITER(S): 9 INJECTION INTRAMUSCULAR; INTRAVENOUS; SUBCUTANEOUS at 06:14

## 2023-01-26 RX ADMIN — CHLORHEXIDINE GLUCONATE 1 APPLICATION(S): 213 SOLUTION TOPICAL at 06:14

## 2023-01-26 RX ADMIN — Medication 25 MILLIGRAM(S): at 06:13

## 2023-01-26 RX ADMIN — HEPARIN SODIUM 5000 UNIT(S): 5000 INJECTION INTRAVENOUS; SUBCUTANEOUS at 06:14

## 2023-01-26 RX ADMIN — MAGNESIUM OXIDE 400 MG ORAL TABLET 400 MILLIGRAM(S): 241.3 TABLET ORAL at 08:10

## 2023-01-26 RX ADMIN — HEPARIN SODIUM 5000 UNIT(S): 5000 INJECTION INTRAVENOUS; SUBCUTANEOUS at 17:52

## 2023-01-26 RX ADMIN — Medication 25 MILLIGRAM(S): at 14:22

## 2023-01-26 RX ADMIN — Medication 20 MILLIGRAM(S): at 06:13

## 2023-01-26 NOTE — DISCHARGE NOTE NURSING/CASE MANAGEMENT/SOCIAL WORK - NSDCPEFALRISK_GEN_ALL_CORE
For information on Fall & Injury Prevention, visit: [email or web address] OR  [email or web address] OR  https://www.cdc.gov/steadi/patient.html

## 2023-01-26 NOTE — DISCHARGE NOTE NURSING/CASE MANAGEMENT/SOCIAL WORK - PATIENT PORTAL LINK FT
You can access the FollowMyHealth Patient Portal offered by [facility] by registering at the following website: [email or web address]. By joining [employer]’s FollowMyHealth portal, you will also be able to view your health information using other applications (apps) compatible with our system.

## 2023-02-02 DIAGNOSIS — I25.10 ATHEROSCLEROTIC HEART DISEASE OF NATIVE CORONARY ARTERY WITHOUT ANGINA PECTORIS: ICD-10-CM

## 2023-02-02 DIAGNOSIS — E87.1 HYPO-OSMOLALITY AND HYPONATREMIA: ICD-10-CM

## 2023-02-02 DIAGNOSIS — N17.9 ACUTE KIDNEY FAILURE, UNSPECIFIED: ICD-10-CM

## 2023-02-02 DIAGNOSIS — M10.9 GOUT, UNSPECIFIED: ICD-10-CM

## 2023-02-02 DIAGNOSIS — I13.0 HYPERTENSIVE HEART AND CHRONIC KIDNEY DISEASE WITH HEART FAILURE AND STAGE 1 THROUGH STAGE 4 CHRONIC KIDNEY DISEASE, OR UNSPECIFIED CHRONIC KIDNEY DISEASE: ICD-10-CM

## 2023-02-02 DIAGNOSIS — D61.818 OTHER PANCYTOPENIA: ICD-10-CM

## 2023-02-02 DIAGNOSIS — E78.5 HYPERLIPIDEMIA, UNSPECIFIED: ICD-10-CM

## 2023-02-02 DIAGNOSIS — J12.82 PNEUMONIA DUE TO CORONAVIRUS DISEASE 2019: ICD-10-CM

## 2023-02-02 DIAGNOSIS — Z86.73 PERSONAL HISTORY OF TRANSIENT ISCHEMIC ATTACK (TIA), AND CEREBRAL INFARCTION WITHOUT RESIDUAL DEFICITS: ICD-10-CM

## 2023-02-02 DIAGNOSIS — K21.9 GASTRO-ESOPHAGEAL REFLUX DISEASE WITHOUT ESOPHAGITIS: ICD-10-CM

## 2023-02-02 DIAGNOSIS — N18.30 CHRONIC KIDNEY DISEASE, STAGE 3 UNSPECIFIED: ICD-10-CM

## 2023-02-02 DIAGNOSIS — I50.9 HEART FAILURE, UNSPECIFIED: ICD-10-CM

## 2023-02-02 DIAGNOSIS — U07.1 COVID-19: ICD-10-CM

## 2023-08-23 ENCOUNTER — INPATIENT (INPATIENT)
Facility: HOSPITAL | Age: 78
LOS: 4 days | Discharge: NURSING, REHAB OR LONG-TERM CARE | DRG: 872 | End: 2023-08-28
Attending: STUDENT IN AN ORGANIZED HEALTH CARE EDUCATION/TRAINING PROGRAM | Admitting: STUDENT IN AN ORGANIZED HEALTH CARE EDUCATION/TRAINING PROGRAM
Payer: MEDICARE

## 2023-08-23 VITALS
HEIGHT: 64 IN | HEART RATE: 110 BPM | OXYGEN SATURATION: 99 % | SYSTOLIC BLOOD PRESSURE: 110 MMHG | DIASTOLIC BLOOD PRESSURE: 60 MMHG | TEMPERATURE: 101 F | WEIGHT: 132.06 LBS | RESPIRATION RATE: 18 BRPM

## 2023-08-23 DIAGNOSIS — R11.2 NAUSEA WITH VOMITING, UNSPECIFIED: ICD-10-CM

## 2023-08-23 PROBLEM — I50.9 HEART FAILURE, UNSPECIFIED: Chronic | Status: ACTIVE | Noted: 2023-01-19

## 2023-08-23 PROBLEM — N18.9 CHRONIC KIDNEY DISEASE, UNSPECIFIED: Chronic | Status: ACTIVE | Noted: 2023-01-19

## 2023-08-23 PROBLEM — I10 ESSENTIAL (PRIMARY) HYPERTENSION: Chronic | Status: ACTIVE | Noted: 2023-01-19

## 2023-08-23 PROBLEM — I25.10 ATHEROSCLEROTIC HEART DISEASE OF NATIVE CORONARY ARTERY WITHOUT ANGINA PECTORIS: Chronic | Status: ACTIVE | Noted: 2023-01-19

## 2023-08-23 PROBLEM — I63.9 CEREBRAL INFARCTION, UNSPECIFIED: Chronic | Status: ACTIVE | Noted: 2023-01-19

## 2023-08-23 LAB
ALBUMIN SERPL ELPH-MCNC: 3.3 G/DL — LOW (ref 3.5–5.2)
ALBUMIN SERPL ELPH-MCNC: 4 G/DL — SIGNIFICANT CHANGE UP (ref 3.5–5.2)
ALP SERPL-CCNC: 158 U/L — HIGH (ref 30–115)
ALP SERPL-CCNC: 178 U/L — HIGH (ref 30–115)
ALT FLD-CCNC: 319 U/L — HIGH (ref 0–41)
ALT FLD-CCNC: 460 U/L — HIGH (ref 0–41)
ANION GAP SERPL CALC-SCNC: 10 MMOL/L — SIGNIFICANT CHANGE UP (ref 7–14)
ANION GAP SERPL CALC-SCNC: 8 MMOL/L — SIGNIFICANT CHANGE UP (ref 7–14)
APPEARANCE UR: CLEAR — SIGNIFICANT CHANGE UP
AST SERPL-CCNC: 381 U/L — HIGH (ref 0–41)
AST SERPL-CCNC: 893 U/L — HIGH (ref 0–41)
BACTERIA # UR AUTO: NEGATIVE /HPF — SIGNIFICANT CHANGE UP
BASOPHILS # BLD AUTO: 0.01 K/UL — SIGNIFICANT CHANGE UP (ref 0–0.2)
BASOPHILS # BLD AUTO: 0.02 K/UL — SIGNIFICANT CHANGE UP (ref 0–0.2)
BASOPHILS NFR BLD AUTO: 0.1 % — SIGNIFICANT CHANGE UP (ref 0–1)
BASOPHILS NFR BLD AUTO: 0.1 % — SIGNIFICANT CHANGE UP (ref 0–1)
BILIRUB DIRECT SERPL-MCNC: 2 MG/DL — HIGH (ref 0–0.3)
BILIRUB INDIRECT FLD-MCNC: 0.2 MG/DL — SIGNIFICANT CHANGE UP (ref 0.2–1.2)
BILIRUB SERPL-MCNC: 1.9 MG/DL — HIGH (ref 0.2–1.2)
BILIRUB SERPL-MCNC: 2.2 MG/DL — HIGH (ref 0.2–1.2)
BILIRUB UR-MCNC: NEGATIVE — SIGNIFICANT CHANGE UP
BUN SERPL-MCNC: 35 MG/DL — HIGH (ref 10–20)
BUN SERPL-MCNC: 35 MG/DL — HIGH (ref 10–20)
CALCIUM SERPL-MCNC: 7.8 MG/DL — LOW (ref 8.4–10.5)
CALCIUM SERPL-MCNC: 9.1 MG/DL — SIGNIFICANT CHANGE UP (ref 8.4–10.4)
CAST: 0 /LPF — SIGNIFICANT CHANGE UP (ref 0–4)
CHLORIDE SERPL-SCNC: 107 MMOL/L — SIGNIFICANT CHANGE UP (ref 98–110)
CHLORIDE SERPL-SCNC: 111 MMOL/L — HIGH (ref 98–110)
CO2 SERPL-SCNC: 14 MMOL/L — LOW (ref 17–32)
CO2 SERPL-SCNC: 16 MMOL/L — LOW (ref 17–32)
COLOR SPEC: YELLOW — SIGNIFICANT CHANGE UP
CREAT SERPL-MCNC: 2.1 MG/DL — HIGH (ref 0.7–1.5)
CREAT SERPL-MCNC: 2.3 MG/DL — HIGH (ref 0.7–1.5)
DIFF PNL FLD: NEGATIVE — SIGNIFICANT CHANGE UP
EGFR: 29 ML/MIN/1.73M2 — LOW
EGFR: 32 ML/MIN/1.73M2 — LOW
EOSINOPHIL # BLD AUTO: 0 K/UL — SIGNIFICANT CHANGE UP (ref 0–0.7)
EOSINOPHIL # BLD AUTO: 0 K/UL — SIGNIFICANT CHANGE UP (ref 0–0.7)
EOSINOPHIL NFR BLD AUTO: 0 % — SIGNIFICANT CHANGE UP (ref 0–8)
EOSINOPHIL NFR BLD AUTO: 0 % — SIGNIFICANT CHANGE UP (ref 0–8)
FLUAV AG NPH QL: SIGNIFICANT CHANGE UP
FLUBV AG NPH QL: SIGNIFICANT CHANGE UP
GAS PNL BLDV: SIGNIFICANT CHANGE UP
GLUCOSE SERPL-MCNC: 109 MG/DL — HIGH (ref 70–99)
GLUCOSE SERPL-MCNC: 125 MG/DL — HIGH (ref 70–99)
GLUCOSE UR QL: 250 MG/DL
HCT VFR BLD CALC: 29.8 % — LOW (ref 42–52)
HCT VFR BLD CALC: 37.7 % — LOW (ref 42–52)
HGB BLD-MCNC: 12.6 G/DL — LOW (ref 14–18)
HGB BLD-MCNC: 9.8 G/DL — LOW (ref 14–18)
IMM GRANULOCYTES NFR BLD AUTO: 0.5 % — HIGH (ref 0.1–0.3)
IMM GRANULOCYTES NFR BLD AUTO: 0.7 % — HIGH (ref 0.1–0.3)
KETONES UR-MCNC: NEGATIVE MG/DL — SIGNIFICANT CHANGE UP
LACTATE SERPL-SCNC: 1.2 MMOL/L — SIGNIFICANT CHANGE UP (ref 0.7–2)
LACTATE SERPL-SCNC: 2.7 MMOL/L — HIGH (ref 0.7–2)
LEUKOCYTE ESTERASE UR-ACNC: NEGATIVE — SIGNIFICANT CHANGE UP
LIDOCAIN IGE QN: 100 U/L — HIGH (ref 7–60)
LIDOCAIN IGE QN: 66 U/L — HIGH (ref 7–60)
LYMPHOCYTES # BLD AUTO: 0.11 K/UL — LOW (ref 1.2–3.4)
LYMPHOCYTES # BLD AUTO: 0.31 K/UL — LOW (ref 1.2–3.4)
LYMPHOCYTES # BLD AUTO: 0.9 % — LOW (ref 20.5–51.1)
LYMPHOCYTES # BLD AUTO: 1.9 % — LOW (ref 20.5–51.1)
MAGNESIUM SERPL-MCNC: 1.9 MG/DL — SIGNIFICANT CHANGE UP (ref 1.8–2.4)
MCHC RBC-ENTMCNC: 31.3 PG — HIGH (ref 27–31)
MCHC RBC-ENTMCNC: 31.5 PG — HIGH (ref 27–31)
MCHC RBC-ENTMCNC: 32.9 G/DL — SIGNIFICANT CHANGE UP (ref 32–37)
MCHC RBC-ENTMCNC: 33.4 G/DL — SIGNIFICANT CHANGE UP (ref 32–37)
MCV RBC AUTO: 93.5 FL — SIGNIFICANT CHANGE UP (ref 80–94)
MCV RBC AUTO: 95.8 FL — HIGH (ref 80–94)
MONOCYTES # BLD AUTO: 0.07 K/UL — LOW (ref 0.1–0.6)
MONOCYTES # BLD AUTO: 1.02 K/UL — HIGH (ref 0.1–0.6)
MONOCYTES NFR BLD AUTO: 0.6 % — LOW (ref 1.7–9.3)
MONOCYTES NFR BLD AUTO: 6.1 % — SIGNIFICANT CHANGE UP (ref 1.7–9.3)
NEUTROPHILS # BLD AUTO: 11.92 K/UL — HIGH (ref 1.4–6.5)
NEUTROPHILS # BLD AUTO: 15.16 K/UL — HIGH (ref 1.4–6.5)
NEUTROPHILS NFR BLD AUTO: 91.4 % — HIGH (ref 42.2–75.2)
NEUTROPHILS NFR BLD AUTO: 97.7 % — HIGH (ref 42.2–75.2)
NITRITE UR-MCNC: NEGATIVE — SIGNIFICANT CHANGE UP
NRBC # BLD: 0 /100 WBCS — SIGNIFICANT CHANGE UP (ref 0–0)
NRBC # BLD: 0 /100 WBCS — SIGNIFICANT CHANGE UP (ref 0–0)
PH UR: 6 — SIGNIFICANT CHANGE UP (ref 5–8)
PLATELET # BLD AUTO: 101 K/UL — LOW (ref 130–400)
PLATELET # BLD AUTO: 85 K/UL — LOW (ref 130–400)
PMV BLD: 10.7 FL — HIGH (ref 7.4–10.4)
PMV BLD: 11 FL — HIGH (ref 7.4–10.4)
POTASSIUM SERPL-MCNC: SIGNIFICANT CHANGE UP MMOL/L (ref 3.5–5)
POTASSIUM SERPL-SCNC: SIGNIFICANT CHANGE UP MMOL/L (ref 3.5–5)
PROT SERPL-MCNC: 5.7 G/DL — LOW (ref 6–8)
PROT SERPL-MCNC: 7.8 G/DL — SIGNIFICANT CHANGE UP (ref 6–8)
PROT UR-MCNC: 100 MG/DL
RBC # BLD: 3.11 M/UL — LOW (ref 4.7–6.1)
RBC # BLD: 4.03 M/UL — LOW (ref 4.7–6.1)
RBC # FLD: 13.5 % — SIGNIFICANT CHANGE UP (ref 11.5–14.5)
RBC # FLD: 14.2 % — SIGNIFICANT CHANGE UP (ref 11.5–14.5)
RBC CASTS # UR COMP ASSIST: 1 /HPF — SIGNIFICANT CHANGE UP (ref 0–4)
RSV RNA NPH QL NAA+NON-PROBE: SIGNIFICANT CHANGE UP
SARS-COV-2 RNA SPEC QL NAA+PROBE: SIGNIFICANT CHANGE UP
SODIUM SERPL-SCNC: 131 MMOL/L — LOW (ref 135–146)
SODIUM SERPL-SCNC: 135 MMOL/L — SIGNIFICANT CHANGE UP (ref 135–146)
SP GR SPEC: 1.02 — SIGNIFICANT CHANGE UP (ref 1–1.03)
SQUAMOUS # UR AUTO: 0 /HPF — SIGNIFICANT CHANGE UP (ref 0–5)
TROPONIN T SERPL-MCNC: <0.01 NG/ML — SIGNIFICANT CHANGE UP
UROBILINOGEN FLD QL: 0.2 MG/DL — SIGNIFICANT CHANGE UP (ref 0.2–1)
WBC # BLD: 12.19 K/UL — HIGH (ref 4.8–10.8)
WBC # BLD: 16.59 K/UL — HIGH (ref 4.8–10.8)
WBC # FLD AUTO: 12.19 K/UL — HIGH (ref 4.8–10.8)
WBC # FLD AUTO: 16.59 K/UL — HIGH (ref 4.8–10.8)
WBC UR QL: 0 /HPF — SIGNIFICANT CHANGE UP (ref 0–5)

## 2023-08-23 PROCEDURE — C1889: CPT

## 2023-08-23 PROCEDURE — 83735 ASSAY OF MAGNESIUM: CPT

## 2023-08-23 PROCEDURE — 83605 ASSAY OF LACTIC ACID: CPT

## 2023-08-23 PROCEDURE — 86900 BLOOD TYPING SEROLOGIC ABO: CPT

## 2023-08-23 PROCEDURE — 86480 TB TEST CELL IMMUN MEASURE: CPT

## 2023-08-23 PROCEDURE — 82962 GLUCOSE BLOOD TEST: CPT

## 2023-08-23 PROCEDURE — 87207 SMEAR SPECIAL STAIN: CPT

## 2023-08-23 PROCEDURE — 93306 TTE W/DOPPLER COMPLETE: CPT

## 2023-08-23 PROCEDURE — 85018 HEMOGLOBIN: CPT

## 2023-08-23 PROCEDURE — 71260 CT THORAX DX C+: CPT | Mod: 26,MA

## 2023-08-23 PROCEDURE — 82247 BILIRUBIN TOTAL: CPT

## 2023-08-23 PROCEDURE — 74018 RADEX ABDOMEN 1 VIEW: CPT

## 2023-08-23 PROCEDURE — 85014 HEMATOCRIT: CPT

## 2023-08-23 PROCEDURE — 84145 PROCALCITONIN (PCT): CPT

## 2023-08-23 PROCEDURE — 36415 COLL VENOUS BLD VENIPUNCTURE: CPT

## 2023-08-23 PROCEDURE — 83690 ASSAY OF LIPASE: CPT

## 2023-08-23 PROCEDURE — C9399: CPT

## 2023-08-23 PROCEDURE — 82248 BILIRUBIN DIRECT: CPT

## 2023-08-23 PROCEDURE — 84295 ASSAY OF SERUM SODIUM: CPT

## 2023-08-23 PROCEDURE — C1769: CPT

## 2023-08-23 PROCEDURE — 80074 ACUTE HEPATITIS PANEL: CPT

## 2023-08-23 PROCEDURE — 71045 X-RAY EXAM CHEST 1 VIEW: CPT | Mod: 26

## 2023-08-23 PROCEDURE — 86850 RBC ANTIBODY SCREEN: CPT

## 2023-08-23 PROCEDURE — 93005 ELECTROCARDIOGRAM TRACING: CPT

## 2023-08-23 PROCEDURE — 76705 ECHO EXAM OF ABDOMEN: CPT | Mod: 26

## 2023-08-23 PROCEDURE — 86901 BLOOD TYPING SEROLOGIC RH(D): CPT

## 2023-08-23 PROCEDURE — 87389 HIV-1 AG W/HIV-1&-2 AB AG IA: CPT

## 2023-08-23 PROCEDURE — 82330 ASSAY OF CALCIUM: CPT

## 2023-08-23 PROCEDURE — 82803 BLOOD GASES ANY COMBINATION: CPT

## 2023-08-23 PROCEDURE — 94640 AIRWAY INHALATION TREATMENT: CPT

## 2023-08-23 PROCEDURE — 74177 CT ABD & PELVIS W/CONTRAST: CPT | Mod: 26,MA

## 2023-08-23 PROCEDURE — 71045 X-RAY EXAM CHEST 1 VIEW: CPT

## 2023-08-23 PROCEDURE — 83615 LACTATE (LD) (LDH) ENZYME: CPT

## 2023-08-23 PROCEDURE — 85610 PROTHROMBIN TIME: CPT

## 2023-08-23 PROCEDURE — 80202 ASSAY OF VANCOMYCIN: CPT

## 2023-08-23 PROCEDURE — 82550 ASSAY OF CK (CPK): CPT

## 2023-08-23 PROCEDURE — 85025 COMPLETE CBC W/AUTO DIFF WBC: CPT

## 2023-08-23 PROCEDURE — C2625: CPT

## 2023-08-23 PROCEDURE — 80053 COMPREHEN METABOLIC PANEL: CPT

## 2023-08-23 PROCEDURE — 84132 ASSAY OF SERUM POTASSIUM: CPT

## 2023-08-23 PROCEDURE — 99285 EMERGENCY DEPT VISIT HI MDM: CPT | Mod: FS

## 2023-08-23 PROCEDURE — 0225U NFCT DS DNA&RNA 21 SARSCOV2: CPT

## 2023-08-23 PROCEDURE — 97162 PT EVAL MOD COMPLEX 30 MIN: CPT | Mod: GP

## 2023-08-23 RX ORDER — CHLORHEXIDINE GLUCONATE 213 G/1000ML
1 SOLUTION TOPICAL
Refills: 0 | Status: DISCONTINUED | OUTPATIENT
Start: 2023-08-23 | End: 2023-08-28

## 2023-08-23 RX ORDER — ONDANSETRON 8 MG/1
4 TABLET, FILM COATED ORAL ONCE
Refills: 0 | Status: COMPLETED | OUTPATIENT
Start: 2023-08-23 | End: 2023-08-23

## 2023-08-23 RX ORDER — SODIUM CHLORIDE 9 MG/ML
1000 INJECTION, SOLUTION INTRAVENOUS
Refills: 0 | Status: DISCONTINUED | OUTPATIENT
Start: 2023-08-23 | End: 2023-08-24

## 2023-08-23 RX ORDER — SODIUM CHLORIDE 9 MG/ML
500 INJECTION INTRAMUSCULAR; INTRAVENOUS; SUBCUTANEOUS ONCE
Refills: 0 | Status: COMPLETED | OUTPATIENT
Start: 2023-08-23 | End: 2023-08-23

## 2023-08-23 RX ORDER — SODIUM CHLORIDE 9 MG/ML
250 INJECTION INTRAMUSCULAR; INTRAVENOUS; SUBCUTANEOUS ONCE
Refills: 0 | Status: COMPLETED | OUTPATIENT
Start: 2023-08-23 | End: 2023-08-23

## 2023-08-23 RX ORDER — ACETAMINOPHEN 500 MG
975 TABLET ORAL ONCE
Refills: 0 | Status: COMPLETED | OUTPATIENT
Start: 2023-08-23 | End: 2023-08-23

## 2023-08-23 RX ORDER — VANCOMYCIN HCL 1 G
1000 VIAL (EA) INTRAVENOUS ONCE
Refills: 0 | Status: COMPLETED | OUTPATIENT
Start: 2023-08-23 | End: 2023-08-23

## 2023-08-23 RX ORDER — SODIUM CHLORIDE 9 MG/ML
1500 INJECTION INTRAMUSCULAR; INTRAVENOUS; SUBCUTANEOUS ONCE
Refills: 0 | Status: COMPLETED | OUTPATIENT
Start: 2023-08-23 | End: 2023-08-23

## 2023-08-23 RX ORDER — CEFEPIME 1 G/1
2000 INJECTION, POWDER, FOR SOLUTION INTRAMUSCULAR; INTRAVENOUS ONCE
Refills: 0 | Status: COMPLETED | OUTPATIENT
Start: 2023-08-23 | End: 2023-08-23

## 2023-08-23 RX ADMIN — SODIUM CHLORIDE 500 MILLILITER(S): 9 INJECTION INTRAMUSCULAR; INTRAVENOUS; SUBCUTANEOUS at 13:55

## 2023-08-23 RX ADMIN — Medication 975 MILLIGRAM(S): at 14:24

## 2023-08-23 RX ADMIN — CEFEPIME 100 MILLIGRAM(S): 1 INJECTION, POWDER, FOR SOLUTION INTRAMUSCULAR; INTRAVENOUS at 20:27

## 2023-08-23 RX ADMIN — SODIUM CHLORIDE 1500 MILLILITER(S): 9 INJECTION INTRAMUSCULAR; INTRAVENOUS; SUBCUTANEOUS at 15:42

## 2023-08-23 RX ADMIN — SODIUM CHLORIDE 500 MILLILITER(S): 9 INJECTION INTRAMUSCULAR; INTRAVENOUS; SUBCUTANEOUS at 20:28

## 2023-08-23 RX ADMIN — ONDANSETRON 4 MILLIGRAM(S): 8 TABLET, FILM COATED ORAL at 13:55

## 2023-08-23 RX ADMIN — Medication 250 MILLIGRAM(S): at 15:42

## 2023-08-23 NOTE — ED PROVIDER NOTE - NS ED ATTENDING STATEMENT MOD
Attending with This was a shared visit with the JAYSON. I reviewed and verified the documentation and independently performed the documented:

## 2023-08-23 NOTE — ED PROVIDER NOTE - OBJECTIVE STATEMENT
78 yo male with a pmh of htn, cad, ckd, cva presents c/o n/v/subjective fevers that started this morning. pt has experienced 5+ episodes of NBNB emesis and lower abdominal pain. pt denies any other symptoms including fevers, chill, headache, recent illness/travel, cough, chest pain, or SOB.

## 2023-08-23 NOTE — ED PROVIDER NOTE - CLINICAL SUMMARY MEDICAL DECISION MAKING FREE TEXT BOX
Patient past medical history as above presents with subjective fevers 5 episodes nonbilious emesis, abdominal pain presents with transaminitis we will admit for nausea vomiting symptoms such

## 2023-08-23 NOTE — ED PROVIDER NOTE - ATTENDING APP SHARED VISIT CONTRIBUTION OF CARE
77-year-old female past medical history as above presents with fever nausea no vomiting cough URI symptoms for past 2 days.  Frail unwell appearing NAD, non toxic. NCAT PERRLA EOMI neck supple non tender normal wob cta bl rrr abdomen s nt nd no rebound no guarding [id number] neuro non focal.  CAT scan labs antibiotics rule out sepsis

## 2023-08-24 LAB
ALBUMIN SERPL ELPH-MCNC: 2.7 G/DL — LOW (ref 3.5–5.2)
ALBUMIN SERPL ELPH-MCNC: 2.9 G/DL — LOW (ref 3.5–5.2)
ALBUMIN SERPL ELPH-MCNC: 3 G/DL — LOW (ref 3.5–5.2)
ALP SERPL-CCNC: 152 U/L — HIGH (ref 30–115)
ALP SERPL-CCNC: 154 U/L — HIGH (ref 30–115)
ALP SERPL-CCNC: 165 U/L — HIGH (ref 30–115)
ALT FLD-CCNC: 184 U/L — HIGH (ref 0–41)
ALT FLD-CCNC: 217 U/L — HIGH (ref 0–41)
ALT FLD-CCNC: 226 U/L — HIGH (ref 0–41)
ANION GAP SERPL CALC-SCNC: 10 MMOL/L — SIGNIFICANT CHANGE UP (ref 7–14)
ANION GAP SERPL CALC-SCNC: 11 MMOL/L — SIGNIFICANT CHANGE UP (ref 7–14)
ANION GAP SERPL CALC-SCNC: 9 MMOL/L — SIGNIFICANT CHANGE UP (ref 7–14)
AST SERPL-CCNC: 138 U/L — HIGH (ref 0–41)
AST SERPL-CCNC: 171 U/L — HIGH (ref 0–41)
AST SERPL-CCNC: 189 U/L — HIGH (ref 0–41)
BASOPHILS # BLD AUTO: 0.02 K/UL — SIGNIFICANT CHANGE UP (ref 0–0.2)
BASOPHILS # BLD AUTO: 0.02 K/UL — SIGNIFICANT CHANGE UP (ref 0–0.2)
BASOPHILS NFR BLD AUTO: 0.2 % — SIGNIFICANT CHANGE UP (ref 0–1)
BASOPHILS NFR BLD AUTO: 0.2 % — SIGNIFICANT CHANGE UP (ref 0–1)
BILIRUB DIRECT SERPL-MCNC: 2.3 MG/DL — HIGH (ref 0–0.3)
BILIRUB DIRECT SERPL-MCNC: 2.4 MG/DL — HIGH (ref 0–0.3)
BILIRUB INDIRECT FLD-MCNC: 0.1 MG/DL — LOW (ref 0.2–1.2)
BILIRUB INDIRECT FLD-MCNC: 0.3 MG/DL — SIGNIFICANT CHANGE UP (ref 0.2–1.2)
BILIRUB SERPL-MCNC: 2.2 MG/DL — HIGH (ref 0.2–1.2)
BILIRUB SERPL-MCNC: 2.5 MG/DL — HIGH (ref 0.2–1.2)
BILIRUB SERPL-MCNC: 2.6 MG/DL — HIGH (ref 0.2–1.2)
BILIRUB SERPL-MCNC: 2.7 MG/DL — HIGH (ref 0.2–1.2)
BILIRUB SERPL-MCNC: 2.7 MG/DL — HIGH (ref 0.2–1.2)
BLD GP AB SCN SERPL QL: SIGNIFICANT CHANGE UP
BUN SERPL-MCNC: 36 MG/DL — HIGH (ref 10–20)
BUN SERPL-MCNC: 36 MG/DL — HIGH (ref 10–20)
BUN SERPL-MCNC: 37 MG/DL — HIGH (ref 10–20)
CALCIUM SERPL-MCNC: 7.9 MG/DL — LOW (ref 8.4–10.5)
CALCIUM SERPL-MCNC: 8.3 MG/DL — LOW (ref 8.4–10.4)
CALCIUM SERPL-MCNC: 8.4 MG/DL — SIGNIFICANT CHANGE UP (ref 8.4–10.4)
CHLORIDE SERPL-SCNC: 109 MMOL/L — SIGNIFICANT CHANGE UP (ref 98–110)
CHLORIDE SERPL-SCNC: 109 MMOL/L — SIGNIFICANT CHANGE UP (ref 98–110)
CHLORIDE SERPL-SCNC: 111 MMOL/L — HIGH (ref 98–110)
CO2 SERPL-SCNC: 15 MMOL/L — LOW (ref 17–32)
CO2 SERPL-SCNC: 18 MMOL/L — SIGNIFICANT CHANGE UP (ref 17–32)
CO2 SERPL-SCNC: 18 MMOL/L — SIGNIFICANT CHANGE UP (ref 17–32)
CREAT SERPL-MCNC: 2.1 MG/DL — HIGH (ref 0.7–1.5)
CREAT SERPL-MCNC: 2.3 MG/DL — HIGH (ref 0.7–1.5)
CREAT SERPL-MCNC: 2.3 MG/DL — HIGH (ref 0.7–1.5)
CULTURE RESULTS: SIGNIFICANT CHANGE UP
EGFR: 29 ML/MIN/1.73M2 — LOW
EGFR: 29 ML/MIN/1.73M2 — LOW
EGFR: 32 ML/MIN/1.73M2 — LOW
EOSINOPHIL # BLD AUTO: 0.05 K/UL — SIGNIFICANT CHANGE UP (ref 0–0.7)
EOSINOPHIL # BLD AUTO: 0.05 K/UL — SIGNIFICANT CHANGE UP (ref 0–0.7)
EOSINOPHIL NFR BLD AUTO: 0.5 % — SIGNIFICANT CHANGE UP (ref 0–8)
EOSINOPHIL NFR BLD AUTO: 0.6 % — SIGNIFICANT CHANGE UP (ref 0–8)
GAS PNL BLDA: SIGNIFICANT CHANGE UP
GLUCOSE BLDC GLUCOMTR-MCNC: 118 MG/DL — HIGH (ref 70–99)
GLUCOSE BLDC GLUCOMTR-MCNC: 125 MG/DL — HIGH (ref 70–99)
GLUCOSE BLDC GLUCOMTR-MCNC: 129 MG/DL — HIGH (ref 70–99)
GLUCOSE BLDC GLUCOMTR-MCNC: 64 MG/DL — LOW (ref 70–99)
GLUCOSE BLDC GLUCOMTR-MCNC: 69 MG/DL — LOW (ref 70–99)
GLUCOSE BLDC GLUCOMTR-MCNC: 73 MG/DL — SIGNIFICANT CHANGE UP (ref 70–99)
GLUCOSE BLDC GLUCOMTR-MCNC: 95 MG/DL — SIGNIFICANT CHANGE UP (ref 70–99)
GLUCOSE SERPL-MCNC: 117 MG/DL — HIGH (ref 70–99)
GLUCOSE SERPL-MCNC: 119 MG/DL — HIGH (ref 70–99)
GLUCOSE SERPL-MCNC: 125 MG/DL — HIGH (ref 70–99)
HCT VFR BLD CALC: 28.2 % — LOW (ref 42–52)
HCT VFR BLD CALC: 28.6 % — LOW (ref 42–52)
HGB BLD-MCNC: 9.4 G/DL — LOW (ref 14–18)
HGB BLD-MCNC: 9.7 G/DL — LOW (ref 14–18)
IMM GRANULOCYTES NFR BLD AUTO: 0.3 % — SIGNIFICANT CHANGE UP (ref 0.1–0.3)
IMM GRANULOCYTES NFR BLD AUTO: 0.5 % — HIGH (ref 0.1–0.3)
LYMPHOCYTES # BLD AUTO: 0.34 K/UL — LOW (ref 1.2–3.4)
LYMPHOCYTES # BLD AUTO: 0.36 K/UL — LOW (ref 1.2–3.4)
LYMPHOCYTES # BLD AUTO: 3.4 % — LOW (ref 20.5–51.1)
LYMPHOCYTES # BLD AUTO: 4.3 % — LOW (ref 20.5–51.1)
MAGNESIUM SERPL-MCNC: 1.8 MG/DL — SIGNIFICANT CHANGE UP (ref 1.8–2.4)
MAGNESIUM SERPL-MCNC: 1.9 MG/DL — SIGNIFICANT CHANGE UP (ref 1.8–2.4)
MCHC RBC-ENTMCNC: 31.2 PG — HIGH (ref 27–31)
MCHC RBC-ENTMCNC: 31.4 PG — HIGH (ref 27–31)
MCHC RBC-ENTMCNC: 33.3 G/DL — SIGNIFICANT CHANGE UP (ref 32–37)
MCHC RBC-ENTMCNC: 33.9 G/DL — SIGNIFICANT CHANGE UP (ref 32–37)
MCV RBC AUTO: 92.6 FL — SIGNIFICANT CHANGE UP (ref 80–94)
MCV RBC AUTO: 93.7 FL — SIGNIFICANT CHANGE UP (ref 80–94)
MONOCYTES # BLD AUTO: 0.46 K/UL — SIGNIFICANT CHANGE UP (ref 0.1–0.6)
MONOCYTES # BLD AUTO: 0.62 K/UL — HIGH (ref 0.1–0.6)
MONOCYTES NFR BLD AUTO: 5.5 % — SIGNIFICANT CHANGE UP (ref 1.7–9.3)
MONOCYTES NFR BLD AUTO: 6.1 % — SIGNIFICANT CHANGE UP (ref 1.7–9.3)
NEUTROPHILS # BLD AUTO: 7.47 K/UL — HIGH (ref 1.4–6.5)
NEUTROPHILS # BLD AUTO: 9.08 K/UL — HIGH (ref 1.4–6.5)
NEUTROPHILS NFR BLD AUTO: 88.9 % — HIGH (ref 42.2–75.2)
NEUTROPHILS NFR BLD AUTO: 89.5 % — HIGH (ref 42.2–75.2)
NRBC # BLD: 0 /100 WBCS — SIGNIFICANT CHANGE UP (ref 0–0)
NRBC # BLD: 0 /100 WBCS — SIGNIFICANT CHANGE UP (ref 0–0)
PARASITE BLOOD SMEAR RESULT: SIGNIFICANT CHANGE UP
PLATELET # BLD AUTO: 72 K/UL — LOW (ref 130–400)
PLATELET # BLD AUTO: 72 K/UL — LOW (ref 130–400)
PMV BLD: 11.2 FL — HIGH (ref 7.4–10.4)
PMV BLD: 11.5 FL — HIGH (ref 7.4–10.4)
POTASSIUM SERPL-MCNC: 4.9 MMOL/L — SIGNIFICANT CHANGE UP (ref 3.5–5)
POTASSIUM SERPL-MCNC: 6 MMOL/L — CRITICAL HIGH (ref 3.5–5)
POTASSIUM SERPL-SCNC: 4.9 MMOL/L — SIGNIFICANT CHANGE UP (ref 3.5–5)
POTASSIUM SERPL-SCNC: 6 MMOL/L — CRITICAL HIGH (ref 3.5–5)
PROT SERPL-MCNC: 5 G/DL — LOW (ref 6–8)
PROT SERPL-MCNC: 5.3 G/DL — LOW (ref 6–8)
PROT SERPL-MCNC: 5.6 G/DL — LOW (ref 6–8)
RBC # BLD: 3.01 M/UL — LOW (ref 4.7–6.1)
RBC # BLD: 3.09 M/UL — LOW (ref 4.7–6.1)
RBC # FLD: 14.3 % — SIGNIFICANT CHANGE UP (ref 11.5–14.5)
RBC # FLD: 14.5 % — SIGNIFICANT CHANGE UP (ref 11.5–14.5)
SODIUM SERPL-SCNC: 136 MMOL/L — SIGNIFICANT CHANGE UP (ref 135–146)
SODIUM SERPL-SCNC: 137 MMOL/L — SIGNIFICANT CHANGE UP (ref 135–146)
SODIUM SERPL-SCNC: 137 MMOL/L — SIGNIFICANT CHANGE UP (ref 135–146)
SPECIMEN SOURCE: SIGNIFICANT CHANGE UP
WBC # BLD: 10.14 K/UL — SIGNIFICANT CHANGE UP (ref 4.8–10.8)
WBC # BLD: 8.4 K/UL — SIGNIFICANT CHANGE UP (ref 4.8–10.8)
WBC # FLD AUTO: 10.14 K/UL — SIGNIFICANT CHANGE UP (ref 4.8–10.8)
WBC # FLD AUTO: 8.4 K/UL — SIGNIFICANT CHANGE UP (ref 4.8–10.8)

## 2023-08-24 PROCEDURE — 93010 ELECTROCARDIOGRAM REPORT: CPT | Mod: 76

## 2023-08-24 PROCEDURE — 99223 1ST HOSP IP/OBS HIGH 75: CPT

## 2023-08-24 PROCEDURE — 93306 TTE W/DOPPLER COMPLETE: CPT | Mod: 26

## 2023-08-24 RX ORDER — BUDESONIDE AND FORMOTEROL FUMARATE DIHYDRATE 160; 4.5 UG/1; UG/1
2 AEROSOL RESPIRATORY (INHALATION)
Refills: 0 | Status: DISCONTINUED | OUTPATIENT
Start: 2023-08-24 | End: 2023-08-28

## 2023-08-24 RX ORDER — TAMSULOSIN HYDROCHLORIDE 0.4 MG/1
0.4 CAPSULE ORAL AT BEDTIME
Refills: 0 | Status: DISCONTINUED | OUTPATIENT
Start: 2023-08-24 | End: 2023-08-28

## 2023-08-24 RX ORDER — CEFEPIME 1 G/1
1000 INJECTION, POWDER, FOR SOLUTION INTRAMUSCULAR; INTRAVENOUS EVERY 24 HOURS
Refills: 0 | Status: DISCONTINUED | OUTPATIENT
Start: 2023-08-24 | End: 2023-08-28

## 2023-08-24 RX ORDER — VERICIGUAT 2.5 MG/1
1 TABLET, FILM COATED ORAL
Qty: 0 | Refills: 0 | DISCHARGE

## 2023-08-24 RX ORDER — ACETAMINOPHEN 500 MG
650 TABLET ORAL ONCE
Refills: 0 | Status: COMPLETED | OUTPATIENT
Start: 2023-08-24 | End: 2023-08-24

## 2023-08-24 RX ORDER — LOSARTAN POTASSIUM 100 MG/1
1 TABLET, FILM COATED ORAL
Qty: 0 | Refills: 0 | DISCHARGE

## 2023-08-24 RX ORDER — PREGABALIN 225 MG/1
1000 CAPSULE ORAL DAILY
Refills: 0 | Status: DISCONTINUED | OUTPATIENT
Start: 2023-08-24 | End: 2023-08-28

## 2023-08-24 RX ORDER — INSULIN HUMAN 100 [IU]/ML
10 INJECTION, SOLUTION SUBCUTANEOUS ONCE
Refills: 0 | Status: COMPLETED | OUTPATIENT
Start: 2023-08-24 | End: 2023-08-24

## 2023-08-24 RX ORDER — VANCOMYCIN HCL 1 G
750 VIAL (EA) INTRAVENOUS EVERY 24 HOURS
Refills: 0 | Status: DISCONTINUED | OUTPATIENT
Start: 2023-08-24 | End: 2023-08-24

## 2023-08-24 RX ORDER — HYDRALAZINE HCL 50 MG
1 TABLET ORAL
Qty: 0 | Refills: 0 | DISCHARGE

## 2023-08-24 RX ORDER — FUROSEMIDE 40 MG
1 TABLET ORAL
Qty: 0 | Refills: 0 | DISCHARGE

## 2023-08-24 RX ORDER — CALCIUM GLUCONATE 100 MG/ML
1 VIAL (ML) INTRAVENOUS ONCE
Refills: 0 | Status: COMPLETED | OUTPATIENT
Start: 2023-08-24 | End: 2023-08-24

## 2023-08-24 RX ORDER — PANTOPRAZOLE SODIUM 20 MG/1
1 TABLET, DELAYED RELEASE ORAL
Qty: 0 | Refills: 0 | DISCHARGE

## 2023-08-24 RX ORDER — SODIUM CHLORIDE 9 MG/ML
1000 INJECTION, SOLUTION INTRAVENOUS
Refills: 0 | Status: DISCONTINUED | OUTPATIENT
Start: 2023-08-24 | End: 2023-08-25

## 2023-08-24 RX ORDER — HEPARIN SODIUM 5000 [USP'U]/ML
5000 INJECTION INTRAVENOUS; SUBCUTANEOUS EVERY 12 HOURS
Refills: 0 | Status: DISCONTINUED | OUTPATIENT
Start: 2023-08-24 | End: 2023-08-28

## 2023-08-24 RX ORDER — SODIUM BICARBONATE 1 MEQ/ML
0.09 SYRINGE (ML) INTRAVENOUS
Qty: 75 | Refills: 0 | Status: DISCONTINUED | OUTPATIENT
Start: 2023-08-24 | End: 2023-08-24

## 2023-08-24 RX ORDER — SODIUM CHLORIDE 9 MG/ML
1000 INJECTION, SOLUTION INTRAVENOUS
Refills: 0 | Status: DISCONTINUED | OUTPATIENT
Start: 2023-08-24 | End: 2023-08-24

## 2023-08-24 RX ORDER — DEXTROSE 50 % IN WATER 50 %
50 SYRINGE (ML) INTRAVENOUS ONCE
Refills: 0 | Status: COMPLETED | OUTPATIENT
Start: 2023-08-24 | End: 2023-08-24

## 2023-08-24 RX ORDER — SENNA PLUS 8.6 MG/1
2 TABLET ORAL AT BEDTIME
Refills: 0 | Status: DISCONTINUED | OUTPATIENT
Start: 2023-08-24 | End: 2023-08-28

## 2023-08-24 RX ORDER — PANTOPRAZOLE SODIUM 20 MG/1
40 TABLET, DELAYED RELEASE ORAL
Refills: 0 | Status: DISCONTINUED | OUTPATIENT
Start: 2023-08-24 | End: 2023-08-28

## 2023-08-24 RX ORDER — POLYETHYLENE GLYCOL 3350 17 G/17G
17 POWDER, FOR SOLUTION ORAL
Refills: 0 | Status: DISCONTINUED | OUTPATIENT
Start: 2023-08-24 | End: 2023-08-28

## 2023-08-24 RX ORDER — KETOTIFEN FUMARATE 0.34 MG/ML
1 SOLUTION OPHTHALMIC DAILY
Refills: 0 | Status: DISCONTINUED | OUTPATIENT
Start: 2023-08-24 | End: 2023-08-28

## 2023-08-24 RX ORDER — ONDANSETRON 8 MG/1
4 TABLET, FILM COATED ORAL EVERY 8 HOURS
Refills: 0 | Status: DISCONTINUED | OUTPATIENT
Start: 2023-08-24 | End: 2023-08-28

## 2023-08-24 RX ADMIN — Medication 1 PATCH: at 23:12

## 2023-08-24 RX ADMIN — SODIUM CHLORIDE 75 MILLILITER(S): 9 INJECTION, SOLUTION INTRAVENOUS at 21:52

## 2023-08-24 RX ADMIN — SENNA PLUS 2 TABLET(S): 8.6 TABLET ORAL at 22:01

## 2023-08-24 RX ADMIN — KETOTIFEN FUMARATE 1 DROP(S): 0.34 SOLUTION OPHTHALMIC at 23:19

## 2023-08-24 RX ADMIN — HEPARIN SODIUM 5000 UNIT(S): 5000 INJECTION INTRAVENOUS; SUBCUTANEOUS at 05:37

## 2023-08-24 RX ADMIN — SODIUM CHLORIDE 75 MILLILITER(S): 9 INJECTION, SOLUTION INTRAVENOUS at 16:32

## 2023-08-24 RX ADMIN — BUDESONIDE AND FORMOTEROL FUMARATE DIHYDRATE 2 PUFF(S): 160; 4.5 AEROSOL RESPIRATORY (INHALATION) at 22:06

## 2023-08-24 RX ADMIN — TAMSULOSIN HYDROCHLORIDE 0.4 MILLIGRAM(S): 0.4 CAPSULE ORAL at 22:01

## 2023-08-24 RX ADMIN — Medication 50 MILLILITER(S): at 01:20

## 2023-08-24 RX ADMIN — Medication 1 PATCH: at 19:00

## 2023-08-24 RX ADMIN — Medication 1 TABLET(S): at 13:00

## 2023-08-24 RX ADMIN — POLYETHYLENE GLYCOL 3350 17 GRAM(S): 17 POWDER, FOR SOLUTION ORAL at 05:38

## 2023-08-24 RX ADMIN — INSULIN HUMAN 10 UNIT(S): 100 INJECTION, SOLUTION SUBCUTANEOUS at 01:17

## 2023-08-24 RX ADMIN — CHLORHEXIDINE GLUCONATE 1 APPLICATION(S): 213 SOLUTION TOPICAL at 05:38

## 2023-08-24 RX ADMIN — Medication 1 PATCH: at 14:01

## 2023-08-24 RX ADMIN — BUDESONIDE AND FORMOTEROL FUMARATE DIHYDRATE 2 PUFF(S): 160; 4.5 AEROSOL RESPIRATORY (INHALATION) at 10:11

## 2023-08-24 RX ADMIN — POLYETHYLENE GLYCOL 3350 17 GRAM(S): 17 POWDER, FOR SOLUTION ORAL at 18:30

## 2023-08-24 RX ADMIN — Medication 75 MEQ/KG/HR: at 01:43

## 2023-08-24 RX ADMIN — Medication 650 MILLIGRAM(S): at 18:46

## 2023-08-24 RX ADMIN — Medication 1 PATCH: at 23:18

## 2023-08-24 RX ADMIN — PANTOPRAZOLE SODIUM 40 MILLIGRAM(S): 20 TABLET, DELAYED RELEASE ORAL at 05:39

## 2023-08-24 RX ADMIN — HEPARIN SODIUM 5000 UNIT(S): 5000 INJECTION INTRAVENOUS; SUBCUTANEOUS at 18:29

## 2023-08-24 RX ADMIN — Medication 100 GRAM(S): at 01:11

## 2023-08-24 RX ADMIN — PREGABALIN 1000 MICROGRAM(S): 225 CAPSULE ORAL at 13:00

## 2023-08-24 RX ADMIN — CEFEPIME 100 MILLIGRAM(S): 1 INJECTION, POWDER, FOR SOLUTION INTRAMUSCULAR; INTRAVENOUS at 20:25

## 2023-08-24 NOTE — H&P ADULT - HISTORY OF PRESENT ILLNESS
#non anion gap metabolic acidosis    -temp 101, hr 110, bp 110/60  -ECG:Sinus tachycardia, Left anterior fascicular block  -RUQ US:  Cholelithiasis without sonographic evidence of acute cholecystitis.  Echogenic gallbladder focus measuring up to 7 mm, difficult to   differentiate between stone and polyp. Recommend sonographic follow-up.  Nodular contour of liver as seen on CT, suggestive of cirrhosis.  -CT chest/abd/pelv:  No evidence of acute abdominal or pelvic inflammatory process.  Mild lobulated contour of the liver. Early cirrhosis should be considered.  -CXR: Irregular right apical pleural thickening redemonstrated, appears   increased compared to prior. Somewhat patchy right upper lobe opacities   also noted. Right pleural effusion. Recommend chest CT evaluation.  [id number]    wbc 16.59, hg 9.8, mcv 95.8, k 6, cl 111, co2 14, bun 35, cr 2.3, br 2.2, br direct 2,alk phos 158, ast 381, alt 319, lipase 66, lactate 2.7 improved to 1.2, trop negative.  - ABG ph 7.28, pco2 30, hco3 14 78 yo Cantonese speaking  male ( used) with a pmh of CHF unknown ef, hypertension, hyperlipidemia, CAD, CKD, CVA 8 years ago, history of GI bleed per wife??? presenting for c/o n/v/subjective fevers that started this morning. pt has experienced 5+ episodes of NBNB emesis and abdominal and chest pain. + constipation. Denies diarrhea, sore throat, runny nose. reports worsening difficulty urinating over past day.    -temp 101, hr 110, bp 110/60  -ECG:Sinus tachycardia, Left anterior fascicular block  -RUQ US:  Cholelithiasis without sonographic evidence of acute cholecystitis.  Echogenic gallbladder focus measuring up to 7 mm, difficult to   differentiate between stone and polyp. Recommend sonographic follow-up.  Nodular contour of liver as seen on CT, suggestive of cirrhosis.  -CT chest/abd/pelv:  No evidence of acute abdominal or pelvic inflammatory process.  Mild lobulated contour of the liver. Early cirrhosis should be considered.  -CXR: Irregular right apical pleural thickening redemonstrated, appears   increased compared to prior. Somewhat patchy right upper lobe opacities   also noted. Right pleural effusion. Recommend chest CT evaluation.  wbc 16.59, hg 9.8, mcv 95.8, k 6, cl 111, co2 14, bun 35, cr 2.3, br 2.2, br direct 2,alk phos 158, ast 381, alt 319, lipase 66, lactate 2.7 improved to 1.2, trop negative.  - ABG ph 7.28, pco2 30, hco3 14 78 yo Cantonese speaking  male ( used) with a pmh of CHF unknown ef, hypertension, hyperlipidemia, CAD, CKD, CVA 8 years ago, history of GI bleed per wife??? presenting for c/o n/v/subjective fevers that started this morning. pt has experienced 5+ episodes of NBNB emesis and abdominal and chest pain. + constipation. Denies diarrhea, sore throat, runny nose. reports worsening difficulty urinating over past day.    -temp 101, hr 110, bp 110/60  -ECG:Sinus tachycardia, Left anterior fascicular block  -RUQ US:  Cholelithiasis without sonographic evidence of acute cholecystitis.  Echogenic gallbladder focus measuring up to 7 mm, difficult to   differentiate between stone and polyp. Recommend sonographic follow-up.  Nodular contour of liver as seen on CT, suggestive of cirrhosis.  -CT chest/abd/pelv:  No evidence of acute abdominal or pelvic inflammatory process.  Mild lobulated contour of the liver. Early cirrhosis should be considered.  -CXR: Irregular right apical pleural thickening redemonstrated, appears   increased compared to prior. Somewhat patchy right upper lobe opacities   also noted. Right pleural effusion. Recommend chest CT evaluation.  wbc 16.59, hg 9.8, mcv 95.8, k 6, cl 111, co2 14, bun 35, cr 2.3, br 2.2, br direct 2,alk phos 158, ast 381, alt 319, lipase 66, lactate 2.7 improved to 1.2, trop negative.  - ABG ph 7.28, pco2 30, hco3 14    received vanc / cefepime and 2.25 L bolus in ED

## 2023-08-24 NOTE — H&P ADULT - ATTENDING COMMENTS
HPI:  78 yo Cantonese speaking  male ( used) with a pmh of CHF unknown ef, hypertension, hyperlipidemia, CAD, CKD, CVA 8 years ago, history of GI bleed per wife??? presenting for c/o n/v/subjective fevers that started this morning. pt has experienced 5+ episodes of NBNB emesis and abdominal and chest pain. + constipation. Denies diarrhea, sore throat, runny nose. reports worsening difficulty urinating over past day.    -temp 101, hr 110, bp 110/60  -ECG:Sinus tachycardia, Left anterior fascicular block  -RUQ US:  Cholelithiasis without sonographic evidence of acute cholecystitis.  Echogenic gallbladder focus measuring up to 7 mm, difficult to   differentiate between stone and polyp. Recommend sonographic follow-up.  Nodular contour of liver as seen on CT, suggestive of cirrhosis.  -CT chest/abd/pelv:  No evidence of acute abdominal or pelvic inflammatory process.  Mild lobulated contour of the liver. Early cirrhosis should be considered.  -CXR: Irregular right apical pleural thickening redemonstrated, appears   increased compared to prior. Somewhat patchy right upper lobe opacities   also noted. Right pleural effusion. Recommend chest CT evaluation.  wbc 16.59, hg 9.8, mcv 95.8, k 6, cl 111, co2 14, bun 35, cr 2.3, br 2.2, br direct 2,alk phos 158, ast 381, alt 319, lipase 66, lactate 2.7 improved to 1.2, trop negative.  - ABG ph 7.28, pco2 30, hco3 14    received vanc / cefepime and 2.25 L bolus in ED (24 Aug 2023 01:08)  REVIEW OF SYSTEMS: see cc/HPI   CONSTITUTIONAL: No weakness, fevers or chills  EYES/ENT: No visual changes;  No vertigo or throat pain   NECK: No pain or stiffness  RESPIRATORY: No cough, wheezing, hemoptysis; No shortness of breath  CARDIOVASCULAR: No chest pain or palpitations  GASTROINTESTINAL: NO abdominal or epigastric pain. (+) nausea/vomiting, No hematemesis; No diarrhea or constipation. No melena or hematochezia.  GENITOURINARY: No dysuria, frequency or hematuria  NEUROLOGICAL: No numbness or weakness  SKIN: No itching, rashes  ENDO: ( ) hyperglycemia ( ) thyroid disorder ( ) dyslipidemia   HEM: ( ) bleeding ( ) bruising ( ) anemia   PSYCHE: ( ) psychosis  ( ) mood disorder ( ) hallucinations ( ) delusion   MSK: ( ) deformity ( ) fracture    A/p  Fever / Nausea / vomiting / elevated LFTs r/o acute Hepatitis ( labs normal in May)   Metabolic acidosis   -NPO for now and IV fluids w/ HCO3  -IIV Zofran PRN   -Acute and chronic liver disease w/u   -ID and Hepatology eval   -check blood Cx   -c/w broad spectrum Abx for now   -D/c and avoid hepato-toxic Rx     CKD IIIb   Metabolic acidosis - non-anoin  -IV fluid as above  -check ABG    H/o CAD/HF -stable   HTN / Dyslipidemia   -hold statin / Edarbi/ Verquvo for now     DVT prophylaxis     Case d/w daughter and patient via  (Melany - [id number]) - Maya

## 2023-08-24 NOTE — CONSULT NOTE ADULT - SUBJECTIVE AND OBJECTIVE BOX
Gastroenterology Consultation:    Patient is a 77y old  Male who presents with a chief complaint of       Admitted on: 08-23-23      HPI:  76 yo Cantonese speaking  male with a pmh of CHF unknown ef, hypertension, hyperlipidemia, CAD, CKD, CVA 8 years ago, history of GI bleed per wife??? presenting for nausea, vomiting and fever that started this morning. Patient  has experienced 5+ episodes of NBNB emesis and abdominal pain.   RUQ US: Cholelithiasis without sonographic evidence of acute cholecystitis. Echogenic gallbladder focus measuring up to 7 mm, difficult to differentiate between stone and polyp.   Nodular contour of liver as seen on CT, suggestive of cirrhosis.  CT abd/pelv: No evidence of acute abdominal or pelvic inflammatory process. Mild lobulated contour of the liver. Early cirrhosis should be considered.   Patient was found to have leukocytosis and elevated liver enzymes on admission. GI consulted for a concern of cholangitis.       Prior EGD/ colonoscopy:  no records available     PAST MEDICAL & SURGICAL HISTORY:  HTN (hypertension)      CAD (coronary artery disease)      Acute CHF      Cerebrovascular accident (CVA)      Chronic kidney disease, unspecified CKD stage      FAMILY HISTORY:  No fhx of gi cancers     Social History:  Tobacco: denies   Alcohol: denies   Drugs: denies     Home Medications:  atorvastatin 10 mg oral tablet: 1 orally once a day (24 Aug 2023 02:37)  azelastine 0.05% ophthalmic solution: 1 in each affected eye once a day (24 Aug 2023 02:37)  Breztri Aerosphere 160 mcg-9 mcg-4.8 mcg/inh inhalation aerosol: 2 puff(s) inhaled 2 times a day (24 Aug 2023 02:37)  cloNIDine 0.2 mg/24 hr transdermal film, extended release: 1 transdermally once a week (24 Aug 2023 02:37)  docusate sodium 100 mg oral tablet: 1 orally 3 times a day (24 Aug 2023 02:37)  Edarbi 80 mg oral tablet: 1 orally once a day (24 Aug 2023 02:37)  Farxiga 10 mg oral tablet: 1 orally once a day (24 Aug 2023 02:37)  febuxostat 80 mg oral tablet: 1 orally once a day (24 Aug 2023 02:37)  ferrous sulfate 325 mg (65 mg elemental iron) oral delayed release tablet: 1 orally once a day (24 Aug 2023 02:36)  Multiple Vitamins oral tablet: 1 orally once a day (24 Aug 2023 02:37)  Nephplex Rx oral tablet: 1 orally once a day (24 Aug 2023 02:37)  tamsulosin 0.4 mg oral capsule: 1 orally once a day (at bedtime) (24 Aug 2023 02:37)  vericiguat 2.5 mg oral tablet: 1 orally once a day (24 Aug 2023 02:37)  Vitamin B12 500 mcg oral tablet:  (19 Jan 2023 16:52)    MEDICATIONS  (STANDING):  budesonide 160 MICROgram(s)/formoterol 4.5 MICROgram(s) Inhaler 2 Puff(s) Inhalation two times a day  cefepime   IVPB 1000 milliGRAM(s) IV Intermittent every 24 hours  chlorhexidine 2% Cloths 1 Application(s) Topical <User Schedule>  cloNIDine Patch 0.2 mG/24Hr(s) 1 patch Transdermal every 7 days  cyanocobalamin 1000 MICROGram(s) Oral daily  dextrose 5% 1000 milliLiter(s) (75 mL/Hr) IV Continuous <Continuous>  heparin   Injectable 5000 Unit(s) SubCutaneous every 12 hours  ketotifen 0.025% Ophthalmic Solution 1 Drop(s) Both EYES daily  multivitamin 1 Tablet(s) Oral daily  pantoprazole    Tablet 40 milliGRAM(s) Oral before breakfast  polyethylene glycol 3350 17 Gram(s) Oral two times a day  senna 2 Tablet(s) Oral at bedtime  tamsulosin 0.4 milliGRAM(s) Oral at bedtime    MEDICATIONS  (PRN):  ondansetron Injectable 4 jose martin GRAM IV Push every 8 hours PRN Nausea and/or Vomiting    Allergies  No Known Allergies    Review of Systems:   unable to obtain     Physical Examination:  T(C): 38.1 (08-24-23 @ 16:19), Max: 38.1 (08-24-23 @ 16:19)  HR: 96 (08-24-23 @ 16:19) (85 - 96)  BP: 164/75 (08-24-23 @ 16:19) (106/68 - 164/75)  RR: 18 (08-24-23 @ 16:19) (18 - 19)  SpO2: 96% (08-24-23 @ 16:19) (96% - 97%)      GENERAL: No  acute distress.  HEAD:  Atraumatic, Normocephalic  EYES: conjunctiva and sclera clear  NECK: Supple, no JVD or thyromegaly  CHEST/LUNG: Clear to auscultation bilaterally  HEART: Regular rate and rhythm; normal S1, S2, No murmurs.  ABDOMEN: Soft, nontender, nondistended  NEUROLOGY: No asterixis or tremor.   SKIN: Intact, no jaundice        Data:                        9.4    10.14 )-----------( 72       ( 24 Aug 2023 13:30 )             28.2     Hgb Trend:  9.4  08-24-23 @ 13:30  9.8  08-23-23 @ 23:09  12.6  08-23-23 @ 13:26      08-24    137  |  111<H>  |  36<H>  ----------------------------<  125<H>  4.9   |  15<L>  |  2.1<H>    Ca    8.3<L>      24 Aug 2023 13:30  Mg     1.8     08-24    TPro  5.3<L>  /  Alb  2.9<L>  /  TBili  2.7<H>  /  DBili  2.4<H>  /  AST  189<H>  /  ALT  226<H>  /  AlkPhos  154<H>  08-24    Liver panel trend:  TBili 2.7   /      /      /   AlkP 154   /   Tptn 5.3   /   Alb 2.9    /   DBili 2.4      08-24  TBili 2.2   /      /      /   AlkP 158   /   Tptn 5.7   /   Alb 3.3    /   DBili 2.0      08-23  TBili 1.9   /      /      /   AlkP 178   /   Tptn 7.8   /   Alb 4.0    /   DBili --      08-23              Radiology:    US Abdomen Upper Quadrant Right:   ACC: [id number] EXAM:  US ABDOMEN RT UPR QUADRANT   ORDERED BY: LINDA CONDON     PROCEDURE DATE:  08/23/2023          INTERPRETATION:  CLINICAL INFORMATION: Abdominal pain    COMPARISON: Earlier same day CT    TECHNIQUE: Sonography of the right upper quadrant.    FINDINGS:  Liver: Nodular contour of liver as seen on CT suggestive of cirrhosis.  Bile ducts: Normal caliber. Common bile duct measures 5 mm.  Gallbladder: Cholelithiasis. Negative sonographic Sanchez's sign.   Echogenic gallbladder focus measuring up to 7 mm, difficult to   differentiate between stone and polyp. Unable to place patient in   decubitus position.  Pancreas: Poorly visualized.  Right kidney: Not well visualized. 11.0 cm. Multiple renal cysts better   seen on same-day CT. No hydronephrosis.  Ascites: None.    IMPRESSION:    Cholelithiasis without sonographic evidence of acute cholecystitis.    Echogenic gallbladder focus measuring up to 7 mm, difficult to   differentiate between stone and polyp. Recommend sonographic follow-up.    Nodular contour of liver as seen on CT, suggestive of cirrhosis.    --- End of Report ---            RO VILLA MD; Attending Radiologist  This document has been electronically signed. Aug 23 2023  7:53PM (08-23-23 @ 19:50)

## 2023-08-24 NOTE — PATIENT PROFILE ADULT - LEGAL HELP
After Visit Summary   5/21/2018    Marlene Khan    MRN: [id number]           Patient Information     Date Of Birth          1950        Visit Information        Provider Department      5/21/2018 1:00 PM Alessia Mata MD [facility]        Today's Diagnoses     Vaginal discharge    -  1    Dysuria           Follow-ups after your visit        Follow-up notes from your care team     Return if symptoms worsen or fail to improve.      Future tests that were ordered for you today     Open Future Orders        Priority Expected Expires Ordered    Wet prep Routine  6/21/2018 5/21/2018            Who to contact     If you have questions or need follow up information about today's clinic visit or your schedule please contact [facility] directly at [phone].  Normal or non-critical lab and imaging results will be communicated to you by MyChart, letter or phone within 4 business days after the clinic has received the results. If you do not hear from us within 7 days, please contact the clinic through [employer] or phone. If you have a critical or abnormal lab result, we will notify you by phone as soon as possible.  Submit refill requests through [employer] or call your pharmacy and they will forward the refill request to us. Please allow 3 business days for your refill to be completed.          Additional Information About Your Visit        MyChart Information     [employer] gives you secure access to your electronic health record. If you see a primary care provider, you can also send messages to your care team and make appointments. If you have questions, please call your primary care clinic.  If you do not have a primary care provider, please call [phone] and they will assist you.        Care EveryWhere ID     This is your Care EveryWhere ID. This could be used by other organizations to access your [facility] medical records  [id number]        Your Vitals Were      "Pulse Temperature Respirations Height Pulse Oximetry BMI (Body Mass Index)    76 98.2  F (36.8  C) (Temporal) 16 5' 8.9\" (1.75 m) 98% 31.28 kg/m2       Blood Pressure from Last 3 Encounters:   05/21/18 134/78   04/11/17 128/78   12/02/16 140/80    Weight from Last 3 Encounters:   05/21/18 211 lb 3.2 oz (95.8 kg)   04/11/17 207 lb (93.9 kg)   12/02/16 210 lb (95.3 kg)              We Performed the Following     *UA reflex to Microscopic and Culture ([place] and [facility] (except [place] and [place])     Urine Microscopic          Today's Medication Changes          These changes are accurate as of 5/21/18  2:37 PM.  If you have any questions, ask your nurse or doctor.               Start taking these medicines.        Dose/Directions    ciprofloxacin 500 MG tablet   Commonly known as:  CIPRO   Used for:  Dysuria   Started by:  Alessia Mata MD        Dose:  500 mg   Take 1 tablet (500 mg) by mouth 2 times daily for 5 days   Quantity:  10 tablet   Refills:  0            Where to get your medicines      These medications were sent to [facility] [address] [place] [address] [address] [address]     Phone:  [phone]     ciprofloxacin 500 MG tablet                Primary Care Provider Office Phone # Fax #    Deedee ESTEVES MD Chani [phone]       [address] [place] [address] 100  [facility] [id number]        Equal Access to Services     [facility] Hospital[facility]UANITA AH: Hadii aad ku hadasho Soomaali, waaxda luqadaha, qaybta kaalmada adeegyada, marlys bishop. So [facility] [phone].    ATENCIÓN: Si yolyla ashley, tiene a jacob disposición servicios gratuitos de asistencia lingüística. Llame al [phone].    We comply with applicable federal civil rights laws and [place] laws. We do not discriminate on the basis of race, color, national origin, age, disability, sex, sexual orientation, or gender identity.            Thank you!     Thank you for choosing " [facility]  for your care. Our goal is always to provide you with excellent care. Hearing back from our patients is one way we can continue to improve our services. Please take a few minutes to complete the written survey that you may receive in the mail after your visit with us. Thank you!             Your Updated Medication List - Protect others around you: Learn how to safely use, store and throw away your medicines at www.disposemymeds.org.          This list is accurate as of 5/21/18  2:37 PM.  Always use your most recent med list.                   Brand Name Dispense Instructions for use Diagnosis    CALCIUM-VITAMIN D PO      Take 2 tablets by mouth 2 times daily.        ciprofloxacin 500 MG tablet    CIPRO    10 tablet    Take 1 tablet (500 mg) by mouth 2 times daily for 5 days    Dysuria       CLARITIN PO           Cranberry 300 MG Tabs      Take 1 tablet by mouth 2 times daily.        doxycycline 50 MG capsule    VIBRAMYCIN    90 capsule    TAKE 1 CAPSULE (50 MG) BY MOUTH DAILY AS NEEDED    Rosacea       * MULTIVITAMIN PO      Take 1 tablet by mouth daily.        * multivitamin Tabs tablet      Take 1 tablet by mouth daily        naproxen 500 MG tablet    NAPROSYN    30 tablet    Take 1 tablet (500 mg) by mouth 2 times daily as needed for moderate pain    Midline low back pain without sciatica, Iliotibial band tendonitis, unspecified laterality       raloxifene 60 MG tablet    Evista    90 tablet    Take 1 tablet (60 mg) by mouth daily    DCIS (ductal carcinoma in situ) of breast, unspecified laterality       triamcinolone 55 MCG/ACT Inhaler    NASACORT AQ    1 Bottle    Spray 2 sprays into both nostrils daily    Chronic rhinitis       * Notice:  This list has 2 medication(s) that are the same as other medications prescribed for you. Read the directions carefully, and ask your doctor or other care provider to review them with you.       no

## 2023-08-24 NOTE — H&P ADULT - NSHPLABSRESULTS_GEN_ALL_CORE
9.8    16.59 )-----------( 85       ( 23 Aug 2023 23:09 )             29.8       08-23    135  |  111<H>  |  35<H>  ----------------------------<  109<H>  6.0<HH>   |  14<L>  |  2.3<H>    Ca    7.8<L>      23 Aug 2023 23:09  Mg     1.9     08-23    TPro  5.7<L>  /  Alb  3.3<L>  /  TBili  2.2<H>  /  DBili  2.0<H>  /  AST  381<H>  /  ALT  319<H>  /  AlkPhos  158<H>  08-23          ABG - ( 24 Aug 2023 00:23 )  pH, Arterial: 7.28  pH, Blood: x     /  pCO2: 30    /  pO2: 100   / HCO3: 14    / Base Excess: -11.5 /  SaO2: 100.0

## 2023-08-24 NOTE — CHART NOTE - NSCHARTNOTEFT_GEN_A_CORE
Patient seen at bedside.  Appears in no acute distress.  Irregular HR    Plan  1)GI consult  2)ID consult appreciated  3) Metabolic acidosis - continue bicarb drip  4) repeat ekg

## 2023-08-24 NOTE — CONSULT NOTE ADULT - ASSESSMENT
78 yo Cantonese speaking  male with a pmh of CHF unknown ef, hypertension, hyperlipidemia, CAD, CKD, CVA 8 years ago, history of GI bleed per wife??? presenting for nausea, vomiting and fever that started this morning. Patient  has experienced 5+ episodes of NBNB emesis and abdominal pain.   GI consulted for a concern of cholangitis.     # R/O Choledocholithiasis/ Cholangitis   - Sepsis admission   - RUQ US: Cholelithiasis without sonographic evidence of acute cholecystitis. Echogenic gallbladder focus measuring up to 7 mm, difficult to differentiate between stone and polyp.   - CT abd/pelv: No evidence of acute abdominal or pelvic inflammatory process.   - LIVER FUNCTIONS - ( 24 Aug 2023 13:30 )  Alb: 2.9 g/dL / Pro: 5.3 g/dL / ALK PHOS: 154 U/L / ALT: 226 U/L / AST: 189 U/L / GGT: x           PLAN   EUS +/- ERCP tomorrow  NPO after midnight   IV hydration   Abx per ID   Follow cultures   Will follow       # Liver Cirrhosis  CT A/P: Mild lobulated contour of the liver. Early cirrhosis should be considered.    PLAN   check hepatitis serology.   Follow up with hepatology as outpatient for frther workup and screening

## 2023-08-24 NOTE — PATIENT PROFILE ADULT - FALL HARM RISK - HARM RISK INTERVENTIONS

## 2023-08-24 NOTE — H&P ADULT - NSHPREVIEWOFSYSTEMS_GEN_ALL_CORE
REVIEW OF SYSTEMS:    CONSTITUTIONAL:  fevers   EYES/ENT:  No visual changes;  No vertigo or throat pain   NECK:  No pain or stiffness  RESPIRATORY:  per hpi   CARDIOVASCULAR:  per hpi  GASTROINTESTINAL:  per hpi   GENITOURINARY:  per hpi   MSK: no joint pain  SKIN:  No itching, rashes

## 2023-08-24 NOTE — H&P ADULT - NSHPPHYSICALEXAM_GEN_ALL_CORE
LOS: 1d    VITALS:   T(C): 37 (08-23-23 @ 21:18), Max: 38.3 (08-23-23 @ 11:36)  HR: 86 (08-23-23 @ 21:18) (86 - 110)  BP: 106/68 (08-23-23 @ 21:18) (106/68 - 125/59)  RR: 18 (08-23-23 @ 21:18) (18 - 18)  SpO2: 97% (08-23-23 @ 21:18) (96% - 99%)    GENERAL:laying in bed wife bedside   HEAD:  Atraumatic, Normocephalic  EYES: EOMI, PERRLA, conjunctiva and sclera clear  ENT: Moist mucous membranes  NECK: Supple  CHEST/LUNG: Clear to auscultation bilaterally; No rales, rhonchi, wheezing, or rubs. Unlabored respirations  HEART: Regular rate and rhythm; No murmurs, rubs, or gallops  ABDOMEN:  Soft, nontender, nondistended  EXTREMITIES:  no edema  NERVOUS SYSTEM:  A&Ox3,  SKIN: No rashes or lesions

## 2023-08-24 NOTE — H&P ADULT - ASSESSMENT
#non anion gap metabolic acidosis 78 yo Cantonese speaking  male ( used) with a pmh of CHF unknown ef, hypertension, hyperlipidemia, CAD, CKD, CVA 8 years ago, history of GI bleed per wife??? presenting for c/o n/v/subjective fevers that started this morning. pt has experienced 5+ episodes of NBNB emesis and abdominal and chest pain. + constipation. Denies diarrhea, sore throat, runny nose. reports worsening difficulty urinating over past day.    #sirs positive  #fever of unknown origin  #non anion gap metabolic acidosis (without diarrhea)   #r/o RTA  - no clear source of infection  -RUQ US:  Cholelithiasis without sonographic evidence of acute cholecystitis.  Echogenic gallbladder focus measuring up to 7 mm, difficult to   differentiate between stone and polyp. Recommend sonographic follow-up.  Nodular contour of liver as seen on CT, suggestive of cirrhosis.  -CT chest/abd/pelv:  No evidence of acute abdominal or pelvic inflammatory process.  Mild lobulated contour of the liver. Early cirrhosis should be considered.  -CXR: Irregular right apical pleural thickening redemonstrated, appears   increased compared to prior. Somewhat patchy right upper lobe opacities   also noted. Right pleural effusion. Recommend chest CT evaluation.  -f/u procal  -ID consult  -c/w cefepime and vanc  -f/u urine studies   -f/u cultures / mrsa  -sodium bicarb 75 CC per hour  -f/u ck  -f/u bladder scan    #CAD/  #CHF ?  #HTN  #hld  - f/u ECHO  -holding edarbi, Vericiguat  -holding statin    #gout?  -holding febuxostat    #h/o Thalamic hyperdensites   - CT: Redemonstration of a rounded hyperdensity within the left subinsular region potentially reflecting calcification, acute hemorrhage or possibly   an aneurysm. Age indeterminate right basal ganglia infarct right occipital and right cerebellar hemisphere.  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      DVT ppx: heparin SQ q12h  Gi PPX: protonix  Diet dash  Dispo: admit  Diet: dash  Dispo: admit

## 2023-08-24 NOTE — CONSULT NOTE ADULT - ASSESSMENT
ASSESSMENT  78 yo Cantonese speaking  male ( used) with a pmh of CHF unknown ef, hypertension, hyperlipidemia, CAD, CKD, CVA 8 years ago, history of GI bleed per wife??? presenting for c/o n/v/subjective fevers that started this morning. pt has experienced 5+ episodes of NBNB emesis and abdominal and chest pain. + constipation.    IMPRESSION  #  #Severe sepsis on admission T>101F, Pulse>90 WBC 16 lactic acidosis  #Transaminitis  with elevated tbili  #Thrombocytopenia/ Anemia     CXR Irregular right apical pleural thickening redemonstrated, appears   increased compared to prior. Somewhat patchy right upper lobe opacities   also noted. Right pleural effusion. Recommend chest CT evaluation.    CT No evidence of acute abdominal or pelvic inflammatory process.  Mild lobulated contour of the liver. Early cirrhosis should be considered.  < from: US Abdomen Upper Quadrant Right (08.23.23 @ 19:50) >  Cholelithiasis without sonographic evidence of acute cholecystitis.  Echogenic gallbladder focus measuring up to 7 mm, difficult to   differentiate between stone and polyp. Recommend sonographic follow-up.  Nodular contour of liver as seen on CT, suggestive of cirrhosis.        #Abx allergy: No Known Allergies  #KOBE  Creatinine: 2.3 (08-23-23 @ 23:09)    Height (cm): 162.6 (08-23-23 @ 11:36)  Weight (kg): 59.9 (08-23-23 @ 11:36)    RECOMMENDATIONS  This is an incomplete consult note. All final recommendations to follow after interview and examination of the patient. Please follow recommendations noted below.    If any questions, please send a message or call on [employer] Teams  Please continue to update ID with any pertinent new laboratory or radiographic findings ASSESSMENT  78 yo Cantonese speaking  male ( used) with a pmh of CHF unknown ef, hypertension, hyperlipidemia, CAD, CKD, CVA 8 years ago, history of GI bleed per wife??? presenting for c/o n/v/subjective fevers that started this morning. pt has experienced 5+ episodes of NBNB emesis and abdominal and chest pain. + constipation.    IMPRESSION  #Severe sepsis on admission T>101F, Pulse>90 WBC 16 lactic acidosis  #Transaminitis  with elevated tbili  #Thrombocytopenia/ Anemia     CXR Irregular right apical pleural thickening redemonstrated, appears   increased compared to prior. Somewhat patchy right upper lobe opacities   also noted. Right pleural effusion. Recommend chest CT evaluation.    CT No evidence of acute abdominal or pelvic inflammatory process.  Mild lobulated contour of the liver. Early cirrhosis should be considered.  < from: US Abdomen Upper Quadrant Right (08.23.23 @ 19:50) >  Cholelithiasis without sonographic evidence of acute cholecystitis.  Echogenic gallbladder focus measuring up to 7 mm, difficult to   differentiate between stone and polyp. Recommend sonographic follow-up.  Nodular contour of liver as seen on CT, suggestive of cirrhosis.  #Abx allergy: No Known Allergies  #KOBE  Creatinine: 2.3 (08-23-23 @ 23:09)  crcl 23  Height (cm): 162.6 (08-23-23 @ 11:36)  Weight (kg): 59.9 (08-23-23 @ 11:36)    RECOMMENDATIONS  - f/u BCX  - HOLD further Vanc dosing, check AM level 8/25   - Cefepime 1g q24h IV   - RVP  - Parasite smear  - LDH   - Hepatitis panel  - HIV ab/ag  - GI     If any questions, please send a message or call on [employer] Teams  Please continue to update ID with any pertinent new laboratory or radiographic findings

## 2023-08-24 NOTE — PATIENT PROFILE ADULT - FUNCTIONAL ASSESSMENT - DAILY ACTIVITY 4.
Spoke with patient's parents to explain  role and discuss aftercare options.     Outpatient Therapist:   Patient sees  [place] [facility] therapist MaryE llen Herndon LCSW.   Provided verbal consent and will sign an JESSICA for therapist. Patient has a future appt scheduled. See AVS for details.    Outpatient psychiatric medications: Patient sees Dr. Madhu Laughlin ([facility]). Gave verbal consent and agreed to sign an JESSICA for this provider. Dad cancelled his most recent appt due to his hospitalization and plans to reschedule upon discharge.        Primary Care Provider: Patient currently sees PCP Dr. Nelson.  Provided verbal consent and will sign an JESSICA for this provider. Declined admission notification but will sign an JESSICA for records, and is agreeable to contact as needed for any medical concerns.     Provided brief summary of programs available for aftercare. Patient has virtual capabilities at home. Discussed setting up [employer] and proxy access.      will assist with outpatient follow-up care as needed.      Latanya Castellon LCSW  10/13/2022      
2 = A lot of assistance

## 2023-08-24 NOTE — CONSULT NOTE ADULT - SUBJECTIVE AND OBJECTIVE BOX
RITIKA VALERO  77y, Male  Allergy: No Known Allergies      CHIEF COMPLAINT:       LOS  1d    HPI  HPI:  78 yo Cantonese speaking  male ( used) with a pmh of CHF unknown ef, hypertension, hyperlipidemia, CAD, CKD, CVA 8 years ago, history of GI bleed per wife??? presenting for c/o n/v/subjective fevers that started this morning. pt has experienced 5+ episodes of NBNB emesis and abdominal and chest pain. + constipation. Denies diarrhea, sore throat, runny nose. reports worsening difficulty urinating over past day.    -temp 101, hr 110, bp 110/60  -ECG:Sinus tachycardia, Left anterior fascicular block  -RUQ US:  Cholelithiasis without sonographic evidence of acute cholecystitis.  Echogenic gallbladder focus measuring up to 7 mm, difficult to   differentiate between stone and polyp. Recommend sonographic follow-up.  Nodular contour of liver as seen on CT, suggestive of cirrhosis.  -CT chest/abd/pelv:  No evidence of acute abdominal or pelvic inflammatory process.  Mild lobulated contour of the liver. Early cirrhosis should be considered.  -CXR: Irregular right apical pleural thickening redemonstrated, appears   increased compared to prior. Somewhat patchy right upper lobe opacities   also noted. Right pleural effusion. Recommend chest CT evaluation.  wbc 16.59, hg 9.8, mcv 95.8, k 6, cl 111, co2 14, bun 35, cr 2.3, br 2.2, br direct 2,alk phos 158, ast 381, alt 319, lipase 66, lactate 2.7 improved to 1.2, trop negative.  - ABG ph 7.28, pco2 30, hco3 14    received vanc / cefepime and 2.25 L bolus in ED (24 Aug 2023 01:08)      INFECTIOUS DISEASE HISTORY:  ID consulted for fever and sepsis    Currently ordered for:  cefepime   IVPB 1000 milliGRAM(s) IV Intermittent every 24 hours  vancomycin  IVPB 750 milliGRAM(s) IV Intermittent every 24 hours      PMH  PAST MEDICAL & SURGICAL HISTORY:  HTN (hypertension)      CAD (coronary artery disease)      Acute CHF      Cerebrovascular accident (CVA)      Chronic kidney disease, unspecified CKD stage          FAMILY HISTORY  non-contributory     SOCIAL HISTORY  Social History:  denies tobacco, etoh or illicit drug use (19 Jan 2023 15:56)        ROS  ***    VITALS:  T(F): 98.6, Max: 101 (08-23-23 @ 11:36)  HR: 86  BP: 106/68  RR: 18Vital Signs Last 24 Hrs  T(C): 37 (23 Aug 2023 21:18), Max: 38.3 (23 Aug 2023 11:36)  T(F): 98.6 (23 Aug 2023 21:18), Max: 101 (23 Aug 2023 11:36)  HR: 86 (23 Aug 2023 21:18) (86 - 110)  BP: 106/68 (23 Aug 2023 21:18) (106/68 - 125/59)  BP(mean): --  RR: 18 (23 Aug 2023 21:18) (18 - 18)  SpO2: 97% (23 Aug 2023 21:18) (96% - 99%)    Parameters below as of 23 Aug 2023 21:18  Patient On (Oxygen Delivery Method): room air        PHYSICAL EXAM:  ***    TESTS & MEASUREMENTS:                        9.8    16.59 )-----------( 85       ( 23 Aug 2023 23:09 )             29.8     08-23    135  |  111<H>  |  35<H>  ----------------------------<  109<H>  6.0<HH>   |  14<L>  |  2.3<H>    Ca    7.8<L>      23 Aug 2023 23:09  Mg     1.9     08-23    TPro  5.7<L>  /  Alb  3.3<L>  /  TBili  2.2<H>  /  DBili  2.0<H>  /  AST  381<H>  /  ALT  319<H>  /  AlkPhos  158<H>  08-23      LIVER FUNCTIONS - ( 23 Aug 2023 23:09 )  Alb: 3.3 g/dL / Pro: 5.7 g/dL / ALK PHOS: 158 U/L / ALT: 319 U/L / AST: 381 U/L / GGT: x           Urinalysis Basic - ( 23 Aug 2023 23:09 )    Color: x / Appearance: x / SG: x / pH: x  Gluc: 109 mg/dL / Ketone: x  / Bili: x / Urobili: x   Blood: x / Protein: x / Nitrite: x   Leuk Esterase: x / RBC: x / WBC x   Sq Epi: x / Non Sq Epi: x / Bacteria: x        Culture - Blood (collected 01-20-23 @ 06:06)  Source: .Blood None  Final Report (01-25-23 @ 17:00):    No Growth Final    Culture - Blood (collected 01-19-23 @ 21:15)  Source: .Blood Blood  Final Report (01-25-23 @ 02:01):    No Growth Final    Culture - Urine (collected 01-19-23 @ 18:18)  Source: Catheterized Catheterized  Final Report (01-21-23 @ 04:01):    No growth        Lactate, Blood: 1.2 mmol/L (08-23-23 @ 23:09)  Lactate, Blood: 2.7 mmol/L (08-23-23 @ 13:26)      INFECTIOUS DISEASES TESTING  Procalcitonin, Serum: 0.07 ng/mL (01-19-23 @ 21:15)  COVID-19 PCR: Detected (01-19-23 @ 12:00)      INFLAMMATORY MARKERS      RADIOLOGY & ADDITIONAL TESTS:  I have personally reviewed the last Chest xray  CXR  Xray Chest 1 View- PORTABLE-Urgent:   ACC: [id number] EXAM:  XR CHEST PORTABLE URGENT 1V   ORDERED BY: AILEEN NORMAN     PROCEDURE DATE:  08/23/2023          INTERPRETATION:  Clinical History / Reason for exam: Fever    Comparison : Chest radiograph 1/20/2023.    Technique/Positioning: Frontal chest radiograph.    Findings:    Support devices: None.    Cardiac/mediastinum/hilum: Atherosclerotic aorta    Lung parenchyma/Pleura: Irregular right apical pleural thickening   redemonstrated, appears increased compared to prior. Somewhatpatchy   right upper lobe opacities also noted. Right pleural effusion. No   pneumothorax.    Skeleton/soft tissues: Bony demineralization. Degenerative changes.    Impression:    Irregular right apical pleural thickening redemonstrated, appears   increased compared to prior. Somewhat patchy right upper lobe opacities   also noted. Right pleural effusion. Recommend chest CT evaluation.    --- End of Report ---            RO VILLA MD; Attending Radiologist  This document has been electronically signed. Aug 23 2023  2:25PM (08-23-23 @ 14:14)      CT  CT Chest w/ IV Cont:   ACC: [id number] EXAM:  CT CHEST IC   ORDERED BY: AILEEN NORMAN     ACC: [id number] EXAM:  CT ABDOMEN AND PELVIS IC   ORDERED BY: AILEEN NORMAN     PROCEDURE DATE:  08/23/2023          INTERPRETATION:  REASON FOR EXAM / CLINICAL STATEMENT:  Fever and nausea.   WBC 12.19   PMHx of CHF, HTN, HLD, CAD, CKD3, CVA      TECHNIQUE:  Contiguous axial CT images were obtained from the thoracic   inlet through the pubic symphysis with IV contrast. IV contrast was   employed (Omnipaque 350); 95 ml was administered and 5 ml was discarded.   Reformatted images in the coronal and sagittal planes were acquired.      COMPARISON CT: CT scan of the chest, abdomen and pelvis dated 1/19/2023.    OTHER STUDIES USED FOR CORRELATION: None.      FINDINGS / CHEST:    TUBES AND LINES: None.    HEART AND VESSELS: The heart size is at the upper limits of normal.   Coronary artery calcifications are noted.  There is no pericardial   effusion.    There is no evidence of central pulmonary embolism.    Normal caliber aorta and pulmonary artery. Aortic calcifications are   noted. There is no evidence of aortic aneurysm or dissection.    Brachiocephalic vessels are unremarkable.    MEDIASTINUM: There are no suspicious mediastinal, hilar or axillary lymph   nodes. Bilateral calcified hilar lymph nodes. Stable right   paratracheal/esophageal diverticulum.  The visualized portion of the thyroid gland is unremarkable.    AIRWAYS, LUNGS AND PLEURA: The central tracheobronchial tree is patent.   Stable fibrotic changes are again noted in the right upper lobe.   Unchanged focal area of consolidation/atelectasis at the left lung base.   Small right pleural effusion. Bilateral calcified pleural plaques.   Calcified granulomas are noted within the left upper lobe. Thereis no   pneumothorax.    BONES AND SOFT TISSUES: No acute osseous abnormalities.      FINDINGS / ABDOMEN AND PELVIS:      HEPATIC: The liver is normal in size with mild lobulated contour. Early   cirrhosis should be considered. No evidence of solid mass or bile duct   dilatation. Mild hepatic steatosis is noted. The portal vein is patent.    BILIARY: Cholelithiasis    SPLEEN: Unremarkable.    PANCREAS: The pancreas is normal in size and configuration. No evidence   of mass or pancreatitis.    ADRENAL GLANDS: Unremarkable.    KIDNEYS: Left renal atrophy is noted. No evidence of hydronephrosis,   calcified stones, or solid mass. Multiple bilateral renal cysts and   subcentimeter hypodense cortical lesions too small to characterize.    ABDOMINOPELVIC NODES: Unremarkable.    PELVIC ORGANS: No evidence of pelvic mass, lymphadenopathy, or fluid   collection.    BLADDER: Unremarkable.    PERITONEUM/MESENTERY/BOWEL: Colonic diverticula noted with no evidence of   diverticulitis. No evidence of bowel obstruction, colitis, inflammatory   process, or ascites. No pneumoperitoneum.  The appendix is normal in   appearance.    BONES/SOFT TISSUES: No acute osseous abnormalities.    OTHER: Aortoiliac and visceral artery calcifications are noted with no   evidence of abdominal aortic aneurysm. Dense calcifications with stenosis   are noted at the origins of the left renal and celiac arteries.      IMPRESSION:    No evidence of acute abdominal or pelvic inflammatory process.    Mild lobulated contour of the liver. Early cirrhosis should be considered.    --- End of Report ---            ELIER VIZCAINO MD; Attending Interventional Radiologist  This document has been electronically signed. Aug 23 2023  5:54PM (08-23-23 @ 16:55)      CARDIOLOGY TESTING  12 Lead ECG:   Ventricular Rate 119 BPM    Atrial Rate 119 BPM    P-R Interval 118 ms    QRS Duration 82 ms    Q-T Interval 304 ms    QTC Calculation(Bazett) 427 ms    P Axis 47 degrees    R Axis -51 degrees    T Axis 42 degrees    Diagnosis Line Sinus tachycardia  Left anterior fascicular block  Abnormal ECG    Confirmed by Dallas Alcazar ([id number]) on 8/23/2023 1:55:46 PM (08-23-23 @ 12:57)      MEDICATIONS  budesonide 160 MICROgram(s)/formoterol 4.5 MICROgram(s) Inhaler 2 Inhalation two times a day  cefepime   IVPB 1000 IV Intermittent every 24 hours  chlorhexidine 2% Cloths 1 Topical <User Schedule>  cloNIDine Patch 0.2 mG/24Hr(s) 1 Transdermal every 7 days  cyanocobalamin 1000 Oral daily  heparin   Injectable 5000 SubCutaneous every 12 hours  ketotifen 0.025% Ophthalmic Solution 1 Both EYES daily  multivitamin 1 Oral daily  pantoprazole    Tablet 40 Oral before breakfast  polyethylene glycol 3350 17 Oral two times a day  senna 2 Oral at bedtime  sodium bicarbonate  Infusion 0.094 IV Continuous <Continuous>  tamsulosin 0.4 Oral at bedtime  vancomycin  IVPB 750 IV Intermittent every 24 hours        ANTIBIOTICS:  cefepime   IVPB 1000 milliGRAM(s) IV Intermittent every 24 hours  vancomycin  IVPB 750 milliGRAM(s) IV Intermittent every 24 hours      ALLERGIES:  No Known Allergies           RITIKA VALERO  77y, Male  Allergy: No Known Allergies      CHIEF COMPLAINT:       LOS  1d    HPI  HPI:  78 yo Cantonese speaking  male ( used) with a pmh of CHF unknown ef, hypertension, hyperlipidemia, CAD, CKD, CVA 8 years ago, history of GI bleed per wife??? presenting for c/o n/v/subjective fevers that started this morning. pt has experienced 5+ episodes of NBNB emesis and abdominal and chest pain. + constipation. Denies diarrhea, sore throat, runny nose. reports worsening difficulty urinating over past day.    -temp 101, hr 110, bp 110/60  -ECG:Sinus tachycardia, Left anterior fascicular block  -RUQ US:  Cholelithiasis without sonographic evidence of acute cholecystitis.  Echogenic gallbladder focus measuring up to 7 mm, difficult to   differentiate between stone and polyp. Recommend sonographic follow-up.  Nodular contour of liver as seen on CT, suggestive of cirrhosis.  -CT chest/abd/pelv:  No evidence of acute abdominal or pelvic inflammatory process.  Mild lobulated contour of the liver. Early cirrhosis should be considered.  -CXR: Irregular right apical pleural thickening redemonstrated, appears   increased compared to prior. Somewhat patchy right upper lobe opacities   also noted. Right pleural effusion. Recommend chest CT evaluation.  wbc 16.59, hg 9.8, mcv 95.8, k 6, cl 111, co2 14, bun 35, cr 2.3, br 2.2, br direct 2,alk phos 158, ast 381, alt 319, lipase 66, lactate 2.7 improved to 1.2, trop negative.  - ABG ph 7.28, pco2 30, hco3 14    received vanc / cefepime and 2.25 L bolus in ED (24 Aug 2023 01:08)      INFECTIOUS DISEASE HISTORY:  ID consulted for fever and sepsis   phone [phone], grandson at bedside also translated  Reports 2 days of feeling unwell  Some chills, +n/v,  fever, muscle aches, mild sore throat, no cough, dysuria, diarrhea  Does not spend a lot of time outside    From [place], here for 5 years  Unknown hx of TB nor hepatitis    Currently ordered for:  cefepime   IVPB 1000 milliGRAM(s) IV Intermittent every 24 hours  vancomycin  IVPB 750 milliGRAM(s) IV Intermittent every 24 hours      PMH  PAST MEDICAL & SURGICAL HISTORY:  HTN (hypertension)      CAD (coronary artery disease)      Acute CHF      Cerebrovascular accident (CVA)      Chronic kidney disease, unspecified CKD stage          FAMILY HISTORY  non-contributory     SOCIAL HISTORY  Social History:  denies tobacco, etoh or illicit drug use (19 Jan 2023 15:56)        ROS  General: as noted above   HEENT: as noted above   CV: Denies CP, palpitations  PULM: Denies wheezing, hemoptysis  GI: as noted above   : Denies discharge, hematuria  MSK: Denies arthralgias, myalgias  SKIN: Denies rash, lesions  NEURO: Denies paresthesias, weakness  PSYCH: Denies depression, anxiety     VITALS:  T(F): 98.6, Max: 101 (08-23-23 @ 11:36)  HR: 86  BP: 106/68  RR: 18Vital Signs Last 24 Hrs  T(C): 37 (23 Aug 2023 21:18), Max: 38.3 (23 Aug 2023 11:36)  T(F): 98.6 (23 Aug 2023 21:18), Max: 101 (23 Aug 2023 11:36)  HR: 86 (23 Aug 2023 21:18) (86 - 110)  BP: 106/68 (23 Aug 2023 21:18) (106/68 - 125/59)  BP(mean): --  RR: 18 (23 Aug 2023 21:18) (18 - 18)  SpO2: 97% (23 Aug 2023 21:18) (96% - 99%)    Parameters below as of 23 Aug 2023 21:18  Patient On (Oxygen Delivery Method): room air        PHYSICAL EXAM:  Gen: NAD, resting in bed  HEENT: Normocephalic, atraumatic, no pharyngeal exudate  Neck: supple, no lymphadenopathy  CV: Regular rate & regular rhythm  Lungs: decreased BS at bases, no fremitus  Abdomen: Soft, BS present, no tenderness to palpation   Ext: Warm, well perfused  Neuro: non focal, awake  Skin: no rash, no erythema  Lines: no phlebitis     TESTS & MEASUREMENTS:                        9.8    16.59 )-----------( 85       ( 23 Aug 2023 23:09 )             29.8     08-23    135  |  111<H>  |  35<H>  ----------------------------<  109<H>  6.0<HH>   |  14<L>  |  2.3<H>    Ca    7.8<L>      23 Aug 2023 23:09  Mg     1.9     08-23    TPro  5.7<L>  /  Alb  3.3<L>  /  TBili  2.2<H>  /  DBili  2.0<H>  /  AST  381<H>  /  ALT  319<H>  /  AlkPhos  158<H>  08-23      LIVER FUNCTIONS - ( 23 Aug 2023 23:09 )  Alb: 3.3 g/dL / Pro: 5.7 g/dL / ALK PHOS: 158 U/L / ALT: 319 U/L / AST: 381 U/L / GGT: x           Urinalysis Basic - ( 23 Aug 2023 23:09 )    Color: x / Appearance: x / SG: x / pH: x  Gluc: 109 mg/dL / Ketone: x  / Bili: x / Urobili: x   Blood: x / Protein: x / Nitrite: x   Leuk Esterase: x / RBC: x / WBC x   Sq Epi: x / Non Sq Epi: x / Bacteria: x        Culture - Blood (collected 01-20-23 @ 06:06)  Source: .Blood None  Final Report (01-25-23 @ 17:00):    No Growth Final    Culture - Blood (collected 01-19-23 @ 21:15)  Source: .Blood Blood  Final Report (01-25-23 @ 02:01):    No Growth Final    Culture - Urine (collected 01-19-23 @ 18:18)  Source: Catheterized Catheterized  Final Report (01-21-23 @ 04:01):    No growth        Lactate, Blood: 1.2 mmol/L (08-23-23 @ 23:09)  Lactate, Blood: 2.7 mmol/L (08-23-23 @ 13:26)      INFECTIOUS DISEASES TESTING  Procalcitonin, Serum: 0.07 ng/mL (01-19-23 @ 21:15)  COVID-19 PCR: Detected (01-19-23 @ 12:00)      INFLAMMATORY MARKERS      RADIOLOGY & ADDITIONAL TESTS:  I have personally reviewed the last Chest xray  CXR  Xray Chest 1 View- PORTABLE-Urgent:   ACC: [id number] EXAM:  XR CHEST PORTABLE URGENT 1V   ORDERED BY: AILEEN NORMAN     PROCEDURE DATE:  08/23/2023          INTERPRETATION:  Clinical History / Reason for exam: Fever    Comparison : Chest radiograph 1/20/2023.    Technique/Positioning: Frontal chest radiograph.    Findings:    Support devices: None.    Cardiac/mediastinum/hilum: Atherosclerotic aorta    Lung parenchyma/Pleura: Irregular right apical pleural thickening   redemonstrated, appears increased compared to prior. Somewhatpatchy   right upper lobe opacities also noted. Right pleural effusion. No   pneumothorax.    Skeleton/soft tissues: Bony demineralization. Degenerative changes.    Impression:    Irregular right apical pleural thickening redemonstrated, appears   increased compared to prior. Somewhat patchy right upper lobe opacities   also noted. Right pleural effusion. Recommend chest CT evaluation.    --- End of Report ---            RO VILLA MD; Attending Radiologist  This document has been electronically signed. Aug 23 2023  2:25PM (08-23-23 @ 14:14)      CT  CT Chest w/ IV Cont:   ACC: [id number] EXAM:  CT CHEST IC   ORDERED BY: AILEEN NORMAN     ACC: [id number] EXAM:  CT ABDOMEN AND PELVIS IC   ORDERED BY: AILEEN NORMAN     PROCEDURE DATE:  08/23/2023          INTERPRETATION:  REASON FOR EXAM / CLINICAL STATEMENT:  Fever and nausea.   WBC 12.19   PMHx of CHF, HTN, HLD, CAD, CKD3, CVA      TECHNIQUE:  Contiguous axial CT images were obtained from the thoracic   inlet through the pubic symphysis with IV contrast. IV contrast was   employed (Omnipaque 350); 95 ml was administered and 5 ml was discarded.   Reformatted images in the coronal and sagittal planes were acquired.      COMPARISON CT: CT scan of the chest, abdomen and pelvis dated 1/19/2023.    OTHER STUDIES USED FOR CORRELATION: None.      FINDINGS / CHEST:    TUBES AND LINES: None.    HEART AND VESSELS: The heart size is at the upper limits of normal.   Coronary artery calcifications are noted.  There is no pericardial   effusion.    There is no evidence of central pulmonary embolism.    Normal caliber aorta and pulmonary artery. Aortic calcifications are   noted. There is no evidence of aortic aneurysm or dissection.    Brachiocephalic vessels are unremarkable.    MEDIASTINUM: There are no suspicious mediastinal, hilar or axillary lymph   nodes. Bilateral calcified hilar lymph nodes. Stable right   paratracheal/esophageal diverticulum.  The visualized portion of the thyroid gland is unremarkable.    AIRWAYS, LUNGS AND PLEURA: The central tracheobronchial tree is patent.   Stable fibrotic changes are again noted in the right upper lobe.   Unchanged focal area of consolidation/atelectasis at the left lung base.   Small right pleural effusion. Bilateral calcified pleural plaques.   Calcified granulomas are noted within the left upper lobe. Thereis no   pneumothorax.    BONES AND SOFT TISSUES: No acute osseous abnormalities.      FINDINGS / ABDOMEN AND PELVIS:      HEPATIC: The liver is normal in size with mild lobulated contour. Early   cirrhosis should be considered. No evidence of solid mass or bile duct   dilatation. Mild hepatic steatosis is noted. The portal vein is patent.    BILIARY: Cholelithiasis    SPLEEN: Unremarkable.    PANCREAS: The pancreas is normal in size and configuration. No evidence   of mass or pancreatitis.    ADRENAL GLANDS: Unremarkable.    KIDNEYS: Left renal atrophy is noted. No evidence of hydronephrosis,   calcified stones, or solid mass. Multiple bilateral renal cysts and   subcentimeter hypodense cortical lesions too small to characterize.    ABDOMINOPELVIC NODES: Unremarkable.    PELVIC ORGANS: No evidence of pelvic mass, lymphadenopathy, or fluid   collection.    BLADDER: Unremarkable.    PERITONEUM/MESENTERY/BOWEL: Colonic diverticula noted with no evidence of   diverticulitis. No evidence of bowel obstruction, colitis, inflammatory   process, or ascites. No pneumoperitoneum.  The appendix is normal in   appearance.    BONES/SOFT TISSUES: No acute osseous abnormalities.    OTHER: Aortoiliac and visceral artery calcifications are noted with no   evidence of abdominal aortic aneurysm. Dense calcifications with stenosis   are noted at the origins of the left renal and celiac arteries.      IMPRESSION:    No evidence of acute abdominal or pelvic inflammatory process.    Mild lobulated contour of the liver. Early cirrhosis should be considered.    --- End of Report ---            ELIER VIZCAINO MD; Attending Interventional Radiologist  This document has been electronically signed. Aug 23 2023  5:54PM (08-23-23 @ 16:55)      CARDIOLOGY TESTING  12 Lead ECG:   Ventricular Rate 119 BPM    Atrial Rate 119 BPM    P-R Interval 118 ms    QRS Duration 82 ms    Q-T Interval 304 ms    QTC Calculation(Bazett) 427 ms    P Axis 47 degrees    R Axis -51 degrees    T Axis 42 degrees    Diagnosis Line Sinus tachycardia  Left anterior fascicular block  Abnormal ECG    Confirmed by Dallas Alcazar ([id number]) on 8/23/2023 1:55:46 PM (08-23-23 @ 12:57)      MEDICATIONS  budesonide 160 MICROgram(s)/formoterol 4.5 MICROgram(s) Inhaler 2 Inhalation two times a day  cefepime   IVPB 1000 IV Intermittent every 24 hours  chlorhexidine 2% Cloths 1 Topical <User Schedule>  cloNIDine Patch 0.2 mG/24Hr(s) 1 Transdermal every 7 days  cyanocobalamin 1000 Oral daily  heparin   Injectable 5000 SubCutaneous every 12 hours  ketotifen 0.025% Ophthalmic Solution 1 Both EYES daily  multivitamin 1 Oral daily  pantoprazole    Tablet 40 Oral before breakfast  polyethylene glycol 3350 17 Oral two times a day  senna 2 Oral at bedtime  sodium bicarbonate  Infusion 0.094 IV Continuous <Continuous>  tamsulosin 0.4 Oral at bedtime  vancomycin  IVPB 750 IV Intermittent every 24 hours        ANTIBIOTICS:  cefepime   IVPB 1000 milliGRAM(s) IV Intermittent every 24 hours  vancomycin  IVPB 750 milliGRAM(s) IV Intermittent every 24 hours      ALLERGIES:  No Known Allergies

## 2023-08-25 ENCOUNTER — TRANSCRIPTION ENCOUNTER (OUTPATIENT)
Age: 78
End: 2023-08-25

## 2023-08-25 LAB
ALBUMIN SERPL ELPH-MCNC: 3.2 G/DL — LOW (ref 3.5–5.2)
ALP SERPL-CCNC: 180 U/L — HIGH (ref 30–115)
ALT FLD-CCNC: 173 U/L — HIGH (ref 0–41)
ANION GAP SERPL CALC-SCNC: 11 MMOL/L — SIGNIFICANT CHANGE UP (ref 7–14)
AST SERPL-CCNC: 109 U/L — HIGH (ref 0–41)
BASE EXCESS BLDA CALC-SCNC: -4.5 MMOL/L — LOW (ref -2–3)
BASOPHILS # BLD AUTO: 0.02 K/UL — SIGNIFICANT CHANGE UP (ref 0–0.2)
BASOPHILS NFR BLD AUTO: 0.3 % — SIGNIFICANT CHANGE UP (ref 0–1)
BILIRUB SERPL-MCNC: 2.6 MG/DL — HIGH (ref 0.2–1.2)
BUN SERPL-MCNC: 32 MG/DL — HIGH (ref 10–20)
CALCIUM SERPL-MCNC: 8.4 MG/DL — SIGNIFICANT CHANGE UP (ref 8.4–10.5)
CHLORIDE SERPL-SCNC: 108 MMOL/L — SIGNIFICANT CHANGE UP (ref 98–110)
CK SERPL-CCNC: 177 U/L — SIGNIFICANT CHANGE UP (ref 0–225)
CO2 SERPL-SCNC: 18 MMOL/L — SIGNIFICANT CHANGE UP (ref 17–32)
CREAT SERPL-MCNC: 2.1 MG/DL — HIGH (ref 0.7–1.5)
EGFR: 32 ML/MIN/1.73M2 — LOW
EOSINOPHIL # BLD AUTO: 0.07 K/UL — SIGNIFICANT CHANGE UP (ref 0–0.7)
EOSINOPHIL NFR BLD AUTO: 1.2 % — SIGNIFICANT CHANGE UP (ref 0–8)
GLUCOSE BLDC GLUCOMTR-MCNC: 105 MG/DL — HIGH (ref 70–99)
GLUCOSE BLDC GLUCOMTR-MCNC: 111 MG/DL — HIGH (ref 70–99)
GLUCOSE BLDC GLUCOMTR-MCNC: 98 MG/DL — SIGNIFICANT CHANGE UP (ref 70–99)
GLUCOSE SERPL-MCNC: 107 MG/DL — HIGH (ref 70–99)
HAV IGM SER-ACNC: SIGNIFICANT CHANGE UP
HBV CORE IGM SER-ACNC: SIGNIFICANT CHANGE UP
HBV SURFACE AG SER-ACNC: SIGNIFICANT CHANGE UP
HCO3 BLDA-SCNC: 20 MMOL/L — LOW (ref 21–28)
HCT VFR BLD CALC: 30.2 % — LOW (ref 42–52)
HCV AB S/CO SERPL IA: 0.07 S/CO — SIGNIFICANT CHANGE UP (ref 0–0.99)
HCV AB SERPL-IMP: SIGNIFICANT CHANGE UP
HGB BLD-MCNC: 10.1 G/DL — LOW (ref 14–18)
HIV 1+2 AB+HIV1 P24 AG SERPL QL IA: SIGNIFICANT CHANGE UP
IMM GRANULOCYTES NFR BLD AUTO: 0.3 % — SIGNIFICANT CHANGE UP (ref 0.1–0.3)
LDH SERPL L TO P-CCNC: 181 U/L — SIGNIFICANT CHANGE UP (ref 50–242)
LDH SERPL L TO P-CCNC: 423 U/L — HIGH (ref 50–242)
LYMPHOCYTES # BLD AUTO: 0.4 K/UL — LOW (ref 1.2–3.4)
LYMPHOCYTES # BLD AUTO: 7 % — LOW (ref 20.5–51.1)
MAGNESIUM SERPL-MCNC: 1.9 MG/DL — SIGNIFICANT CHANGE UP (ref 1.8–2.4)
MCHC RBC-ENTMCNC: 31 PG — SIGNIFICANT CHANGE UP (ref 27–31)
MCHC RBC-ENTMCNC: 33.4 G/DL — SIGNIFICANT CHANGE UP (ref 32–37)
MCV RBC AUTO: 92.6 FL — SIGNIFICANT CHANGE UP (ref 80–94)
MONOCYTES # BLD AUTO: 0.43 K/UL — SIGNIFICANT CHANGE UP (ref 0.1–0.6)
MONOCYTES NFR BLD AUTO: 7.5 % — SIGNIFICANT CHANGE UP (ref 1.7–9.3)
NEUTROPHILS # BLD AUTO: 4.79 K/UL — SIGNIFICANT CHANGE UP (ref 1.4–6.5)
NEUTROPHILS NFR BLD AUTO: 83.7 % — HIGH (ref 42.2–75.2)
NRBC # BLD: 0 /100 WBCS — SIGNIFICANT CHANGE UP (ref 0–0)
PCO2 BLDA: 36 MMHG — SIGNIFICANT CHANGE UP (ref 35–48)
PH BLDA: 7.36 — SIGNIFICANT CHANGE UP (ref 7.35–7.45)
PLATELET # BLD AUTO: 73 K/UL — LOW (ref 130–400)
PMV BLD: 11.5 FL — HIGH (ref 7.4–10.4)
PO2 BLDA: 91 MMHG — SIGNIFICANT CHANGE UP (ref 83–108)
POTASSIUM SERPL-MCNC: 4.3 MMOL/L — SIGNIFICANT CHANGE UP (ref 3.5–5)
POTASSIUM SERPL-SCNC: 4.3 MMOL/L — SIGNIFICANT CHANGE UP (ref 3.5–5)
PROCALCITONIN SERPL-MCNC: 19 NG/ML — HIGH (ref 0.02–0.1)
PROT SERPL-MCNC: 5.6 G/DL — LOW (ref 6–8)
RAPID RVP RESULT: SIGNIFICANT CHANGE UP
RBC # BLD: 3.26 M/UL — LOW (ref 4.7–6.1)
RBC # FLD: 14.5 % — SIGNIFICANT CHANGE UP (ref 11.5–14.5)
SAO2 % BLDA: 98.7 % — HIGH (ref 94–98)
SARS-COV-2 RNA SPEC QL NAA+PROBE: SIGNIFICANT CHANGE UP
SODIUM SERPL-SCNC: 137 MMOL/L — SIGNIFICANT CHANGE UP (ref 135–146)
VANCOMYCIN FLD-MCNC: 7.5 UG/ML — SIGNIFICANT CHANGE UP (ref 5–10)
WBC # BLD: 5.73 K/UL — SIGNIFICANT CHANGE UP (ref 4.8–10.8)
WBC # FLD AUTO: 5.73 K/UL — SIGNIFICANT CHANGE UP (ref 4.8–10.8)

## 2023-08-25 PROCEDURE — 99233 SBSQ HOSP IP/OBS HIGH 50: CPT

## 2023-08-25 PROCEDURE — 74328 X-RAY BILE DUCT ENDOSCOPY: CPT | Mod: 26

## 2023-08-25 PROCEDURE — 71045 X-RAY EXAM CHEST 1 VIEW: CPT | Mod: 26

## 2023-08-25 PROCEDURE — 43264 ERCP REMOVE DUCT CALCULI: CPT | Mod: XU

## 2023-08-25 PROCEDURE — 43274 ERCP DUCT STENT PLACEMENT: CPT

## 2023-08-25 RX ORDER — SODIUM CHLORIDE 9 MG/ML
500 INJECTION, SOLUTION INTRAVENOUS ONCE
Refills: 0 | Status: COMPLETED | OUTPATIENT
Start: 2023-08-25 | End: 2023-08-25

## 2023-08-25 RX ORDER — SODIUM CHLORIDE 9 MG/ML
1000 INJECTION, SOLUTION INTRAVENOUS
Refills: 0 | Status: DISCONTINUED | OUTPATIENT
Start: 2023-08-25 | End: 2023-08-28

## 2023-08-25 RX ORDER — LABETALOL HCL 100 MG
100 TABLET ORAL ONCE
Refills: 0 | Status: COMPLETED | OUTPATIENT
Start: 2023-08-25 | End: 2023-08-25

## 2023-08-25 RX ORDER — SODIUM CHLORIDE 9 MG/ML
1000 INJECTION, SOLUTION INTRAVENOUS
Refills: 0 | Status: DISCONTINUED | OUTPATIENT
Start: 2023-08-25 | End: 2023-08-25

## 2023-08-25 RX ADMIN — PANTOPRAZOLE SODIUM 40 MILLIGRAM(S): 20 TABLET, DELAYED RELEASE ORAL at 06:15

## 2023-08-25 RX ADMIN — SODIUM CHLORIDE 75 MILLILITER(S): 9 INJECTION, SOLUTION INTRAVENOUS at 14:53

## 2023-08-25 RX ADMIN — BUDESONIDE AND FORMOTEROL FUMARATE DIHYDRATE 2 PUFF(S): 160; 4.5 AEROSOL RESPIRATORY (INHALATION) at 22:19

## 2023-08-25 RX ADMIN — SODIUM CHLORIDE 75 MILLILITER(S): 9 INJECTION, SOLUTION INTRAVENOUS at 22:54

## 2023-08-25 RX ADMIN — Medication 1 PATCH: at 19:00

## 2023-08-25 RX ADMIN — Medication 1 PATCH: at 07:45

## 2023-08-25 RX ADMIN — HEPARIN SODIUM 5000 UNIT(S): 5000 INJECTION INTRAVENOUS; SUBCUTANEOUS at 06:14

## 2023-08-25 RX ADMIN — CEFEPIME 100 MILLIGRAM(S): 1 INJECTION, POWDER, FOR SOLUTION INTRAMUSCULAR; INTRAVENOUS at 22:47

## 2023-08-25 RX ADMIN — SENNA PLUS 2 TABLET(S): 8.6 TABLET ORAL at 22:54

## 2023-08-25 RX ADMIN — TAMSULOSIN HYDROCHLORIDE 0.4 MILLIGRAM(S): 0.4 CAPSULE ORAL at 22:53

## 2023-08-25 RX ADMIN — SODIUM CHLORIDE 75 MILLILITER(S): 9 INJECTION, SOLUTION INTRAVENOUS at 11:13

## 2023-08-25 RX ADMIN — Medication 1 TABLET(S): at 11:13

## 2023-08-25 RX ADMIN — SODIUM CHLORIDE 500 MILLILITER(S): 9 INJECTION, SOLUTION INTRAVENOUS at 19:38

## 2023-08-25 RX ADMIN — CHLORHEXIDINE GLUCONATE 1 APPLICATION(S): 213 SOLUTION TOPICAL at 06:09

## 2023-08-25 RX ADMIN — KETOTIFEN FUMARATE 1 DROP(S): 0.34 SOLUTION OPHTHALMIC at 11:13

## 2023-08-25 RX ADMIN — PREGABALIN 1000 MICROGRAM(S): 225 CAPSULE ORAL at 11:13

## 2023-08-25 RX ADMIN — POLYETHYLENE GLYCOL 3350 17 GRAM(S): 17 POWDER, FOR SOLUTION ORAL at 06:15

## 2023-08-25 RX ADMIN — Medication 100 MILLIGRAM(S): at 23:00

## 2023-08-25 NOTE — PROGRESS NOTE ADULT - SUBJECTIVE AND OBJECTIVE BOX
Patient is a 77y old  Male who presents with a chief complaint of fevers (25 Aug 2023 08:44)      Patient seen and examined at bedside.  Patient denies any chest pain or shortness of breath   ALLERGIES:  No Known Allergies    MEDICATIONS:  budesonide 160 MICROgram(s)/formoterol 4.5 MICROgram(s) Inhaler 2 Puff(s) Inhalation two times a day  cefepime   IVPB 1000 milliGRAM(s) IV Intermittent every 24 hours  chlorhexidine 2% Cloths 1 Application(s) Topical <User Schedule>  cloNIDine Patch 0.2 mG/24Hr(s) 1 patch Transdermal every 7 days  cyanocobalamin 1000 MICROGram(s) Oral daily  heparin   Injectable 5000 Unit(s) SubCutaneous every 12 hours  ketotifen 0.025% Ophthalmic Solution 1 Drop(s) Both EYES daily  lactated ringers. 1000 milliLiter(s) IV Continuous <Continuous>  multivitamin 1 Tablet(s) Oral daily  ondansetron Injectable 4 milliGRAM(s) IV Push every 8 hours PRN  pantoprazole    Tablet 40 milliGRAM(s) Oral before breakfast  polyethylene glycol 3350 17 Gram(s) Oral two times a day  senna 2 Tablet(s) Oral at bedtime  tamsulosin 0.4 milliGRAM(s) Oral at bedtime    Vital Signs Last 24 Hrs  T(F): 99 (25 Aug 2023 17:05), Max: 99 (25 Aug 2023 17:05)  HR: 86 (25 Aug 2023 17:07) (59 - 89)  BP: 184/79 (25 Aug 2023 17:07) (147/75 - 205/84)  RR: 18 (25 Aug 2023 17:07) (17 - 18)  SpO2: 96% (25 Aug 2023 17:07) (96% - 96%)  I&O's Summary      PHYSICAL EXAM:  General: NAD, A/O x 3  ENT: MMM  Neck: Supple, No JVD  Lungs: right sided basal and mid crackle   Cardio: RRR, S1/S2, No murmurs  Abdomen: Soft, Nontender, Nondistended; Bowel sounds present  Extremities: No cyanosis, No edema    LABS:                        10.1   5.73  )-----------( 73       ( 25 Aug 2023 07:29 )             30.2     08-25    137  |  108  |  32  ----------------------------<  107  4.3   |  18  |  2.1    Ca    8.4      25 Aug 2023 07:29  Mg     1.9     08-25    TPro  5.6  /  Alb  3.2  /  TBili  2.6  /  DBili  x   /  AST  109  /  ALT  173  /  AlkPhos  180  08-25        Lactate, Blood: 1.2 mmol/L (08-23 @ 23:09)  Lactate, Blood: 2.7 mmol/L (08-23 @ 13:26)    CARDIAC MARKERS ( 25 Aug 2023 07:29 )  x     / x     / 177 U/L / x     / x                ABG - ( 25 Aug 2023 11:34 )  pH, Arterial: 7.36  pH, Blood: x     /  pCO2: 36    /  pO2: 91    / HCO3: 20    / Base Excess: -4.5  /  SaO2: 98.7                    POCT Blood Glucose.: 105 mg/dL (25 Aug 2023 10:55)  POCT Blood Glucose.: 98 mg/dL (25 Aug 2023 07:19)  POCT Blood Glucose.: 125 mg/dL (24 Aug 2023 21:14)      Urinalysis Basic - ( 25 Aug 2023 07:29 )    Color: x / Appearance: x / SG: x / pH: x  Gluc: 107 mg/dL / Ketone: x  / Bili: x / Urobili: x   Blood: x / Protein: x / Nitrite: x   Leuk Esterase: x / RBC: x / WBC x   Sq Epi: x / Non Sq Epi: x / Bacteria: x        Culture - Urine (collected 23 Aug 2023 21:05)  Source: Clean Catch Clean Catch (Midstream)  Final Report (24 Aug 2023 22:19):    <10,000 CFU/mL Normal Urogenital Janny    Culture - Blood (collected 23 Aug 2023 19:49)  Source: .Blood Blood  Preliminary Report (25 Aug 2023 02:02):    No growth at 24 hours    Culture - Blood (collected 23 Aug 2023 19:49)  Source: .Blood Blood  Preliminary Report (25 Aug 2023 02:02):    No growth at 24 hours          RADIOLOGY & ADDITIONAL TESTS:  < from: US Abdomen Upper Quadrant Right (08.23.23 @ 19:50) >  IMPRESSION:    Cholelithiasis without sonographic evidence of acute cholecystitis.    Echogenic gallbladder focus measuring up to 7 mm, difficult to   differentiate between stone and polyp. Recommend sonographic follow-up.    Nodular contour of liver as seen on CT, suggestive of cirrhosis.    < end of copied text >  < from: CT Chest w/ IV Cont (08.23.23 @ 16:55) >  IMPRESSION:    No evidence of acute abdominal or pelvic inflammatory process.    Mild lobulated contour of the liver. Early cirrhosis should be considered.    < end of copied text >  < from: CT Chest w/ IV Cont (08.23.23 @ 16:55) >  AIRWAYS, LUNGS AND PLEURA: The central tracheobronchial tree is patent.   Stable fibrotic changes are again noted in the right upper lobe.   Unchanged focal area of consolidation/atelectasis at the left lung base.   Small right pleural effusion. Bilateral calcified pleural plaques.   Calcified granulomas are noted within the left upper lobe. Thereis no   pneumothorax.    < end of copied text >    Care Discussed with Consultants/Other Providers:

## 2023-08-25 NOTE — PROGRESS NOTE ADULT - ASSESSMENT
# R/O Choledocholithiasis/ Cholangitis   - Sepsis admission   - RUQ US: Cholelithiasis without sonographic evidence of acute cholecystitis. Echogenic gallbladder focus measuring up to 7 mm, difficult to differentiate between stone and polyp.   - CT abd/pelv: No evidence of acute abdominal or pelvic inflammatory process.   - LIVER FUNCTIONS - ( 24 Aug 2023 13:30 )  Alb: 2.9 g/dL / Pro: 5.3 g/dL / ALK PHOS: 154 U/L / ALT: 226 U/L / AST: 189 U/L / GGT: x           PLAN   EUS +/- ERCP tomorrow  NPO after midnight   IV hydration   Abx per ID   Follow cultures   Will follow       # Liver Cirrhosis  CT A/P: Mild lobulated contour of the liver. Early cirrhosis should be considered.    PLAN   check hepatitis serology.   Follow up with hepatology as outpatient for frther workup and screeni 76 yo Cantonese speaking  male with a pmh of CHF unknown ef, hypertension, hyperlipidemia, CAD, CKD, CVA 8 years ago, history of GI bleed per wife??? presenting for c/o n/v/subjective fevers that started this morning. pt has experienced 5+ episodes of NBNB emesis and abdominal and chest pain. + constipation. Denies diarrhea, sore throat, runny nose. reports worsening difficulty urinating over past day.    # Sepsis on admission  # NAGMA  # R/O Choledocholithiasis/ Cholangitis   - RUQ US: Cholelithiasis without sonographic evidence of acute cholecystitis. Echogenic gallbladder focus measuring up to 7 mm, difficult to differentiate between stone and polyp.   - CT abd/pelv: No evidence of acute abdominal or pelvic inflammatory process.   - labs on admission 8/23: Alb: 2.9 g/dL / Pro: 5.3 g/dL / ALK PHOS: 154 U/L / ALT: 226 U/L / AST: 189 U/L / GGT: x  - s/p cefepime and vanco for broad coverage  - ID consulted, recs appreciated    - c/w iv cefepime q24h    - bcx ntd    - parasite smear prelim neg, f/u LDH    - HIV neg, Hep neg    - f/u RVP    - f/u quantiferon  - advance GI following, plan for EUS +/- ERCP today 8/25  - c/w IVF with bicarb, will check ABG pH if >7.1 can dc bicarb    # Early Liver Cirrhosis  - CT A/P: Mild lobulated contour of the liver. Early cirrhosis should be considered  - acute/chronic liver disease w/u  - Follow up with hepatology as outpatient    #CKD IIIb   - IVF with bicarb  - Cr 2.1    #H/o CAD/HF -stable   #HTN / Dyslipidemia   - hold statin / Edarbi/ Verquvo for now     DVT prophylaxis: hsq 78 yo Cantonese speaking  male with a pmh of CHF unknown ef, hypertension, hyperlipidemia, CAD, CKD, CVA 8 years ago, history of GI bleed per wife??? presenting for c/o n/v/subjective fevers that started this morning. pt has experienced 5+ episodes of NBNB emesis and abdominal and chest pain. + constipation. Denies diarrhea, sore throat, runny nose. reports worsening difficulty urinating over past day.    # Sepsis on admission  # NAGMA  # R/O Choledocholithiasis/ Cholangitis   - RUQ US: Cholelithiasis without sonographic evidence of acute cholecystitis. Echogenic gallbladder focus measuring up to 7 mm, difficult to differentiate between stone and polyp.   - CT abd/pelv: No evidence of acute abdominal or pelvic inflammatory process.   - labs on admission 8/23: Alb: 2.9 g/dL / Pro: 5.3 g/dL / ALK PHOS: 154 U/L / ALT: 226 U/L / AST: 189 U/L / GGT: x  - s/p cefepime and vanco for broad coverage  - ID consulted, recs appreciated    - c/w iv cefepime q24h    - bcx ntd    - parasite smear prelim neg, f/u LDH    - HIV neg, Hep neg    - f/u RVP    - f/u quantiferon  - advance GI following, plan for EUS +/- ERCP today 8/25  - s/p bicarb drip, today pH 7.35, dc bicarb and start LR    # Early Liver Cirrhosis  - CT A/P: Mild lobulated contour of the liver. Early cirrhosis should be considered  - acute/chronic liver disease w/u  - Follow up with hepatology as outpatient    #CKD IIIb   - IVF with bicarb  - Cr 2.1    #H/o CAD/HF -stable   #HTN / Dyslipidemia   - hold statin / Edarbi/ Verquvo for now     DVT prophylaxis: hsq

## 2023-08-25 NOTE — CHART NOTE - NSCHARTNOTEFT_GEN_A_CORE
PACU ANESTHESIA ADMISSION NOTE      Procedure:   Post op diagnosis:      ____  Intubated  TV:______       Rate: ______      FiO2: ___face mask 2 liters__    __x__  Patent Airway    __x__  Full return of protective reflexes    __x__  Full recovery from anesthesia / back to baseline status    Vitals:  T(C): 37.2 (08-25-23 @ 17:07), Max: 37.2 (08-25-23 @ 17:05)  HR: 86 (08-25-23 @ 17:07) (59 - 89)  BP: 184/79 (08-25-23 @ 17:07) (147/75 - 205/84)  RR: 18 (08-25-23 @ 17:07) (17 - 18)  SpO2: 96% (08-25-23 @ 17:07) (96% - 96%)    Mental Status:  __x__ Awake   ___x__ Alert   __x___ Drowsy   _____ Sedated    Nausea/Vomiting:  __x__ NO  ______Yes,   See Post - Op Orders          Pain Scale (0-10):  __0___    Treatment: ____ None    __x__ See Post - Op/PCA Orders    Post - Operative Fluids:   ____ Oral   __x__ See Post - Op Orders    Plan: Discharge:   ____Home       ___x__Floor     _____Critical Care    _____  Other:_________________    Comments: Patient had smooth intraoperative event, no anesthesia complication.  PACU Vital signs: HR:     85       BP:    166    /  78        RR:     18        O2 Sat:   100    %     Temp 97.7

## 2023-08-25 NOTE — PROGRESS NOTE ADULT - SUBJECTIVE AND OBJECTIVE BOX
SUBJECTIVE:    Patient is a 77y old Male who presents with a chief complaint of fever and emesis.     Today is hospital day 2d. NAEON. This morning patient has no acute complaints. NPO for ERCP today.    PAST MEDICAL & SURGICAL HISTORY  HTN (hypertension)    CAD (coronary artery disease)    Acute CHF    Cerebrovascular accident (CVA)    Chronic kidney disease, unspecified CKD stage      SOCIAL HISTORY:  Negative for smoking/alcohol/drug use.     ALLERGIES:  No Known Allergies    MEDICATIONS:  STANDING MEDICATIONS  budesonide 160 MICROgram(s)/formoterol 4.5 MICROgram(s) Inhaler 2 Puff(s) Inhalation two times a day  cefepime   IVPB 1000 milliGRAM(s) IV Intermittent every 24 hours  chlorhexidine 2% Cloths 1 Application(s) Topical <User Schedule>  cloNIDine Patch 0.2 mG/24Hr(s) 1 patch Transdermal every 7 days  cyanocobalamin 1000 MICROGram(s) Oral daily  dextrose 5% 1000 milliLiter(s) IV Continuous <Continuous>  heparin   Injectable 5000 Unit(s) SubCutaneous every 12 hours  ketotifen 0.025% Ophthalmic Solution 1 Drop(s) Both EYES daily  multivitamin 1 Tablet(s) Oral daily  pantoprazole    Tablet 40 milliGRAM(s) Oral before breakfast  polyethylene glycol 3350 17 Gram(s) Oral two times a day  senna 2 Tablet(s) Oral at bedtime  tamsulosin 0.4 milliGRAM(s) Oral at bedtime    PRN MEDICATIONS  ondansetron Injectable 4 milliGRAM(s) IV Push every 8 hours PRN    VITALS:   T(F): 97.7  HR: 69  BP: 147/75  RR: 18  SpO2: 96%    LABS:                        10.1   5.73  )-----------( 73       ( 25 Aug 2023 07:29 )             30.2     08-24    137  |  109  |  36<H>  ----------------------------<  117<H>  4.9   |  18  |  2.3<H>    Ca    7.9<L>      24 Aug 2023 20:00  Mg     1.9     08-24    TPro  5.0<L>  /  Alb  2.7<L>  /  TBili  2.2<H>  /  DBili  x   /  AST  138<H>  /  ALT  184<H>  /  AlkPhos  152<H>  08-24      Urinalysis Basic - ( 24 Aug 2023 20:00 )    Color: x / Appearance: x / SG: x / pH: x  Gluc: 117 mg/dL / Ketone: x  / Bili: x / Urobili: x   Blood: x / Protein: x / Nitrite: x   Leuk Esterase: x / RBC: x / WBC x   Sq Epi: x / Non Sq Epi: x / Bacteria: x      ABG - ( 24 Aug 2023 00:23 )  pH, Arterial: 7.28  pH, Blood: x     /  pCO2: 30    /  pO2: 100   / HCO3: 14    / Base Excess: -11.5 /  SaO2: 100.0       Culture - Urine (collected 23 Aug 2023 21:05)  Source: Clean Catch Clean Catch (Midstream)  Final Report (24 Aug 2023 22:19):    <10,000 CFU/mL Normal Urogenital Janny    Culture - Blood (collected 23 Aug 2023 19:49)  Source: .Blood Blood  Preliminary Report (25 Aug 2023 02:02):    No growth at 24 hours    Culture - Blood (collected 23 Aug 2023 19:49)  Source: .Blood Blood  Preliminary Report (25 Aug 2023 02:02):    No growth at 24 hours      CARDIAC MARKERS ( 23 Aug 2023 13:26 )  x     / <0.01 ng/mL / x     / x     / x          RADIOLOGY:    PHYSICAL EXAM:  GEN: No acute distress  LUNGS: Clear to auscultation bilaterally   HEART: irregular heart sounds  ABD: Soft, non-tender, non-distended.  EXT: NC/NC/NE/2+PP/PLUMMER/Skin Intact.   NEURO: AAOX3   SUBJECTIVE:    Patient is a 77y old Male who presents with a chief complaint of fever and emesis.     Today is hospital day 2d. NAEON. This morning patient has no acute complaints. Denied cp, sob, fever, chills, abd pain, nausea, vomiting, headache or dizziness. NPO for ERCP today.    PAST MEDICAL & SURGICAL HISTORY  HTN (hypertension)    CAD (coronary artery disease)    Acute CHF    Cerebrovascular accident (CVA)    Chronic kidney disease, unspecified CKD stage      SOCIAL HISTORY:  Negative for smoking/alcohol/drug use.     ALLERGIES:  No Known Allergies    MEDICATIONS:  STANDING MEDICATIONS  budesonide 160 MICROgram(s)/formoterol 4.5 MICROgram(s) Inhaler 2 Puff(s) Inhalation two times a day  cefepime   IVPB 1000 milliGRAM(s) IV Intermittent every 24 hours  chlorhexidine 2% Cloths 1 Application(s) Topical <User Schedule>  cloNIDine Patch 0.2 mG/24Hr(s) 1 patch Transdermal every 7 days  cyanocobalamin 1000 MICROGram(s) Oral daily  dextrose 5% 1000 milliLiter(s) IV Continuous <Continuous>  heparin   Injectable 5000 Unit(s) SubCutaneous every 12 hours  ketotifen 0.025% Ophthalmic Solution 1 Drop(s) Both EYES daily  multivitamin 1 Tablet(s) Oral daily  pantoprazole    Tablet 40 milliGRAM(s) Oral before breakfast  polyethylene glycol 3350 17 Gram(s) Oral two times a day  senna 2 Tablet(s) Oral at bedtime  tamsulosin 0.4 milliGRAM(s) Oral at bedtime    PRN MEDICATIONS  ondansetron Injectable 4 milliGRAM(s) IV Push every 8 hours PRN    VITALS:   T(F): 97.7  HR: 69  BP: 147/75  RR: 18  SpO2: 96%    LABS:                        10.1   5.73  )-----------( 73       ( 25 Aug 2023 07:29 )             30.2     08-24    137  |  109  |  36<H>  ----------------------------<  117<H>  4.9   |  18  |  2.3<H>    Ca    7.9<L>      24 Aug 2023 20:00  Mg     1.9     08-24    TPro  5.0<L>  /  Alb  2.7<L>  /  TBili  2.2<H>  /  DBili  x   /  AST  138<H>  /  ALT  184<H>  /  AlkPhos  152<H>  08-24      Urinalysis Basic - ( 24 Aug 2023 20:00 )    Color: x / Appearance: x / SG: x / pH: x  Gluc: 117 mg/dL / Ketone: x  / Bili: x / Urobili: x   Blood: x / Protein: x / Nitrite: x   Leuk Esterase: x / RBC: x / WBC x   Sq Epi: x / Non Sq Epi: x / Bacteria: x      ABG - ( 24 Aug 2023 00:23 )  pH, Arterial: 7.28  pH, Blood: x     /  pCO2: 30    /  pO2: 100   / HCO3: 14    / Base Excess: -11.5 /  SaO2: 100.0       Culture - Urine (collected 23 Aug 2023 21:05)  Source: Clean Catch Clean Catch (Midstream)  Final Report (24 Aug 2023 22:19):    <10,000 CFU/mL Normal Urogenital Janny    Culture - Blood (collected 23 Aug 2023 19:49)  Source: .Blood Blood  Preliminary Report (25 Aug 2023 02:02):    No growth at 24 hours    Culture - Blood (collected 23 Aug 2023 19:49)  Source: .Blood Blood  Preliminary Report (25 Aug 2023 02:02):    No growth at 24 hours      CARDIAC MARKERS ( 23 Aug 2023 13:26 )  x     / <0.01 ng/mL / x     / x     / x          RADIOLOGY:    PHYSICAL EXAM:  GEN: No acute distress  LUNGS: Clear to auscultation bilaterally   HEART: irregular heart sounds  ABD: Soft, non-tender, non-distended.  EXT: NC/NC/NE/2+PP/PLUMMER/Skin Intact.   NEURO: AAOX3

## 2023-08-25 NOTE — CHART NOTE - NSCHARTNOTEFT_GEN_A_CORE
Patient is s/p ERCP with biliary sphincterotomy.  A 10 F x 7 cm plastic CBD stent was placed with adequate drainage.  A 5 F x 4 cm plastic pancreatic stent was placed     REC:  Keep NPO  IV hydration   Clear liquid diet in am  Advance diet tomorrow if uneventful   Watch for post - ERCP pancreatitis   Follow up in office in 4 weeks for stents removal

## 2023-08-25 NOTE — PROGRESS NOTE ADULT - ASSESSMENT
ASSESSMENT  76 yo Cantonese speaking  male ( used) with a pmh of CHF unknown ef, hypertension, hyperlipidemia, CAD, CKD, CVA 8 years ago, history of GI bleed per wife??? presenting for c/o n/v/subjective fevers that started this morning. pt has experienced 5+ episodes of NBNB emesis and abdominal and chest pain. + constipation.    IMPRESSION  #Severe sepsis on admission T>101F, Pulse>90 WBC 16 lactic acidosis    8/23 Blood & Urine cxs NGTD   #Transaminitis  with elevated tbili  #Thrombocytopenia/ Anemia     parasite smear NEGATIVE     CXR Irregular right apical pleural thickening redemonstrated, appears   increased compared to prior. Somewhat patchy right upper lobe opacities   also noted. Right pleural effusion. Recommend chest CT evaluation.    CT No evidence of acute abdominal or pelvic inflammatory process.  Mild lobulated contour of the liver. Early cirrhosis should be considered.  CT Chest Stable fibrotic changes are again noted in the right upper lobe.   Unchanged focal area of consolidation/atelectasis at the left lung base.   Small right pleural effusion. Bilateral calcified pleural plaques.   Calcified granulomas are noted within the left upper lobe.  < from: US Abdomen Upper Quadrant Right (08.23.23 @ 19:50) >  Cholelithiasis without sonographic evidence of acute cholecystitis.  Echogenic gallbladder focus measuring up to 7 mm, difficult to   differentiate between stone and polyp. Recommend sonographic follow-up.  Nodular contour of liver as seen on CT, suggestive of cirrhosis.    Hep B/C NR   #Abx allergy: No Known Allergies  #KOBE  Creatinine: 2.3 (08-23-23 @ 23:09)  crcl 23  Height (cm): 162.6 (08-23-23 @ 11:36)  Weight (kg): 59.9 (08-23-23 @ 11:36)    RECOMMENDATIONS  - Cefepime 1g q24h IV   - RVP  - quantiferon gold   - LDH   - GI - pending EUS/ERCP    If any questions, please send a message or call on [facility] Teams  Please continue to update ID with any pertinent new laboratory or radiographic findings ASSESSMENT  76 yo Cantonese speaking  male ( used) with a pmh of CHF unknown ef, hypertension, hyperlipidemia, CAD, CKD, CVA 8 years ago, history of GI bleed per wife??? presenting for c/o n/v/subjective fevers that started this morning. pt has experienced 5+ episodes of NBNB emesis and abdominal and chest pain. + constipation.    IMPRESSION  #Severe sepsis on admission T>101F, Pulse>90 WBC 16 lactic acidosis    8/23 Blood & Urine cxs NGTD   #Transaminitis  with elevated tbili  #Thrombocytopenia/ Anemia     parasite smear NEGATIVE     CXR Irregular right apical pleural thickening redemonstrated, appears   increased compared to prior. Somewhat patchy right upper lobe opacities   also noted. Right pleural effusion. Recommend chest CT evaluation.    CT No evidence of acute abdominal or pelvic inflammatory process.  Mild lobulated contour of the liver. Early cirrhosis should be considered.  CT Chest Stable fibrotic changes are again noted in the right upper lobe.   Unchanged focal area of consolidation/atelectasis at the left lung base.   Small right pleural effusion. Bilateral calcified pleural plaques.   Calcified granulomas are noted within the left upper lobe.  < from: US Abdomen Upper Quadrant Right (08.23.23 @ 19:50) >  Cholelithiasis without sonographic evidence of acute cholecystitis.  Echogenic gallbladder focus measuring up to 7 mm, difficult to   differentiate between stone and polyp. Recommend sonographic follow-up.  Nodular contour of liver as seen on CT, suggestive of cirrhosis.    Hep B/C NR   #Abx allergy: No Known Allergies  #KOBE  Creatinine: 2.3 (08-23-23 @ 23:09)  crcl 23  Height (cm): 162.6 (08-23-23 @ 11:36)  Weight (kg): 59.9 (08-23-23 @ 11:36)    RECOMMENDATIONS  - Cefepime 1g q24h IV   - RVP  - quantiferon gold   - LDH   - GI - pending EUS/ERCP (if findings consistent with GB infection, likely D/C on PO levaquin renally dosed/ flagyl 500mg TID x 7 days post procedure)    If any questions, please send a message or call on [facility] Teams  Please continue to update ID with any pertinent new laboratory or radiographic findings

## 2023-08-25 NOTE — PROGRESS NOTE ADULT - ASSESSMENT
76 yo Cantonese speaking  male with a pmh of CHF unknown ef, hypertension, hyperlipidemia, CAD, CKD, CVA 8 years ago, history of GI bleed per wife??? presenting for c/o n/v/subjective fevers that started this morning. pt has experienced 5+ episodes of NBNB emesis and abdominal and chest pain. + constipation. Denies diarrhea, sore throat, runny nose. reports worsening difficulty urinating over past day.    # Sepsis on admission  # NAGMA  # R/O Choledocholithiasis/ Cholangitis   - RUQ US: Cholelithiasis without sonographic evidence of acute cholecystitis. Echogenic gallbladder focus measuring up to 7 mm, difficult to differentiate between stone and polyp.   - CT abd/pelv: No evidence of acute abdominal or pelvic inflammatory process.   - ID consulted, recs appreciated   - c/w iv cefepime q24h   - bcx ntd   - parasite smear prelim neg, f/u LDH   - HIV neg, Hep neg   - rvp negative    - f/u quantiferon  - advance GI following, plan for EUS +/- ERCP today 8/25  - s/p bicarb drip, today pH 7.35, dc bicarb and start LR    # Early Liver Cirrhosis  - CT A/P: Mild lobulated contour of the liver. Early cirrhosis should be considered  - acute/chronic liver disease w/u  - Follow up with hepatology as outpatient    #CKD IIIb   - IVF with bicarb stopped 8/25  - Cr 2.1    #right lung effusion   -CT chest 8/24 shows left upper lobe calcified granulomas  -right lower lobe effusion  -quantiferon in progress  -consider pulm consult      #H/o CAD/HF -stable   #HTN / Dyslipidemia   - hold statin / Edarbi/ Verquvo for now     DVT prophylaxis: hsq  Pending: ERCP, blood cultures, quantiferion

## 2023-08-25 NOTE — PROGRESS NOTE ADULT - SUBJECTIVE AND OBJECTIVE BOX
VALERORITIKA  77y, Male  Allergy: No Known Allergies      LOS  2d    CHIEF COMPLAINT:     INTERVAL EVENTS/HPI  - No acute events overnight  - T(F): , Max: 100.5 (08-24-23 @ 16:19) fever curve downtrending   - Tolerating medication  - WBC Count: 8.40 (08-24-23 @ 17:31)  WBC Count: 10.14 (08-24-23 @ 13:30)     - Creatinine: 2.3 (08-24-23 @ 20:00)  Creatinine: 2.3 (08-24-23 @ 17:31)       ROS  ***    VITALS:  T(F): 100.5, Max: 100.5 (08-24-23 @ 16:19)  HR: 89  BP: 158/70  RR: 18Vital Signs Last 24 Hrs  T(C): 38.1 (24 Aug 2023 16:19), Max: 38.1 (24 Aug 2023 16:19)  T(F): 100.5 (24 Aug 2023 16:19), Max: 100.5 (24 Aug 2023 16:19)  HR: 89 (25 Aug 2023 00:00) (85 - 96)  BP: 158/70 (25 Aug 2023 00:00) (137/65 - 164/75)  BP(mean): 108 (24 Aug 2023 16:19) (108 - 108)  RR: 18 (25 Aug 2023 00:00) (18 - 19)  SpO2: 96% (24 Aug 2023 16:19) (96% - 96%)    Parameters below as of 24 Aug 2023 16:19  Patient On (Oxygen Delivery Method): room air        PHYSICAL EXAM:  ***    FH: Non-contributory  Social Hx: Non-contributory    TESTS & MEASUREMENTS:                        9.7    8.40  )-----------( 72       ( 24 Aug 2023 17:31 )             28.6     08-24    137  |  109  |  36<H>  ----------------------------<  117<H>  4.9   |  18  |  2.3<H>    Ca    7.9<L>      24 Aug 2023 20:00  Mg     1.9     08-24    TPro  5.0<L>  /  Alb  2.7<L>  /  TBili  2.2<H>  /  DBili  x   /  AST  138<H>  /  ALT  184<H>  /  AlkPhos  152<H>  08-24      LIVER FUNCTIONS - ( 24 Aug 2023 20:00 )  Alb: 2.7 g/dL / Pro: 5.0 g/dL / ALK PHOS: 152 U/L / ALT: 184 U/L / AST: 138 U/L / GGT: x           Urinalysis Basic - ( 24 Aug 2023 20:00 )    Color: x / Appearance: x / SG: x / pH: x  Gluc: 117 mg/dL / Ketone: x  / Bili: x / Urobili: x   Blood: x / Protein: x / Nitrite: x   Leuk Esterase: x / RBC: x / WBC x   Sq Epi: x / Non Sq Epi: x / Bacteria: x        Culture - Urine (collected 08-23-23 @ 21:05)  Source: Clean Catch Clean Catch (Midstream)  Final Report (08-24-23 @ 22:19):    <10,000 CFU/mL Normal Urogenital Janny    Culture - Blood (collected 08-23-23 @ 19:49)  Source: .Blood Blood  Preliminary Report (08-25-23 @ 02:02):    No growth at 24 hours    Culture - Blood (collected 08-23-23 @ 19:49)  Source: .Blood Blood  Preliminary Report (08-25-23 @ 02:02):    No growth at 24 hours        Lactate, Blood: 1.2 mmol/L (08-23-23 @ 23:09)  Lactate, Blood: 2.7 mmol/L (08-23-23 @ 13:26)      INFECTIOUS DISEASES TESTING  HIV-1/2 Combo Result: Nonreact (08-24-23 @ 17:31)  Procalcitonin, Serum: 0.07 (01-19-23 @ 21:15)  COVID-19 PCR: Detected (01-19-23 @ 12:00)  strept    INFLAMMATORY MARKERS      RADIOLOGY & ADDITIONAL TESTS:  I have personally reviewed the last available Chest xray  CXR  Xray Chest 1 View- PORTABLE-Urgent:   ACC: [id number] EXAM:  XR CHEST PORTABLE URGENT 1V   ORDERED BY: AILEEN NORMAN     PROCEDURE DATE:  08/23/2023          INTERPRETATION:  Clinical History / Reason for exam: Fever    Comparison : Chest radiograph 1/20/2023.    Technique/Positioning: Frontal chest radiograph.    Findings:    Support devices: None.    Cardiac/mediastinum/hilum: Atherosclerotic aorta    Lung parenchyma/Pleura: Irregular right apical pleural thickening   redemonstrated, appears increased compared to prior. Somewhatpatchy   right upper lobe opacities also noted. Right pleural effusion. No   pneumothorax.    Skeleton/soft tissues: Bony demineralization. Degenerative changes.    Impression:    Irregular right apical pleural thickening redemonstrated, appears   increased compared to prior. Somewhat patchy right upper lobe opacities   also noted. Right pleural effusion. Recommend chest CT evaluation.    --- End of Report ---            RO VILLA MD; Attending Radiologist  This document has been electronically signed. Aug 23 2023  2:25PM (08-23-23 @ 14:14)      CT  CT Chest w/ IV Cont:   ACC: [id number] EXAM:  CT CHEST IC   ORDERED BY: AILEEN NORMAN     ACC: [id number] EXAM:  CT ABDOMEN AND PELVIS IC   ORDERED BY: AILEEN NORMAN     PROCEDURE DATE:  08/23/2023          INTERPRETATION:  REASON FOR EXAM / CLINICAL STATEMENT:  Fever and nausea.   WBC 12.19   PMHx of CHF, HTN, HLD, CAD, CKD3, CVA      TECHNIQUE:  Contiguous axial CT images were obtained from the thoracic   inlet through the pubic symphysis with IV contrast. IV contrast was   employed (Omnipaque 350); 95 ml was administered and 5 ml was discarded.   Reformatted images in the coronal and sagittal planes were acquired.      COMPARISON CT: CT scan of the chest, abdomen and pelvis dated 1/19/2023.    OTHER STUDIES USED FOR CORRELATION: None.      FINDINGS / CHEST:    TUBES AND LINES: None.    HEART AND VESSELS: The heart size is at the upper limits of normal.   Coronary artery calcifications are noted.  There is no pericardial   effusion.    There is no evidence of central pulmonary embolism.    Normal caliber aorta and pulmonary artery. Aortic calcifications are   noted. There is no evidence of aortic aneurysm or dissection.    Brachiocephalic vessels are unremarkable.    MEDIASTINUM: There are no suspicious mediastinal, hilar or axillary lymph   nodes. Bilateral calcified hilar lymph nodes. Stable right   paratracheal/esophageal diverticulum.  The visualized portion of the thyroid gland is unremarkable.    AIRWAYS, LUNGS AND PLEURA: The central tracheobronchial tree is patent.   Stable fibrotic changes are again noted in the right upper lobe.   Unchanged focal area of consolidation/atelectasis at the left lung base.   Small right pleural effusion. Bilateral calcified pleural plaques.   Calcified granulomas are noted within the left upper lobe. Thereis no   pneumothorax.    BONES AND SOFT TISSUES: No acute osseous abnormalities.      FINDINGS / ABDOMEN AND PELVIS:      HEPATIC: The liver is normal in size with mild lobulated contour. Early   cirrhosis should be considered. No evidence of solid mass or bile duct   dilatation. Mild hepatic steatosis is noted. The portal vein is patent.    BILIARY: Cholelithiasis    SPLEEN: Unremarkable.    PANCREAS: The pancreas is normal in size and configuration. No evidence   of mass or pancreatitis.    ADRENAL GLANDS: Unremarkable.    KIDNEYS: Left renal atrophy is noted. No evidence of hydronephrosis,   calcified stones, or solid mass. Multiple bilateral renal cysts and   subcentimeter hypodense cortical lesions too small to characterize.    ABDOMINOPELVIC NODES: Unremarkable.    PELVIC ORGANS: No evidence of pelvic mass, lymphadenopathy, or fluid   collection.    BLADDER: Unremarkable.    PERITONEUM/MESENTERY/BOWEL: Colonic diverticula noted with no evidence of   diverticulitis. No evidence of bowel obstruction, colitis, inflammatory   process, or ascites. No pneumoperitoneum.  The appendix is normal in   appearance.    BONES/SOFT TISSUES: No acute osseous abnormalities.    OTHER: Aortoiliac and visceral artery calcifications are noted with no   evidence of abdominal aortic aneurysm. Dense calcifications with stenosis   are noted at the origins of the left renal and celiac arteries.      IMPRESSION:    No evidence of acute abdominal or pelvic inflammatory process.    Mild lobulated contour of the liver. Early cirrhosis should be considered.    --- End of Report ---            ELIER VIZCAINO MD; Attending Interventional Radiologist  This document has been electronically signed. Aug 23 2023  5:54PM (08-23-23 @ 16:55)      CARDIOLOGY TESTING  12 Lead ECG:   Ventricular Rate 96 BPM    Atrial Rate 96 BPM    P-R Interval 154 ms    QRS Duration 90 ms    Q-T Interval 360 ms    QTC Calculation(Bazett) 454 ms    P Axis 54 degrees    R Axis -30 degrees    T Axis 12 degrees    Diagnosis Line Sinus rhythm with frequent Premature ventricular complexes  Left axis deviation  Minimal voltage criteria for LVH, may be normal variant  Abnormal ECG    Confirmed by Jevon Justice ([id number]) on 8/24/2023 6:03:22 PM (08-24-23 @ 16:17)  12 Lead ECG:   Ventricular Rate 86 BPM    Atrial Rate 86 BPM    P-R Interval 170 ms    QRS Duration 92 ms    Q-T Interval 396 ms    QTC Calculation(Bazett) 473 ms    P Axis 54 degrees    R Axis -11 degrees    T Axis 24 degrees    Diagnosis Line Sinus rhythm with frequent Premature ventricular complexes  Otherwise normal ECG    Confirmed by Jevon Justice [id number]) on 8/24/2023 7:26:58 AM (08-24-23 @ 02:33)      MEDICATIONS  budesonide 160 MICROgram(s)/formoterol 4.5 MICROgram(s) Inhaler 2 Inhalation two times a day  cefepime   IVPB 1000 IV Intermittent every 24 hours  chlorhexidine 2% Cloths 1 Topical <User Schedule>  cloNIDine Patch 0.2 mG/24Hr(s) 1 Transdermal every 7 days  cyanocobalamin 1000 Oral daily  dextrose 5% 1000 IV Continuous <Continuous>  heparin   Injectable 5000 SubCutaneous every 12 hours  ketotifen 0.025% Ophthalmic Solution 1 Both EYES daily  multivitamin 1 Oral daily  pantoprazole    Tablet 40 Oral before breakfast  polyethylene glycol 3350 17 Oral two times a day  senna 2 Oral at bedtime  tamsulosin 0.4 Oral at bedtime      WEIGHT  Weight (kg): 59.9 (08-23-23 @ 11:36)  Creatinine: 2.3 mg/dL (08-24-23 @ 20:00)  Creatinine: 2.3 mg/dL (08-24-23 @ 17:31)  Creatinine: 2.1 mg/dL (08-24-23 @ 13:30)      ANTIBIOTICS:  cefepime   IVPB 1000 milliGRAM(s) IV Intermittent every 24 hours      All available historical records have been reviewed       VALERORITIKA  77y, Male  Allergy: No Known Allergies      LOS  2d    CHIEF COMPLAINT:     INTERVAL EVENTS/HPI  - No acute events overnight  - T(F): , Max: 100.5 (08-24-23 @ 16:19) fever curve downtrending   - Tolerating medication  - WBC Count: 8.40 (08-24-23 @ 17:31)  WBC Count: 10.14 (08-24-23 @ 13:30)     - Creatinine: 2.3 (08-24-23 @ 20:00)  Creatinine: 2.3 (08-24-23 @ 17:31)       ROS  General: Denies rigors, nightsweats  HEENT: Denies headache, rhinorrhea, sore throat, eye pain  CV: Denies CP, palpitations  PULM: Denies wheezing, hemoptysis  GI: Denies hematemesis, hematochezia, melena  : Denies discharge, hematuria  MSK: Denies arthralgias, myalgias  SKIN: Denies rash, lesions  NEURO: Denies paresthesias, weakness  PSYCH: Denies depression, anxiety     VITALS:  T(F): 100.5, Max: 100.5 (08-24-23 @ 16:19)  HR: 89  BP: 158/70  RR: 18Vital Signs Last 24 Hrs  T(C): 38.1 (24 Aug 2023 16:19), Max: 38.1 (24 Aug 2023 16:19)  T(F): 100.5 (24 Aug 2023 16:19), Max: 100.5 (24 Aug 2023 16:19)  HR: 89 (25 Aug 2023 00:00) (85 - 96)  BP: 158/70 (25 Aug 2023 00:00) (137/65 - 164/75)  BP(mean): 108 (24 Aug 2023 16:19) (108 - 108)  RR: 18 (25 Aug 2023 00:00) (18 - 19)  SpO2: 96% (24 Aug 2023 16:19) (96% - 96%)    Parameters below as of 24 Aug 2023 16:19  Patient On (Oxygen Delivery Method): room air        PHYSICAL EXAM:  Gen: NAD, resting in bed  HEENT: Normocephalic, atraumatic  Neck: supple, no lymphadenopathy  CV: Regular rate & regular rhythm  Lungs: decreased BS at bases, no fremitus  Abdomen: Soft, BS present, no RUQ tenderness to palpation   Ext: Warm, well perfused  Neuro: non focal, awake  Skin: no rash, no erythema  Lines: no phlebitis     FH: Non-contributory  Social Hx: Non-contributory    TESTS & MEASUREMENTS:                        9.7    8.40  )-----------( 72       ( 24 Aug 2023 17:31 )             28.6     08-24    137  |  109  |  36<H>  ----------------------------<  117<H>  4.9   |  18  |  2.3<H>    Ca    7.9<L>      24 Aug 2023 20:00  Mg     1.9     08-24    TPro  5.0<L>  /  Alb  2.7<L>  /  TBili  2.2<H>  /  DBili  x   /  AST  138<H>  /  ALT  184<H>  /  AlkPhos  152<H>  08-24      LIVER FUNCTIONS - ( 24 Aug 2023 20:00 )  Alb: 2.7 g/dL / Pro: 5.0 g/dL / ALK PHOS: 152 U/L / ALT: 184 U/L / AST: 138 U/L / GGT: x           Urinalysis Basic - ( 24 Aug 2023 20:00 )    Color: x / Appearance: x / SG: x / pH: x  Gluc: 117 mg/dL / Ketone: x  / Bili: x / Urobili: x   Blood: x / Protein: x / Nitrite: x   Leuk Esterase: x / RBC: x / WBC x   Sq Epi: x / Non Sq Epi: x / Bacteria: x        Culture - Urine (collected 08-23-23 @ 21:05)  Source: Clean Catch Clean Catch (Midstream)  Final Report (08-24-23 @ 22:19):    <10,000 CFU/mL Normal Urogenital Janny    Culture - Blood (collected 08-23-23 @ 19:49)  Source: .Blood Blood  Preliminary Report (08-25-23 @ 02:02):    No growth at 24 hours    Culture - Blood (collected 08-23-23 @ 19:49)  Source: .Blood Blood  Preliminary Report (08-25-23 @ 02:02):    No growth at 24 hours        Lactate, Blood: 1.2 mmol/L (08-23-23 @ 23:09)  Lactate, Blood: 2.7 mmol/L (08-23-23 @ 13:26)      INFECTIOUS DISEASES TESTING  HIV-1/2 Combo Result: Nonreact (08-24-23 @ 17:31)  Procalcitonin, Serum: 0.07 (01-19-23 @ 21:15)  COVID-19 PCR: Detected (01-19-23 @ 12:00)  strept    INFLAMMATORY MARKERS      RADIOLOGY & ADDITIONAL TESTS:  I have personally reviewed the last available Chest xray  CXR  Xray Chest 1 View- PORTABLE-Urgent:   ACC: [id number] EXAM:  XR CHEST PORTABLE URGENT 1V   ORDERED BY: AILEEN NORMAN     PROCEDURE DATE:  08/23/2023          INTERPRETATION:  Clinical History / Reason for exam: Fever    Comparison : Chest radiograph 1/20/2023.    Technique/Positioning: Frontal chest radiograph.    Findings:    Support devices: None.    Cardiac/mediastinum/hilum: Atherosclerotic aorta    Lung parenchyma/Pleura: Irregular right apical pleural thickening   redemonstrated, appears increased compared to prior. Somewhatpatchy   right upper lobe opacities also noted. Right pleural effusion. No   pneumothorax.    Skeleton/soft tissues: Bony demineralization. Degenerative changes.    Impression:    Irregular right apical pleural thickening redemonstrated, appears   increased compared to prior. Somewhat patchy right upper lobe opacities   also noted. Right pleural effusion. Recommend chest CT evaluation.    --- End of Report ---            RO VILLA MD; Attending Radiologist  This document has been electronically signed. Aug 23 2023  2:25PM (08-23-23 @ 14:14)      CT  CT Chest w/ IV Cont:   ACC: [id number] EXAM:  CT CHEST IC   ORDERED BY: AILEEN NORMAN     ACC: [id number] EXAM:  CT ABDOMEN AND PELVIS IC   ORDERED BY: AILEEN NORMAN     PROCEDURE DATE:  08/23/2023          INTERPRETATION:  REASON FOR EXAM / CLINICAL STATEMENT:  Fever and nausea.   WBC 12.19   PMHx of CHF, HTN, HLD, CAD, CKD3, CVA      TECHNIQUE:  Contiguous axial CT images were obtained from the thoracic   inlet through the pubic symphysis with IV contrast. IV contrast was   employed (Omnipaque 350); 95 ml was administered and 5 ml was discarded.   Reformatted images in the coronal and sagittal planes were acquired.      COMPARISON CT: CT scan of the chest, abdomen and pelvis dated 1/19/2023.    OTHER STUDIES USED FOR CORRELATION: None.      FINDINGS / CHEST:    TUBES AND LINES: None.    HEART AND VESSELS: The heart size is at the upper limits of normal.   Coronary artery calcifications are noted.  There is no pericardial   effusion.    There is no evidence of central pulmonary embolism.    Normal caliber aorta and pulmonary artery. Aortic calcifications are   noted. There is no evidence of aortic aneurysm or dissection.    Brachiocephalic vessels are unremarkable.    MEDIASTINUM: There are no suspicious mediastinal, hilar or axillary lymph   nodes. Bilateral calcified hilar lymph nodes. Stable right   paratracheal/esophageal diverticulum.  The visualized portion of the thyroid gland is unremarkable.    AIRWAYS, LUNGS AND PLEURA: The central tracheobronchial tree is patent.   Stable fibrotic changes are again noted in the right upper lobe.   Unchanged focal area of consolidation/atelectasis at the left lung base.   Small right pleural effusion. Bilateral calcified pleural plaques.   Calcified granulomas are noted within the left upper lobe. Thereis no   pneumothorax.    BONES AND SOFT TISSUES: No acute osseous abnormalities.      FINDINGS / ABDOMEN AND PELVIS:      HEPATIC: The liver is normal in size with mild lobulated contour. Early   cirrhosis should be considered. No evidence of solid mass or bile duct   dilatation. Mild hepatic steatosis is noted. The portal vein is patent.    BILIARY: Cholelithiasis    SPLEEN: Unremarkable.    PANCREAS: The pancreas is normal in size and configuration. No evidence   of mass or pancreatitis.    ADRENAL GLANDS: Unremarkable.    KIDNEYS: Left renal atrophy is noted. No evidence of hydronephrosis,   calcified stones, or solid mass. Multiple bilateral renal cysts and   subcentimeter hypodense cortical lesions too small to characterize.    ABDOMINOPELVIC NODES: Unremarkable.    PELVIC ORGANS: No evidence of pelvic mass, lymphadenopathy, or fluid   collection.    BLADDER: Unremarkable.    PERITONEUM/MESENTERY/BOWEL: Colonic diverticula noted with no evidence of   diverticulitis. No evidence of bowel obstruction, colitis, inflammatory   process, or ascites. No pneumoperitoneum.  The appendix is normal in   appearance.    BONES/SOFT TISSUES: No acute osseous abnormalities.    OTHER: Aortoiliac and visceral artery calcifications are noted with no   evidence of abdominal aortic aneurysm. Dense calcifications with stenosis   are noted at the origins of the left renal and celiac arteries.      IMPRESSION:    No evidence of acute abdominal or pelvic inflammatory process.    Mild lobulated contour of the liver. Early cirrhosis should be considered.    --- End of Report ---            ELIER VIZCAINO MD; Attending Interventional Radiologist  This document has been electronically signed. Aug 23 2023  5:54PM (08-23-23 @ 16:55)      CARDIOLOGY TESTING  12 Lead ECG:   Ventricular Rate 96 BPM    Atrial Rate 96 BPM    P-R Interval 154 ms    QRS Duration 90 ms    Q-T Interval 360 ms    QTC Calculation(Bazett) 454 ms    P Axis 54 degrees    R Axis -30 degrees    T Axis 12 degrees    Diagnosis Line Sinus rhythm with frequent Premature ventricular complexes  Left axis deviation  Minimal voltage criteria for LVH, may be normal variant  Abnormal ECG    Confirmed by Jevon Justice ([id number]) on 8/24/2023 6:03:22 PM (08-24-23 @ 16:17)  12 Lead ECG:   Ventricular Rate 86 BPM    Atrial Rate 86 BPM    P-R Interval 170 ms    QRS Duration 92 ms    Q-T Interval 396 ms    QTC Calculation(Bazett) 473 ms    P Axis 54 degrees    R Axis -11 degrees    T Axis 24 degrees    Diagnosis Line Sinus rhythm with frequent Premature ventricular complexes  Otherwise normal ECG    Confirmed by Jevon Justice ([id number]) on 8/24/2023 7:26:58 AM (08-24-23 @ 02:33)      MEDICATIONS  budesonide 160 MICROgram(s)/formoterol 4.5 MICROgram(s) Inhaler 2 Inhalation two times a day  cefepime   IVPB 1000 IV Intermittent every 24 hours  chlorhexidine 2% Cloths 1 Topical <User Schedule>  cloNIDine Patch 0.2 mG/24Hr(s) 1 Transdermal every 7 days  cyanocobalamin 1000 Oral daily  dextrose 5% 1000 IV Continuous <Continuous>  heparin   Injectable 5000 SubCutaneous every 12 hours  ketotifen 0.025% Ophthalmic Solution 1 Both EYES daily  multivitamin 1 Oral daily  pantoprazole    Tablet 40 Oral before breakfast  polyethylene glycol 3350 17 Oral two times a day  senna 2 Oral at bedtime  tamsulosin 0.4 Oral at bedtime      WEIGHT  Weight (kg): 59.9 (08-23-23 @ 11:36)  Creatinine: 2.3 mg/dL (08-24-23 @ 20:00)  Creatinine: 2.3 mg/dL (08-24-23 @ 17:31)  Creatinine: 2.1 mg/dL (08-24-23 @ 13:30)      ANTIBIOTICS:  cefepime   IVPB 1000 milliGRAM(s) IV Intermittent every 24 hours      All available historical records have been reviewed

## 2023-08-26 LAB
ALBUMIN SERPL ELPH-MCNC: 2.8 G/DL — LOW (ref 3.5–5.2)
ALP SERPL-CCNC: 206 U/L — HIGH (ref 30–115)
ALT FLD-CCNC: 117 U/L — HIGH (ref 0–41)
ANION GAP SERPL CALC-SCNC: 11 MMOL/L — SIGNIFICANT CHANGE UP (ref 7–14)
AST SERPL-CCNC: 57 U/L — HIGH (ref 0–41)
BASOPHILS # BLD AUTO: 0 K/UL — SIGNIFICANT CHANGE UP (ref 0–0.2)
BASOPHILS NFR BLD AUTO: 0 % — SIGNIFICANT CHANGE UP (ref 0–1)
BILIRUB SERPL-MCNC: 2 MG/DL — HIGH (ref 0.2–1.2)
BUN SERPL-MCNC: 31 MG/DL — HIGH (ref 10–20)
CALCIUM SERPL-MCNC: 8.4 MG/DL — SIGNIFICANT CHANGE UP (ref 8.4–10.5)
CHLORIDE SERPL-SCNC: 107 MMOL/L — SIGNIFICANT CHANGE UP (ref 98–110)
CO2 SERPL-SCNC: 19 MMOL/L — SIGNIFICANT CHANGE UP (ref 17–32)
CREAT SERPL-MCNC: 1.7 MG/DL — HIGH (ref 0.7–1.5)
EGFR: 41 ML/MIN/1.73M2 — LOW
EOSINOPHIL # BLD AUTO: 0 K/UL — SIGNIFICANT CHANGE UP (ref 0–0.7)
EOSINOPHIL NFR BLD AUTO: 0 % — SIGNIFICANT CHANGE UP (ref 0–8)
GLUCOSE BLDC GLUCOMTR-MCNC: 119 MG/DL — HIGH (ref 70–99)
GLUCOSE BLDC GLUCOMTR-MCNC: 125 MG/DL — HIGH (ref 70–99)
GLUCOSE BLDC GLUCOMTR-MCNC: 131 MG/DL — HIGH (ref 70–99)
GLUCOSE BLDC GLUCOMTR-MCNC: 144 MG/DL — HIGH (ref 70–99)
GLUCOSE SERPL-MCNC: 130 MG/DL — HIGH (ref 70–99)
HCT VFR BLD CALC: 28.3 % — LOW (ref 42–52)
HGB BLD-MCNC: 9.7 G/DL — LOW (ref 14–18)
IMM GRANULOCYTES NFR BLD AUTO: 0.5 % — HIGH (ref 0.1–0.3)
INR BLD: 1.01 RATIO — SIGNIFICANT CHANGE UP (ref 0.65–1.3)
LYMPHOCYTES # BLD AUTO: 0.22 K/UL — LOW (ref 1.2–3.4)
LYMPHOCYTES # BLD AUTO: 5.9 % — LOW (ref 20.5–51.1)
MAGNESIUM SERPL-MCNC: 1.8 MG/DL — SIGNIFICANT CHANGE UP (ref 1.8–2.4)
MCHC RBC-ENTMCNC: 30.6 PG — SIGNIFICANT CHANGE UP (ref 27–31)
MCHC RBC-ENTMCNC: 34.3 G/DL — SIGNIFICANT CHANGE UP (ref 32–37)
MCV RBC AUTO: 89.3 FL — SIGNIFICANT CHANGE UP (ref 80–94)
MONOCYTES # BLD AUTO: 0.14 K/UL — SIGNIFICANT CHANGE UP (ref 0.1–0.6)
MONOCYTES NFR BLD AUTO: 3.8 % — SIGNIFICANT CHANGE UP (ref 1.7–9.3)
NEUTROPHILS # BLD AUTO: 3.33 K/UL — SIGNIFICANT CHANGE UP (ref 1.4–6.5)
NEUTROPHILS NFR BLD AUTO: 89.8 % — HIGH (ref 42.2–75.2)
NRBC # BLD: 0 /100 WBCS — SIGNIFICANT CHANGE UP (ref 0–0)
PLATELET # BLD AUTO: 74 K/UL — LOW (ref 130–400)
PMV BLD: 12.3 FL — HIGH (ref 7.4–10.4)
POTASSIUM SERPL-MCNC: 5.1 MMOL/L — HIGH (ref 3.5–5)
POTASSIUM SERPL-SCNC: 5.1 MMOL/L — HIGH (ref 3.5–5)
PROT SERPL-MCNC: 5.2 G/DL — LOW (ref 6–8)
PROTHROM AB SERPL-ACNC: 11.5 SEC — SIGNIFICANT CHANGE UP (ref 9.95–12.87)
RBC # BLD: 3.17 M/UL — LOW (ref 4.7–6.1)
RBC # FLD: 14.6 % — HIGH (ref 11.5–14.5)
SODIUM SERPL-SCNC: 137 MMOL/L — SIGNIFICANT CHANGE UP (ref 135–146)
WBC # BLD: 3.71 K/UL — LOW (ref 4.8–10.8)
WBC # FLD AUTO: 3.71 K/UL — LOW (ref 4.8–10.8)

## 2023-08-26 PROCEDURE — 99232 SBSQ HOSP IP/OBS MODERATE 35: CPT

## 2023-08-26 PROCEDURE — 99233 SBSQ HOSP IP/OBS HIGH 50: CPT

## 2023-08-26 RX ORDER — NIFEDIPINE 30 MG
30 TABLET, EXTENDED RELEASE 24 HR ORAL DAILY
Refills: 0 | Status: DISCONTINUED | OUTPATIENT
Start: 2023-08-26 | End: 2023-08-28

## 2023-08-26 RX ADMIN — CEFEPIME 100 MILLIGRAM(S): 1 INJECTION, POWDER, FOR SOLUTION INTRAMUSCULAR; INTRAVENOUS at 22:19

## 2023-08-26 RX ADMIN — Medication 30 MILLIGRAM(S): at 10:04

## 2023-08-26 RX ADMIN — PANTOPRAZOLE SODIUM 40 MILLIGRAM(S): 20 TABLET, DELAYED RELEASE ORAL at 06:31

## 2023-08-26 RX ADMIN — Medication 1 TABLET(S): at 11:13

## 2023-08-26 RX ADMIN — Medication 1 PATCH: at 19:42

## 2023-08-26 RX ADMIN — SENNA PLUS 2 TABLET(S): 8.6 TABLET ORAL at 22:22

## 2023-08-26 RX ADMIN — HEPARIN SODIUM 5000 UNIT(S): 5000 INJECTION INTRAVENOUS; SUBCUTANEOUS at 17:52

## 2023-08-26 RX ADMIN — TAMSULOSIN HYDROCHLORIDE 0.4 MILLIGRAM(S): 0.4 CAPSULE ORAL at 22:22

## 2023-08-26 RX ADMIN — PREGABALIN 1000 MICROGRAM(S): 225 CAPSULE ORAL at 11:13

## 2023-08-26 RX ADMIN — KETOTIFEN FUMARATE 1 DROP(S): 0.34 SOLUTION OPHTHALMIC at 11:13

## 2023-08-26 RX ADMIN — HEPARIN SODIUM 5000 UNIT(S): 5000 INJECTION INTRAVENOUS; SUBCUTANEOUS at 06:31

## 2023-08-26 RX ADMIN — BUDESONIDE AND FORMOTEROL FUMARATE DIHYDRATE 2 PUFF(S): 160; 4.5 AEROSOL RESPIRATORY (INHALATION) at 22:18

## 2023-08-26 RX ADMIN — CHLORHEXIDINE GLUCONATE 1 APPLICATION(S): 213 SOLUTION TOPICAL at 06:29

## 2023-08-26 RX ADMIN — Medication 1 PATCH: at 08:16

## 2023-08-26 NOTE — PROGRESS NOTE ADULT - ASSESSMENT
76 yo Cantonese speaking  male with a pmh of CHF unknown ef, hypertension, hyperlipidemia, CAD, CKD, CVA 8 years ago, history of GI bleed per wife??? presenting for c/o n/v/subjective fevers that started this morning. pt has experienced 5+ episodes of NBNB emesis and abdominal and chest pain. + constipation. Denies diarrhea, sore throat, runny nose. reports worsening difficulty urinating over past day.    # Sepsis on admission  # NAGMA  # R/O Choledocholithiasis/ Cholangitis   - RUQ US: Cholelithiasis without sonographic evidence of acute cholecystitis. Echogenic gallbladder focus measuring up to 7 mm, difficult to differentiate between stone and polyp.   - CT abd/pelv: No evidence of acute abdominal or pelvic inflammatory process.   - ID consulted, recs appreciated   - c/w iv cefepime q24h   - bcx ntd   - parasite smear prelim neg, f/u LDH   - HIV neg, Hep neg   - rvp negative    - f/u quantiferon  - [facility] following, - ERCP 8/25 with biliary sphincterotomy.  A 10 F x 7 cm plastic CBD stent was placed with adequate drainage. A 5 F x 4 cm plastic pancreatic stent was placed  - transaminitis improving   - today on clear fluids     # Early Liver Cirrhosis  - CT A/P: Mild lobulated contour of the liver. Early cirrhosis should be considered  - acute/chronic liver disease w/u  - Follow up with hepatology as outpatient    #CKD IIIb   - IVF with bicarb stopped 8/25  - Cr 2.1    #right lung effusion   -CT chest 8/24 shows left upper lobe calcified granulomas  -right lower lobe effusion  -quantiferon in progress  -consider pulm consult    -procal 19    #H/o CAD/HF -stable   #HTN / Dyslipidemia   - hold statin / Edarbi/ Verquvo for now     DVT prophylaxis: hsq  Pending: diet tolerance, blood cultures, quantiferion Refill?

## 2023-08-26 NOTE — PROGRESS NOTE ADULT - SUBJECTIVE AND OBJECTIVE BOX
Gastroenterology Follow Up Note      Location: [facility] 4B 031 A [facility]  Patient Name: RITIKA VALERO  Age: 77y  Gender: Male      Chief Complaint  Patient is a 77y old Male who presents with a chief complaint of fever (26 Aug 2023 14:36)  Primary diagnosis of Nausea and vomiting      Reason for Consult  Choledocholithiasis/ Cholangitis  Liver Cirrhosis      Progress Note  This morning patient was seen and examined at bedside.    Today is hospital day 3d.  Patient is doing fine. No acute events overnight.   Per wife, he has been having productive cough since procedure.  Patient's appetite is reduced, and he denies nausea or vomiting.   Patient denies any abdominal pain.  Last bowel movement was soft and was on 08/25.      Vital Signs in the last 24 hours   Vitals Summary T(C): 36.4 (08-26-23 @ 15:00), Max: 36.8 (08-25-23 @ 20:31)  HR: 72 (08-26-23 @ 15:00) (72 - 79)  BP: 126/59 (08-26-23 @ 15:00) (126/59 - 188/84)  RR: 18 (08-26-23 @ 15:00) (18 - 18)  SpO2: --  Vent Data   Intake/ Output   08-25-23 @ 07:01  -  08-26-23 @ 07:00  --------------------------------------------------------  IN: 500 mL / OUT: 0 mL / NET: 500 mL      Physical Exam  * General Appearance: Alert, cooperative, interactive, oriented to time, place, and person, in no acute distress  * Eyes: PERRL, conjunctiva/corneas clear, EOM's intact, fundi benign, both eyes  * Throat: Lips, mucosa, and tongue normal; teeth and gums normal  * Neck: Supple, symmetrical, trachea midline, no adenopathy   * Lungs: reduced bilateral air entry likely from reduced effort, audible crackles  * Heart: Regular Rate and Rhythm, normal S1 and S2, no audible murmur, rub, or gallop  * Abdomen: Symmetric, non-distended, soft, non-tender, bowel sounds active all four quadrants      Investigations   Laboratory Workup      - CBC:                        9.7    3.71  )-----------( 74       ( 26 Aug 2023 07:05 )             28.3       - Hgb Trend:  9.7  08-26-23 @ 07:05  10.1  08-25-23 @ 07:29  9.7  08-24-23 @ 17:31  9.4  08-24-23 @ 13:30  9.8  08-23-23 @ 23:09          - Chemistry:  08-26    137  |  107  |  31<H>  ----------------------------<  130<H>  5.1<H>   |  19  |  1.7<H>    Ca    8.4      26 Aug 2023 07:05  Mg     1.8     08-26    TPro  5.2<L>  /  Alb  2.8<L>  /  TBili  2.0<H>  /  DBili  x   /  AST  57<H>  /  ALT  117<H>  /  AlkPhos  206<H>  08-26    Liver panel trend:  TBili 2.0   /   AST 57   /      /   AlkP 206   /   Tptn 5.2   /   Alb 2.8    /   DBili --      08-26  TBili 2.6   /      /      /   AlkP 180   /   Tptn 5.6   /   Alb 3.2    /   DBili --      08-25  TBili 2.2   /      /      /   AlkP 152   /   Tptn 5.0   /   Alb 2.7    /   DBili --      08-24  TBili 2.7   /      /      /   AlkP 165   /   Tptn 5.6   /   Alb 3.0    /   DBili 2.3      08-24  TBili 2.7   /      /      /   AlkP 154   /   Tptn 5.3   /   Alb 2.9    /   DBili 2.4      08-24  TBili 2.2   /      /      /   AlkP 158   /   Tptn 5.7   /   Alb 3.3    /   DBili 2.0      08-23  TBili 1.9   /      /      /   AlkP 178   /   Tptn 7.8   /   Alb 4.0    /   DBili --      08-23      - Coagulation Studies:  PT/INR - ( 26 Aug 2023 07:05 )   PT: 11.50 sec;   INR: 1.01 ratio             - ABG:  ABG - ( 25 Aug 2023 11:34 )  pH, Arterial: 7.36  pH, Blood: x     /  pCO2: 36    /  pO2: 91    / HCO3: 20    / Base Excess: -4.5  /  SaO2: 98.7            - Cardiac Markers:  CARDIAC MARKERS ( 25 Aug 2023 07:29 )  x     / x     / 177 U/L / x     / x            Microbiological Workup  Urinalysis Basic - ( 26 Aug 2023 07:05 )    Color: x / Appearance: x / SG: x / pH: x  Gluc: 130 mg/dL / Ketone: x  / Bili: x / Urobili: x   Blood: x / Protein: x / Nitrite: x   Leuk Esterase: x / RBC: x / WBC x   Sq Epi: x / Non Sq Epi: x / Bacteria: x        Culture - Urine (collected 23 Aug 2023 21:05)  Source: Clean Catch Clean Catch (Midstream)  Final Report (24 Aug 2023 22:19):    <10,000 CFU/mL Normal Urogenital Janny        Radiological Workup            Current Medications  Standing Medications  budesonide 160 MICROgram(s)/formoterol 4.5 MICROgram(s) Inhaler 2 Puff(s) Inhalation two times a day  cefepime   IVPB 1000 milliGRAM(s) IV Intermittent every 24 hours  chlorhexidine 2% Cloths 1 Application(s) Topical <User Schedule>  cloNIDine Patch 0.2 mG/24Hr(s) 1 patch Transdermal every 7 days  cyanocobalamin 1000 MICROGram(s) Oral daily  heparin   Injectable 5000 Unit(s) SubCutaneous every 12 hours  ketotifen 0.025% Ophthalmic Solution 1 Drop(s) Both EYES daily  lactated ringers. 1000 milliLiter(s) (50 mL/Hr) IV Continuous <Continuous>  multivitamin 1 Tablet(s) Oral daily  NIFEdipine XL 30 milliGRAM(s) Oral daily  pantoprazole    Tablet 40 milliGRAM(s) Oral before breakfast  polyethylene glycol 3350 17 Gram(s) Oral two times a day  senna 2 Tablet(s) Oral at bedtime  tamsulosin 0.4 milliGRAM(s) Oral at bedtime    PRN Medications  ondansetron Injectable 4 milliGRAM(s) IV Push every 8 hours PRN Nausea and/or Vomiting    Singles Doses Administered  (ADM OVERRIDE) 1 each &lt;see task&gt; GiveOnce  (ADM OVERRIDE) 3 each &lt;see task&gt; GiveOnce  (ADM OVERRIDE) 1 each &lt;see task&gt; GiveOnce  (ADM OVERRIDE) 1 each &lt;see task&gt; GiveOnce  (ADM OVERRIDE) 1 each &lt;see task&gt; GiveOnce  (ADM OVERRIDE) 1 each &lt;see task&gt; GiveOnce  (ADM OVERRIDE) 1 each &lt;see task&gt; GiveOnce  (ADM OVERRIDE) 1 each &lt;see task&gt; GiveOnce  (ADM OVERRIDE) 1 each &lt;see task&gt; GiveOnce  acetaminophen     Tablet .. 975 milliGRAM(s) Oral once  acetaminophen     Tablet .. 650 milliGRAM(s) Oral once  calcium gluconate IVPB 1 Gram(s) IV Intermittent once  cefepime   IVPB 2000 milliGRAM(s) IV Intermittent once  cloNIDine Patch 0.2 mG/24Hr(s) 1 patch Transdermal once  dextrose 50% Injectable 50 milliLiter(s) IV Push once  insulin regular  human recombinant 10 Unit(s) IV Push once  labetalol 100 milliGRAM(s) Oral once  lactated ringers Bolus 500 milliLiter(s) IV Bolus once  ondansetron Injectable 4 milliGRAM(s) IV Push once  sodium chloride 0.9% Bolus 1500 milliLiter(s) IV Bolus once  sodium chloride 0.9% Bolus 500 milliLiter(s) IV Bolus once  sodium chloride 0.9% Bolus 250 milliLiter(s) IV Bolus once  vancomycin  IVPB. 1000 milliGRAM(s) IV Intermittent once

## 2023-08-26 NOTE — CHART NOTE - NSCHARTNOTEFT_GEN_A_CORE
Post-ERCP Follow Up Noted  - Patient is being followed status day 1 post ERCP with biliary sphincterotomy.  A 10 F x 7 cm plastic CBD stent was placed with adequate drainage. A 5 F x 4 cm plastic pancreatic stent was placed  - Vitals stable  - Labs reviewed with stable WBC and Hb  - PE: no abdominal tenderness   - Patient denies nausea, vomiting, increased epigastric pain; wife notes new cough    RECOMMENDATIONS  - Recommend startinf clear liquid diet  - Continue IV fluid hydration   - Continue IV Cefepime  - Check CXR   - Encourage incentive spirometry  - Pain control   - Antiemetics  - Will need follow up in office in 4 weeks Post-ERCP Follow Up Noted  - Patient is being followed status day 1 post ERCP with biliary sphincterotomy.  A 10 F x 7 cm plastic CBD stent was placed with adequate drainage. A 5 F x 4 cm plastic pancreatic stent was placed  - Vitals stable  - Labs reviewed with stable WBC and Hb; LFTs improved except for ALP almost stable  - PE: no abdominal tenderness   - Patient denies nausea, vomiting, increased epigastric pain; wife notes new cough    RECOMMENDATIONS  - Recommend starting clear liquid diet  - Repeat LFTs today  - Continue IV fluid hydration   - Continue IV Cefepime  - Check CXR   - Encourage incentive spirometry  - Pain control   - Antiemetics  - Will need follow up in office in 4 weeks

## 2023-08-26 NOTE — PROGRESS NOTE ADULT - ASSESSMENT
78 yo Cantonese speaking  male with a pmh of CHF unknown ef, hypertension, hyperlipidemia, CAD, CKD, CVA 8 years ago, history of GI bleed per wife??? presenting for c/o n/v/subjective fevers that started this morning. pt has experienced 5+ episodes of NBNB emesis and abdominal and chest pain. + constipation. Denies diarrhea, sore throat, runny nose. reports worsening difficulty urinating over past day.    # Sepsis on admission  # NAGMA  # R/O Choledocholithiasis/ Cholangitis   - RUQ US: Cholelithiasis without sonographic evidence of acute cholecystitis. Echogenic gallbladder focus measuring up to 7 mm, difficult to differentiate between stone and polyp.   - CT abd/pelv: No evidence of acute abdominal or pelvic inflammatory process.   - labs on admission 8/23: Alb: 2.9 g/dL / Pro: 5.3 g/dL / ALK PHOS: 154 U/L / ALT: 226 U/L / AST: 189 U/L / GGT: x  - s/p cefepime and vanco for broad coverage  - ID consulted, recs appreciated    - c/w iv cefepime q24h    - bcx ntd    - parasite smear prelim neg,  wnl    - HIV neg, Hep neg, RVP neg    - f/u quantiferon  - s/p bicarb drip, ABG pH 7.35, bicarb dc'ed and LR started  - advance GI following, s/p ERCP with biliary sphincterotomy 8/25. A 10 F x 7 cm plastic CBD stent was placed with adequate drainage. A 5 F x 4 cm plastic pancreatic stent was placed     # Early Liver Cirrhosis  - CT A/P: Mild lobulated contour of the liver. Early cirrhosis should be considered  - acute/chronic liver disease w/u  - Follow up with hepatology as outpatient    #CKD IIIb   - IVF with bicarb  - Cr 2.1    #right lung effusion   - CT chest 8/24 shows left upper lobe calcified granulomas  - right lower lobe effusion  - CXR 8/25: stable bibasilar opacities  - f/u quantiferon  - consider pulm consult    #H/o CAD/HF -stable   #HTN / Dyslipidemia   - hold statin / Edarbi/ Verquvo for now     DVT prophylaxis: hsq   76 yo Cantonese speaking  male with a pmh of CHF unknown ef, hypertension, hyperlipidemia, CAD, CKD, CVA 8 years ago, history of GI bleed per wife??? presenting for c/o n/v/subjective fevers that started this morning. pt has experienced 5+ episodes of NBNB emesis and abdominal and chest pain. + constipation. Denies diarrhea, sore throat, runny nose. reports worsening difficulty urinating over past day.    # Sepsis on admission  # NAGMA  # R/O Choledocholithiasis/ Cholangitis   - RUQ US: Cholelithiasis without sonographic evidence of acute cholecystitis. Echogenic gallbladder focus measuring up to 7 mm, difficult to differentiate between stone and polyp.   - CT abd/pelv: No evidence of acute abdominal or pelvic inflammatory process.   - labs on admission 8/23: Alb: 2.9 g/dL / Pro: 5.3 g/dL / ALK PHOS: 154 U/L / ALT: 226 U/L / AST: 189 U/L / GGT: x  - s/p cefepime and vanco for broad coverage  - ID consulted, recs appreciated    - c/w iv cefepime q24h    - bcx ntd    - parasite smear prelim neg,  wnl    - HIV neg, Hep neg, RVP neg    - f/u quantiferon  - s/p bicarb drip, ABG pH 7.35, bicarb dc'ed and LR started  - advance GI following, s/p ERCP with biliary sphincterotomy 8/25. A 10 F x 7 cm plastic CBD stent was placed with adequate drainage. A 5 F x 4 cm plastic pancreatic stent was placed     # Early Liver Cirrhosis  - CT A/P: Mild lobulated contour of the liver. Early cirrhosis should be considered  - acute/chronic liver disease w/u  - Follow up with hepatology as outpatient    #CKD IIIb   - IVF with bicarb  - Cr 2.1    #right lung effusion   - CT chest 8/24 shows left upper lobe calcified granulomas  - right lower lobe effusion  - CXR 8/25: stable bibasilar opacities  - f/u quantiferon  - consider pulm consult    #H/o CAD/HF -stable     #HTN / Dyslipidemia   - hold statin / Edarbi/ Verquvo for now   - on clonidine patch  - start nifedipine 30mg qd    DVT prophylaxis: hsq

## 2023-08-26 NOTE — PROGRESS NOTE ADULT - ASSESSMENT
Assessment and Plan  Case of a 77 year old Cantonese speaking male patient with a history of CHF unknown EF, HTN, DL, CAD, CKD, CVA 8 years ago, history of GI bleed, who presented to the ED on 08/23 for nausea, vomiting and fever, admitted for possible cholangitis. We are consulted for concern of cholangitis.       Choledocholithiasis  Cholangitis   * Patient is being followed status day 1 post ERCP with biliary sphincterotomy.  A 10 F x 7 cm plastic CBD stent was placed with adequate drainage. A 5 F x 4 cm plastic pancreatic stent was placed  * Sepsis admission   * Vitals stable  * Labs reviewed with stable WBC and Hb; LFTs improved except for ALP almost stable  * LIVER FUNCTIONS - ( 24 Aug 2023 13:30 ) Alb: 2.9 g/dL / Pro: 5.3 g/dL / ALK PHOS: 154 U/L / ALT: 226 U/L / AST: 189 U/L / GGT: x         * LIVER FUNCTIONS - (08.26.23 @ 07:05) Bilirubin Total: 2.0 mg/dL; Alkaline Phosphatase: 206 U/L; Aspartate Aminotransferase (AST/SGOT): 57 U/L; Alanine Aminotransferase (ALT/SGPT): 117 U/L  * RUQ US: Cholelithiasis without sonographic evidence of acute cholecystitis. Echogenic gallbladder focus measuring up to 7 mm, difficult to differentiate between stone and polyp.   * CT abd/pelv: No evidence of acute abdominal or pelvic inflammatory process.   * PE: no abdominal tenderness   * Patient denies nausea, vomiting, increased epigastric pain; wife notes new cough    RECOMMENDATIONS  - Recommend starting clear liquid diet  - Repeat LFTs today  - Continue IV fluid hydration   - Continue IV Cefepime  - Check CXR   - Encourage incentive spirometry  - Pain control   - Antiemetics  - Will need follow up in office in 4 weeks.      Liver Cirrhosis  * LIVER FUNCTIONS - ( 24 Aug 2023 13:30 ) Alb: 2.9 g/dL / Pro: 5.3 g/dL / ALK PHOS: 154 U/L / ALT: 226 U/L / AST: 189 U/L / GGT: x         * LIVER FUNCTIONS - (08.26.23 @ 07:05) Bilirubin Total: 2.0 mg/dL; Alkaline Phosphatase: 206 U/L; Aspartate Aminotransferase (AST/SGOT): 57 U/L; Alanine Aminotransferase (ALT/SGPT): 117 U/L  * Prothrombin Time and INR, Plasma in AM (08.26.23 @ 07:05) INR: 1.01  * Acute Hepatitis Panel (08.24.23 @ 15:45)   Hepatitis C Virus S/CO Ratio: 0.07 S/CO   Hepatitis B Core IgM Antibody: Nonreact   Hepatitis B Surface Antigen: Nonreact   Hepatitis A IgM Antibody: Nonreact  * CT A/P: Mild lobulated contour of the liver. Early cirrhosis should be considered.    RECOMMENDATIONS  - Trend LFTs and INR  - Follow up with our [facility] located at [address] [place] [address], [facility]. Telephone No: [phone]  - Need HCC screening  - Needs follow up to ensure patient's vaccination status is uptodate  - Will need outpatient follow up for EGD for variceal screening      Thank you for your consult.  - Please note that plan was communicated with medical team.   - Please reach GI on [phone] during weekdays till 5pm.  - Please call the GI service line after 5pm on Weekdays and anytime on Weekends: [phone].      Yaw Munoz MD  PGY - 4 Gastroenterology Fellow   [facility]

## 2023-08-26 NOTE — PROGRESS NOTE ADULT - SUBJECTIVE AND OBJECTIVE BOX
Patient is a 77y old  Male who presents with a chief complaint of fever (25 Aug 2023 18:17)      Patient seen and examined at bedside.  Patient reports overall feeling better, denies any chest pain or shortness of breath   ALLERGIES:  No Known Allergies    MEDICATIONS:  budesonide 160 MICROgram(s)/formoterol 4.5 MICROgram(s) Inhaler 2 Puff(s) Inhalation two times a day  cefepime   IVPB 1000 milliGRAM(s) IV Intermittent every 24 hours  chlorhexidine 2% Cloths 1 Application(s) Topical <User Schedule>  cloNIDine Patch 0.2 mG/24Hr(s) 1 patch Transdermal every 7 days  cyanocobalamin 1000 MICROGram(s) Oral daily  heparin   Injectable 5000 Unit(s) SubCutaneous every 12 hours  ketotifen 0.025% Ophthalmic Solution 1 Drop(s) Both EYES daily  lactated ringers. 1000 milliLiter(s) IV Continuous <Continuous>  multivitamin 1 Tablet(s) Oral daily  NIFEdipine XL 30 milliGRAM(s) Oral daily  ondansetron Injectable 4 milliGRAM(s) IV Push every 8 hours PRN  pantoprazole    Tablet 40 milliGRAM(s) Oral before breakfast  polyethylene glycol 3350 17 Gram(s) Oral two times a day  senna 2 Tablet(s) Oral at bedtime  tamsulosin 0.4 milliGRAM(s) Oral at bedtime    Vital Signs Last 24 Hrs  T(F): 97.9 (26 Aug 2023 08:00), Max: 99 (25 Aug 2023 17:05)  HR: 72 (26 Aug 2023 08:00) (59 - 91)  BP: 166/77 (26 Aug 2023 08:00) (164/77 - 205/84)  RR: 18 (26 Aug 2023 08:00) (17 - 18)  SpO2: 94% (25 Aug 2023 19:51) (94% - 100%)  I&O's Summary    25 Aug 2023 07:01  -  26 Aug 2023 07:00  --------------------------------------------------------  IN: 500 mL / OUT: 0 mL / NET: 500 mL        PHYSICAL EXAM:  General: NAD, A/O x 3  ENT: MMM  Neck: Supple, No JVD  Lungs: scattered right lung crackles   Cardio: RRR, S1/S2, No murmurs  Abdomen: Soft, Nontender, Nondistended; Bowel sounds present  Extremities: No cyanosis, No edema    LABS:                        9.7    3.71  )-----------( 74       ( 26 Aug 2023 07:05 )             28.3     08-26    137  |  107  |  31  ----------------------------<  130  5.1   |  19  |  1.7    Ca    8.4      26 Aug 2023 07:05  Mg     1.8     08-26    TPro  5.2  /  Alb  2.8  /  TBili  2.0  /  DBili  x   /  AST  57  /  ALT  117  /  AlkPhos  206  08-26      PT/INR - ( 26 Aug 2023 07:05 )   PT: 11.50 sec;   INR: 1.01 ratio           Lactate, Blood: 1.2 mmol/L (08-23 @ 23:09)    CARDIAC MARKERS ( 25 Aug 2023 07:29 )  x     / x     / 177 U/L / x     / x                ABG - ( 25 Aug 2023 11:34 )  pH, Arterial: 7.36  pH, Blood: x     /  pCO2: 36    /  pO2: 91    / HCO3: 20    / Base Excess: -4.5  /  SaO2: 98.7                    POCT Blood Glucose.: 131 mg/dL (26 Aug 2023 11:40)  POCT Blood Glucose.: 119 mg/dL (26 Aug 2023 08:11)  POCT Blood Glucose.: 111 mg/dL (25 Aug 2023 21:35)      Urinalysis Basic - ( 26 Aug 2023 07:05 )    Color: x / Appearance: x / SG: x / pH: x  Gluc: 130 mg/dL / Ketone: x  / Bili: x / Urobili: x   Blood: x / Protein: x / Nitrite: x   Leuk Esterase: x / RBC: x / WBC x   Sq Epi: x / Non Sq Epi: x / Bacteria: x        Culture - Urine (collected 23 Aug 2023 21:05)  Source: Clean Catch Clean Catch (Midstream)  Final Report (24 Aug 2023 22:19):    <10,000 CFU/mL Normal Urogenital Janny    Culture - Blood (collected 23 Aug 2023 19:49)  Source: .Blood Blood  Preliminary Report (26 Aug 2023 02:02):    No growth at 48 Hours    Culture - Blood (collected 23 Aug 2023 19:49)  Source: .Blood Blood  Preliminary Report (26 Aug 2023 02:02):    No growth at 48 Hours          RADIOLOGY & ADDITIONAL TESTS:    Care Discussed with Consultants/Other Providers:

## 2023-08-26 NOTE — PROGRESS NOTE ADULT - SUBJECTIVE AND OBJECTIVE BOX
SUBJECTIVE:    Patient is a 77y old Male who presents with a chief complaint of fever (25 Aug 2023 18:17)    Currently admitted to medicine with the primary diagnosis of Transaminitis       Today is hospital day 3d. This morning he is resting comfortably in bed and reports no new issues or overnight events.     PAST MEDICAL & SURGICAL HISTORY  HTN (hypertension)    CAD (coronary artery disease)    Acute CHF    Cerebrovascular accident (CVA)    Chronic kidney disease, unspecified CKD stage      SOCIAL HISTORY:  Negative for smoking/alcohol/drug use.     ALLERGIES:  No Known Allergies    MEDICATIONS:  STANDING MEDICATIONS  budesonide 160 MICROgram(s)/formoterol 4.5 MICROgram(s) Inhaler 2 Puff(s) Inhalation two times a day  cefepime   IVPB 1000 milliGRAM(s) IV Intermittent every 24 hours  chlorhexidine 2% Cloths 1 Application(s) Topical <User Schedule>  cloNIDine Patch 0.2 mG/24Hr(s) 1 patch Transdermal every 7 days  cyanocobalamin 1000 MICROGram(s) Oral daily  heparin   Injectable 5000 Unit(s) SubCutaneous every 12 hours  ketotifen 0.025% Ophthalmic Solution 1 Drop(s) Both EYES daily  lactated ringers. 1000 milliLiter(s) IV Continuous <Continuous>  multivitamin 1 Tablet(s) Oral daily  pantoprazole    Tablet 40 milliGRAM(s) Oral before breakfast  polyethylene glycol 3350 17 Gram(s) Oral two times a day  senna 2 Tablet(s) Oral at bedtime  tamsulosin 0.4 milliGRAM(s) Oral at bedtime    PRN MEDICATIONS  ondansetron Injectable 4 milliGRAM(s) IV Push every 8 hours PRN    VITALS:   T(F): 98.2  HR: 73  BP: 168/77  RR: 18  SpO2: 94%    LABS:                        10.1   5.73  )-----------( 73       ( 25 Aug 2023 07:29 )             30.2     08-25    137  |  108  |  32<H>  ----------------------------<  107<H>  4.3   |  18  |  2.1<H>    Ca    8.4      25 Aug 2023 07:29  Mg     1.9     08-25    TPro  5.6<L>  /  Alb  3.2<L>  /  TBili  2.6<H>  /  DBili  x   /  AST  109<H>  /  ALT  173<H>  /  AlkPhos  180<H>  08-25      Urinalysis Basic - ( 25 Aug 2023 07:29 )    Color: x / Appearance: x / SG: x / pH: x  Gluc: 107 mg/dL / Ketone: x  / Bili: x / Urobili: x   Blood: x / Protein: x / Nitrite: x   Leuk Esterase: x / RBC: x / WBC x   Sq Epi: x / Non Sq Epi: x / Bacteria: x      ABG - ( 25 Aug 2023 11:34 )  pH, Arterial: 7.36  pH, Blood: x     /  pCO2: 36    /  pO2: 91    / HCO3: 20    / Base Excess: -4.5  /  SaO2: 98.7                  Culture - Urine (collected 23 Aug 2023 21:05)  Source: Clean Catch Clean Catch (Midstream)  Final Report (24 Aug 2023 22:19):    <10,000 CFU/mL Normal Urogenital Janny    Culture - Blood (collected 23 Aug 2023 19:49)  Source: .Blood Blood  Preliminary Report (26 Aug 2023 02:02):    No growth at 48 Hours    Culture - Blood (collected 23 Aug 2023 19:49)  Source: .Blood Blood  Preliminary Report (26 Aug 2023 02:02):    No growth at 48 Hours      CARDIAC MARKERS ( 25 Aug 2023 07:29 )  x     / x     / 177 U/L / x     / x          RADIOLOGY:    PHYSICAL EXAM:  GEN: No acute distress  LUNGS: crackles  HEART: irregular  ABD: Soft, non-tender, non-distended.  EXT: NC/NC/NE/2+PP/PLUMMER/Skin Intact.   NEURO: AAOX3   SUBJECTIVE:    Patient is a 77y old Male who presents with a chief complaint of fever (25 Aug 2023 18:17)     Today is hospital day 3d. JOSE. This morning patient family at bedside, reported that patient had some yellow sputum overnight, afebrile. BP was high this morning, patient received po labetalol 100mg overnight per high BP.      PAST MEDICAL & SURGICAL HISTORY  HTN (hypertension)    CAD (coronary artery disease)    Acute CHF    Cerebrovascular accident (CVA)    Chronic kidney disease, unspecified CKD stage      SOCIAL HISTORY:  Negative for smoking/alcohol/drug use.     ALLERGIES:  No Known Allergies    MEDICATIONS:  STANDING MEDICATIONS  budesonide 160 MICROgram(s)/formoterol 4.5 MICROgram(s) Inhaler 2 Puff(s) Inhalation two times a day  cefepime   IVPB 1000 milliGRAM(s) IV Intermittent every 24 hours  chlorhexidine 2% Cloths 1 Application(s) Topical <User Schedule>  cloNIDine Patch 0.2 mG/24Hr(s) 1 patch Transdermal every 7 days  cyanocobalamin 1000 MICROGram(s) Oral daily  heparin   Injectable 5000 Unit(s) SubCutaneous every 12 hours  ketotifen 0.025% Ophthalmic Solution 1 Drop(s) Both EYES daily  lactated ringers. 1000 milliLiter(s) IV Continuous <Continuous>  multivitamin 1 Tablet(s) Oral daily  pantoprazole    Tablet 40 milliGRAM(s) Oral before breakfast  polyethylene glycol 3350 17 Gram(s) Oral two times a day  senna 2 Tablet(s) Oral at bedtime  tamsulosin 0.4 milliGRAM(s) Oral at bedtime    PRN MEDICATIONS  ondansetron Injectable 4 milliGRAM(s) IV Push every 8 hours PRN    VITALS:   T(F): 98.2  HR: 73  BP: 168/77  RR: 18  SpO2: 94%    LABS:                        10.1   5.73  )-----------( 73       ( 25 Aug 2023 07:29 )             30.2     08-25    137  |  108  |  32<H>  ----------------------------<  107<H>  4.3   |  18  |  2.1<H>    Ca    8.4      25 Aug 2023 07:29  Mg     1.9     08-25    TPro  5.6<L>  /  Alb  3.2<L>  /  TBili  2.6<H>  /  DBili  x   /  AST  109<H>  /  ALT  173<H>  /  AlkPhos  180<H>  08-25      Urinalysis Basic - ( 25 Aug 2023 07:29 )    Color: x / Appearance: x / SG: x / pH: x  Gluc: 107 mg/dL / Ketone: x  / Bili: x / Urobili: x   Blood: x / Protein: x / Nitrite: x   Leuk Esterase: x / RBC: x / WBC x   Sq Epi: x / Non Sq Epi: x / Bacteria: x      ABG - ( 25 Aug 2023 11:34 )  pH, Arterial: 7.36  pH, Blood: x     /  pCO2: 36    /  pO2: 91    / HCO3: 20    / Base Excess: -4.5  /  SaO2: 98.7                  Culture - Urine (collected 23 Aug 2023 21:05)  Source: Clean Catch Clean Catch (Midstream)  Final Report (24 Aug 2023 22:19):    <10,000 CFU/mL Normal Urogenital Janny    Culture - Blood (collected 23 Aug 2023 19:49)  Source: .Blood Blood  Preliminary Report (26 Aug 2023 02:02):    No growth at 48 Hours    Culture - Blood (collected 23 Aug 2023 19:49)  Source: .Blood Blood  Preliminary Report (26 Aug 2023 02:02):    No growth at 48 Hours      CARDIAC MARKERS ( 25 Aug 2023 07:29 )  x     / x     / 177 U/L / x     / x          RADIOLOGY:    PHYSICAL EXAM:  GEN: No acute distress  Lungs: right sided basal and mid crackle   Cardio: RRR, S1/S2, No murmurs  ABD: Soft, non-tender, non-distended.  EXT: NC/NC/NE/2+PP/PLUMMER/Skin Intact.   NEURO: AAOX3

## 2023-08-27 LAB
ALBUMIN SERPL ELPH-MCNC: 2.7 G/DL — LOW (ref 3.5–5.2)
ALP SERPL-CCNC: 185 U/L — HIGH (ref 30–115)
ALT FLD-CCNC: 87 U/L — HIGH (ref 0–41)
ANION GAP SERPL CALC-SCNC: 9 MMOL/L — SIGNIFICANT CHANGE UP (ref 7–14)
AST SERPL-CCNC: 40 U/L — SIGNIFICANT CHANGE UP (ref 0–41)
BASOPHILS # BLD AUTO: 0 K/UL — SIGNIFICANT CHANGE UP (ref 0–0.2)
BASOPHILS NFR BLD AUTO: 0 % — SIGNIFICANT CHANGE UP (ref 0–1)
BILIRUB SERPL-MCNC: 1.2 MG/DL — SIGNIFICANT CHANGE UP (ref 0.2–1.2)
BUN SERPL-MCNC: 38 MG/DL — HIGH (ref 10–20)
CALCIUM SERPL-MCNC: 8.6 MG/DL — SIGNIFICANT CHANGE UP (ref 8.4–10.5)
CHLORIDE SERPL-SCNC: 108 MMOL/L — SIGNIFICANT CHANGE UP (ref 98–110)
CO2 SERPL-SCNC: 21 MMOL/L — SIGNIFICANT CHANGE UP (ref 17–32)
CREAT SERPL-MCNC: 1.4 MG/DL — SIGNIFICANT CHANGE UP (ref 0.7–1.5)
EGFR: 52 ML/MIN/1.73M2 — LOW
EOSINOPHIL # BLD AUTO: 0.06 K/UL — SIGNIFICANT CHANGE UP (ref 0–0.7)
EOSINOPHIL NFR BLD AUTO: 1.1 % — SIGNIFICANT CHANGE UP (ref 0–8)
GLUCOSE BLDC GLUCOMTR-MCNC: 100 MG/DL — HIGH (ref 70–99)
GLUCOSE BLDC GLUCOMTR-MCNC: 106 MG/DL — HIGH (ref 70–99)
GLUCOSE BLDC GLUCOMTR-MCNC: 129 MG/DL — HIGH (ref 70–99)
GLUCOSE BLDC GLUCOMTR-MCNC: 97 MG/DL — SIGNIFICANT CHANGE UP (ref 70–99)
GLUCOSE SERPL-MCNC: 100 MG/DL — HIGH (ref 70–99)
HCT VFR BLD CALC: 26.5 % — LOW (ref 42–52)
HGB BLD-MCNC: 9 G/DL — LOW (ref 14–18)
IMM GRANULOCYTES NFR BLD AUTO: 0.9 % — HIGH (ref 0.1–0.3)
LYMPHOCYTES # BLD AUTO: 0.47 K/UL — LOW (ref 1.2–3.4)
LYMPHOCYTES # BLD AUTO: 8.7 % — LOW (ref 20.5–51.1)
MAGNESIUM SERPL-MCNC: 1.8 MG/DL — SIGNIFICANT CHANGE UP (ref 1.8–2.4)
MCHC RBC-ENTMCNC: 31 PG — SIGNIFICANT CHANGE UP (ref 27–31)
MCHC RBC-ENTMCNC: 34 G/DL — SIGNIFICANT CHANGE UP (ref 32–37)
MCV RBC AUTO: 91.4 FL — SIGNIFICANT CHANGE UP (ref 80–94)
MONOCYTES # BLD AUTO: 0.47 K/UL — SIGNIFICANT CHANGE UP (ref 0.1–0.6)
MONOCYTES NFR BLD AUTO: 8.7 % — SIGNIFICANT CHANGE UP (ref 1.7–9.3)
NEUTROPHILS # BLD AUTO: 4.37 K/UL — SIGNIFICANT CHANGE UP (ref 1.4–6.5)
NEUTROPHILS NFR BLD AUTO: 80.6 % — HIGH (ref 42.2–75.2)
NRBC # BLD: 0 /100 WBCS — SIGNIFICANT CHANGE UP (ref 0–0)
PLATELET # BLD AUTO: 83 K/UL — LOW (ref 130–400)
PMV BLD: 11.6 FL — HIGH (ref 7.4–10.4)
POTASSIUM SERPL-MCNC: 4.8 MMOL/L — SIGNIFICANT CHANGE UP (ref 3.5–5)
POTASSIUM SERPL-SCNC: 4.8 MMOL/L — SIGNIFICANT CHANGE UP (ref 3.5–5)
PROT SERPL-MCNC: 5.1 G/DL — LOW (ref 6–8)
RBC # BLD: 2.9 M/UL — LOW (ref 4.7–6.1)
RBC # FLD: 14.3 % — SIGNIFICANT CHANGE UP (ref 11.5–14.5)
SODIUM SERPL-SCNC: 138 MMOL/L — SIGNIFICANT CHANGE UP (ref 135–146)
WBC # BLD: 5.42 K/UL — SIGNIFICANT CHANGE UP (ref 4.8–10.8)
WBC # FLD AUTO: 5.42 K/UL — SIGNIFICANT CHANGE UP (ref 4.8–10.8)

## 2023-08-27 PROCEDURE — 99232 SBSQ HOSP IP/OBS MODERATE 35: CPT

## 2023-08-27 RX ADMIN — PREGABALIN 1000 MICROGRAM(S): 225 CAPSULE ORAL at 11:10

## 2023-08-27 RX ADMIN — CEFEPIME 100 MILLIGRAM(S): 1 INJECTION, POWDER, FOR SOLUTION INTRAMUSCULAR; INTRAVENOUS at 21:17

## 2023-08-27 RX ADMIN — PANTOPRAZOLE SODIUM 40 MILLIGRAM(S): 20 TABLET, DELAYED RELEASE ORAL at 06:11

## 2023-08-27 RX ADMIN — HEPARIN SODIUM 5000 UNIT(S): 5000 INJECTION INTRAVENOUS; SUBCUTANEOUS at 17:12

## 2023-08-27 RX ADMIN — CHLORHEXIDINE GLUCONATE 1 APPLICATION(S): 213 SOLUTION TOPICAL at 06:09

## 2023-08-27 RX ADMIN — HEPARIN SODIUM 5000 UNIT(S): 5000 INJECTION INTRAVENOUS; SUBCUTANEOUS at 06:10

## 2023-08-27 RX ADMIN — TAMSULOSIN HYDROCHLORIDE 0.4 MILLIGRAM(S): 0.4 CAPSULE ORAL at 21:17

## 2023-08-27 RX ADMIN — Medication 1 PATCH: at 07:59

## 2023-08-27 RX ADMIN — Medication 30 MILLIGRAM(S): at 06:11

## 2023-08-27 RX ADMIN — SENNA PLUS 2 TABLET(S): 8.6 TABLET ORAL at 21:17

## 2023-08-27 RX ADMIN — Medication 1 TABLET(S): at 11:10

## 2023-08-27 RX ADMIN — Medication 1 PATCH: at 19:04

## 2023-08-27 RX ADMIN — POLYETHYLENE GLYCOL 3350 17 GRAM(S): 17 POWDER, FOR SOLUTION ORAL at 06:11

## 2023-08-27 RX ADMIN — KETOTIFEN FUMARATE 1 DROP(S): 0.34 SOLUTION OPHTHALMIC at 11:10

## 2023-08-27 RX ADMIN — POLYETHYLENE GLYCOL 3350 17 GRAM(S): 17 POWDER, FOR SOLUTION ORAL at 17:12

## 2023-08-27 NOTE — PHYSICAL THERAPY INITIAL EVALUATION ADULT - PERTINENT HX OF CURRENT PROBLEM, REHAB EVAL
76 yo Cantonese speaking  male ( used) with a pmh of CHF unknown ef, hypertension, hyperlipidemia, CAD, CKD, CVA 8 years ago, history of GI bleed per wife??? presenting for c/o n/v/subjective fevers that started this morning. pt has experienced 5+ episodes of NBNB emesis and abdominal and chest pain. + constipation. Denies diarrhea, sore throat, runny nose. reports worsening difficulty urinating over past day.

## 2023-08-27 NOTE — PROGRESS NOTE ADULT - SUBJECTIVE AND OBJECTIVE BOX
Patient is a 77y old  Male who presents with a chief complaint of fever (26 Aug 2023 14:36)      Patient seen and examined at bedside.  Patient denies any chest pain shortness of breath   ALLERGIES:  No Known Allergies    MEDICATIONS:  budesonide 160 MICROgram(s)/formoterol 4.5 MICROgram(s) Inhaler 2 Puff(s) Inhalation two times a day  cefepime   IVPB 1000 milliGRAM(s) IV Intermittent every 24 hours  chlorhexidine 2% Cloths 1 Application(s) Topical <User Schedule>  cloNIDine Patch 0.2 mG/24Hr(s) 1 patch Transdermal every 7 days  cyanocobalamin 1000 MICROGram(s) Oral daily  heparin   Injectable 5000 Unit(s) SubCutaneous every 12 hours  ketotifen 0.025% Ophthalmic Solution 1 Drop(s) Both EYES daily  lactated ringers. 1000 milliLiter(s) IV Continuous <Continuous>  multivitamin 1 Tablet(s) Oral daily  NIFEdipine XL 30 milliGRAM(s) Oral daily  ondansetron Injectable 4 milliGRAM(s) IV Push every 8 hours PRN  pantoprazole    Tablet 40 milliGRAM(s) Oral before breakfast  polyethylene glycol 3350 17 Gram(s) Oral two times a day  senna 2 Tablet(s) Oral at bedtime  tamsulosin 0.4 milliGRAM(s) Oral at bedtime    Vital Signs Last 24 Hrs  T(F): 97.3 (27 Aug 2023 07:00), Max: 97.6 (26 Aug 2023 15:00)  HR: 62 (27 Aug 2023 07:00) (62 - 72)  BP: 127/58 (27 Aug 2023 07:00) (126/59 - 141/63)  RR: 18 (27 Aug 2023 07:00) (18 - 18)  SpO2: --  I&O's Summary      PHYSICAL EXAM:  General: NAD, A/O x 3  ENT: MMM  Neck: Supple, No JVD  Lungs: Clear to auscultation bilaterally  Cardio: RRR, S1/S2, No murmurs  Abdomen: Soft, Nontender, Nondistended; Bowel sounds present  Extremities: No cyanosis, hands +2 edema    LABS:                        9.0    5.42  )-----------( 83       ( 27 Aug 2023 07:59 )             26.5     08-27    138  |  108  |  38  ----------------------------<  100  4.8   |  21  |  1.4    Ca    8.6      27 Aug 2023 07:59  Mg     1.8     08-27    TPro  5.1  /  Alb  2.7  /  TBili  1.2  /  DBili  x   /  AST  40  /  ALT  87  /  AlkPhos  185  08-27      PT/INR - ( 26 Aug 2023 07:05 )   PT: 11.50 sec;   INR: 1.01 ratio             CARDIAC MARKERS ( 25 Aug 2023 07:29 )  x     / x     / 177 U/L / x     / x                ABG - ( 25 Aug 2023 11:34 )  pH, Arterial: 7.36  pH, Blood: x     /  pCO2: 36    /  pO2: 91    / HCO3: 20    / Base Excess: -4.5  /  SaO2: 98.7                    POCT Blood Glucose.: 106 mg/dL (27 Aug 2023 11:26)  POCT Blood Glucose.: 100 mg/dL (27 Aug 2023 08:03)  POCT Blood Glucose.: 144 mg/dL (26 Aug 2023 22:45)  POCT Blood Glucose.: 125 mg/dL (26 Aug 2023 16:33)      Urinalysis Basic - ( 27 Aug 2023 07:59 )    Color: x / Appearance: x / SG: x / pH: x  Gluc: 100 mg/dL / Ketone: x  / Bili: x / Urobili: x   Blood: x / Protein: x / Nitrite: x   Leuk Esterase: x / RBC: x / WBC x   Sq Epi: x / Non Sq Epi: x / Bacteria: x        Culture - Urine (collected 23 Aug 2023 21:05)  Source: Clean Catch Clean Catch (Midstream)  Final Report (24 Aug 2023 22:19):    <10,000 CFU/mL Normal Urogenital Janny    Culture - Blood (collected 23 Aug 2023 19:49)  Source: .Blood Blood  Preliminary Report (27 Aug 2023 02:01):    No growth at 72 Hours    Culture - Blood (collected 23 Aug 2023 19:49)  Source: .Blood Blood  Preliminary Report (27 Aug 2023 02:01):    No growth at 72 Hours          RADIOLOGY & ADDITIONAL TESTS:    Care Discussed with Consultants/Other Providers:

## 2023-08-27 NOTE — PHYSICAL THERAPY INITIAL EVALUATION ADULT - LIVES WITH, PROFILE
daughter and her family. Adjacent Tissue Transfer Text: The defect edges were debeveled with a #15 scalpel blade and curved iris scissors.  Given the location of the defect, geometric constraints and functional constraints, a flap reconstruction was performed to reconstruct the defect which including incising tissue and carrying tissue over to close the defect.  An adjacent tissue transfer was deemed most appropriate.  Using a surgical marker an appropriate flap was drawn incorporating the defect and placing the expected incisions within the relaxed skin tension lines where possible.    The area thus outlined was incised deep to adipose tissue with a #15 scalpel blade.  The skin margins were undermined to an appropriate distance in all directions utilizing iris scissors.  The flap was incised, elevated, and carried over to cover the defect.

## 2023-08-27 NOTE — PHYSICAL THERAPY INITIAL EVALUATION ADULT - ADDITIONAL COMMENTS
As per grandson, patient lives with daughter and her family. Was independent in ADLs and ambulation using straight cane.

## 2023-08-27 NOTE — PROGRESS NOTE ADULT - ASSESSMENT
78 yo Cantonese speaking  male with a pmh of CHF unknown ef, hypertension, hyperlipidemia, CAD, CKD, CVA 8 years ago, history of GI bleed per wife??? presenting for c/o n/v/subjective fevers that started this morning. pt has experienced 5+ episodes of NBNB emesis and abdominal and chest pain. + constipation. Denies diarrhea, sore throat, runny nose. reports worsening difficulty urinating over past day.    # Sepsis on admission  # NAGMA  # R/O Choledocholithiasis/ Cholangitis   - RUQ US: Cholelithiasis without sonographic evidence of acute cholecystitis. Echogenic gallbladder focus measuring up to 7 mm, difficult to differentiate between stone and polyp.   - CT abd/pelv: No evidence of acute abdominal or pelvic inflammatory process.   - ID consulted, recs appreciated   - c/w iv cefepime q24h - 8/27 spoke with Dr. Rene MARCELINO - recommended 8/28 could be last day of antibiotics    - bcx ntd   - parasite smear prelim neg, f/u LDH   - HIV neg, Hep neg   - rvp negative    - f/u quantiferon  - [facility] following, - ERCP 8/25 with biliary sphincterotomy.  A 10 F x 7 cm plastic CBD stent was placed with adequate drainage. A 5 F x 4 cm plastic pancreatic stent was placed  - transaminitis improving   - today increased to full liquids - spoke to GI fellow [place] to increase diet as tolerated    # Early Liver Cirrhosis  - CT A/P: Mild lobulated contour of the liver. Early cirrhosis should be considered  - acute/chronic liver disease w/u  - Follow up with hepatology as outpatient    #CKD IIIb   - IVF with bicarb stopped 8/25  - Cr 2.1    #right lung effusion   -CT chest 8/24 shows left upper lobe calcified granulomas  -right lower lobe effusion  -quantiferon in progress  - need to discuss with primary ID attending if this issue can continue to be worked up as outpatient or if it needs to be completed in hospital   -procal 19    #H/o CAD/HF -stable   #HTN / Dyslipidemia   - hold statin / Edarbi/ Verquvo for now   -currently stable on niefidipine     DVT prophylaxis: hsq  Pending: diet tolerance,  quantiferion  Dispo: subacute rehab possibly 8/28

## 2023-08-28 ENCOUNTER — TRANSCRIPTION ENCOUNTER (OUTPATIENT)
Age: 78
End: 2023-08-28

## 2023-08-28 VITALS
TEMPERATURE: 98 F | RESPIRATION RATE: 18 BRPM | SYSTOLIC BLOOD PRESSURE: 133 MMHG | DIASTOLIC BLOOD PRESSURE: 61 MMHG | HEART RATE: 64 BPM

## 2023-08-28 LAB
ALBUMIN SERPL ELPH-MCNC: 3 G/DL — LOW (ref 3.5–5.2)
ALP SERPL-CCNC: 204 U/L — HIGH (ref 30–115)
ALT FLD-CCNC: 94 U/L — HIGH (ref 0–41)
ANION GAP SERPL CALC-SCNC: 10 MMOL/L — SIGNIFICANT CHANGE UP (ref 7–14)
AST SERPL-CCNC: 66 U/L — HIGH (ref 0–41)
BASOPHILS # BLD AUTO: 0.02 K/UL — SIGNIFICANT CHANGE UP (ref 0–0.2)
BASOPHILS NFR BLD AUTO: 0.5 % — SIGNIFICANT CHANGE UP (ref 0–1)
BILIRUB SERPL-MCNC: 1.2 MG/DL — SIGNIFICANT CHANGE UP (ref 0.2–1.2)
BUN SERPL-MCNC: 34 MG/DL — HIGH (ref 10–20)
CALCIUM SERPL-MCNC: 8.9 MG/DL — SIGNIFICANT CHANGE UP (ref 8.4–10.5)
CHLORIDE SERPL-SCNC: 106 MMOL/L — SIGNIFICANT CHANGE UP (ref 98–110)
CO2 SERPL-SCNC: 21 MMOL/L — SIGNIFICANT CHANGE UP (ref 17–32)
CREAT SERPL-MCNC: 1.5 MG/DL — SIGNIFICANT CHANGE UP (ref 0.7–1.5)
EGFR: 48 ML/MIN/1.73M2 — LOW
EOSINOPHIL # BLD AUTO: 0.1 K/UL — SIGNIFICANT CHANGE UP (ref 0–0.7)
EOSINOPHIL NFR BLD AUTO: 2.4 % — SIGNIFICANT CHANGE UP (ref 0–8)
GLUCOSE BLDC GLUCOMTR-MCNC: 116 MG/DL — HIGH (ref 70–99)
GLUCOSE BLDC GLUCOMTR-MCNC: 92 MG/DL — SIGNIFICANT CHANGE UP (ref 70–99)
GLUCOSE BLDC GLUCOMTR-MCNC: 98 MG/DL — SIGNIFICANT CHANGE UP (ref 70–99)
GLUCOSE SERPL-MCNC: 92 MG/DL — SIGNIFICANT CHANGE UP (ref 70–99)
HCT VFR BLD CALC: 29.8 % — LOW (ref 42–52)
HGB BLD-MCNC: 10.1 G/DL — LOW (ref 14–18)
IMM GRANULOCYTES NFR BLD AUTO: 1.5 % — HIGH (ref 0.1–0.3)
LYMPHOCYTES # BLD AUTO: 0.74 K/UL — LOW (ref 1.2–3.4)
LYMPHOCYTES # BLD AUTO: 18 % — LOW (ref 20.5–51.1)
MAGNESIUM SERPL-MCNC: 1.9 MG/DL — SIGNIFICANT CHANGE UP (ref 1.8–2.4)
MCHC RBC-ENTMCNC: 30.4 PG — SIGNIFICANT CHANGE UP (ref 27–31)
MCHC RBC-ENTMCNC: 33.9 G/DL — SIGNIFICANT CHANGE UP (ref 32–37)
MCV RBC AUTO: 89.8 FL — SIGNIFICANT CHANGE UP (ref 80–94)
MONOCYTES # BLD AUTO: 0.47 K/UL — SIGNIFICANT CHANGE UP (ref 0.1–0.6)
MONOCYTES NFR BLD AUTO: 11.5 % — HIGH (ref 1.7–9.3)
NEUTROPHILS # BLD AUTO: 2.71 K/UL — SIGNIFICANT CHANGE UP (ref 1.4–6.5)
NEUTROPHILS NFR BLD AUTO: 66.1 % — SIGNIFICANT CHANGE UP (ref 42.2–75.2)
NRBC # BLD: 0 /100 WBCS — SIGNIFICANT CHANGE UP (ref 0–0)
PLATELET # BLD AUTO: 100 K/UL — LOW (ref 130–400)
PMV BLD: 11.6 FL — HIGH (ref 7.4–10.4)
POTASSIUM SERPL-MCNC: 4.9 MMOL/L — SIGNIFICANT CHANGE UP (ref 3.5–5)
POTASSIUM SERPL-SCNC: 4.9 MMOL/L — SIGNIFICANT CHANGE UP (ref 3.5–5)
PROT SERPL-MCNC: 5.7 G/DL — LOW (ref 6–8)
RBC # BLD: 3.32 M/UL — LOW (ref 4.7–6.1)
RBC # FLD: 14.2 % — SIGNIFICANT CHANGE UP (ref 11.5–14.5)
SODIUM SERPL-SCNC: 137 MMOL/L — SIGNIFICANT CHANGE UP (ref 135–146)
WBC # BLD: 4.1 K/UL — LOW (ref 4.8–10.8)
WBC # FLD AUTO: 4.1 K/UL — LOW (ref 4.8–10.8)

## 2023-08-28 PROCEDURE — 99239 HOSP IP/OBS DSCHRG MGMT >30: CPT

## 2023-08-28 RX ORDER — PANTOPRAZOLE SODIUM 20 MG/1
1 TABLET, DELAYED RELEASE ORAL
Qty: 0 | Refills: 0 | DISCHARGE
Start: 2023-08-28

## 2023-08-28 RX ORDER — METRONIDAZOLE 500 MG
1 TABLET ORAL
Qty: 12 | Refills: 0
Start: 2023-08-28 | End: 2023-08-31

## 2023-08-28 RX ORDER — METRONIDAZOLE 500 MG
500 TABLET ORAL THREE TIMES A DAY
Refills: 0 | Status: DISCONTINUED | OUTPATIENT
Start: 2023-08-28 | End: 2023-08-28

## 2023-08-28 RX ORDER — POLYETHYLENE GLYCOL 3350 17 G/17G
17 POWDER, FOR SOLUTION ORAL
Qty: 0 | Refills: 0 | DISCHARGE
Start: 2023-08-28

## 2023-08-28 RX ORDER — LEVOFLOXACIN 5 MG/ML
1 INJECTION, SOLUTION INTRAVENOUS
Qty: 2 | Refills: 0
Start: 2023-08-28 | End: 2023-08-31

## 2023-08-28 RX ORDER — SENNA PLUS 8.6 MG/1
2 TABLET ORAL
Qty: 0 | Refills: 0 | DISCHARGE
Start: 2023-08-28

## 2023-08-28 RX ADMIN — Medication 1 TABLET(S): at 12:23

## 2023-08-28 RX ADMIN — Medication 500 MILLIGRAM(S): at 17:08

## 2023-08-28 RX ADMIN — Medication 1 PATCH: at 06:05

## 2023-08-28 RX ADMIN — Medication 30 MILLIGRAM(S): at 06:05

## 2023-08-28 RX ADMIN — PANTOPRAZOLE SODIUM 40 MILLIGRAM(S): 20 TABLET, DELAYED RELEASE ORAL at 06:05

## 2023-08-28 RX ADMIN — POLYETHYLENE GLYCOL 3350 17 GRAM(S): 17 POWDER, FOR SOLUTION ORAL at 17:09

## 2023-08-28 RX ADMIN — Medication 1 PATCH: at 05:05

## 2023-08-28 RX ADMIN — POLYETHYLENE GLYCOL 3350 17 GRAM(S): 17 POWDER, FOR SOLUTION ORAL at 06:06

## 2023-08-28 RX ADMIN — PREGABALIN 1000 MICROGRAM(S): 225 CAPSULE ORAL at 12:23

## 2023-08-28 RX ADMIN — CHLORHEXIDINE GLUCONATE 1 APPLICATION(S): 213 SOLUTION TOPICAL at 06:04

## 2023-08-28 RX ADMIN — HEPARIN SODIUM 5000 UNIT(S): 5000 INJECTION INTRAVENOUS; SUBCUTANEOUS at 17:08

## 2023-08-28 RX ADMIN — HEPARIN SODIUM 5000 UNIT(S): 5000 INJECTION INTRAVENOUS; SUBCUTANEOUS at 06:05

## 2023-08-28 NOTE — PROGRESS NOTE ADULT - TIME BILLING
I have personally seen and examined this patient.  I have reviewed all pertinent clinical information and reviewed all relevant imaging and diagnostic studies personally.   If possible, I counseled the patient about diagnostic testing and treatment plan.   I discussed my recommendations with the primary team.
Coordination of care
I have personally seen and examined this patient.  I have reviewed all pertinent clinical information and reviewed all relevant imaging and diagnostic studies personally.   If possible, I counseled the patient about diagnostic testing and treatment plan.   I discussed my recommendations with the primary team.

## 2023-08-28 NOTE — PROGRESS NOTE ADULT - PROVIDER SPECIALTY LIST ADULT
Internal Medicine
Internal Medicine
Gastroenterology
Hospitalist
Infectious Disease
Infectious Disease

## 2023-08-28 NOTE — DISCHARGE NOTE PROVIDER - NSDCCPCAREPLAN_GEN_ALL_CORE_FT
PRINCIPAL DISCHARGE DIAGNOSIS  Diagnosis: Cholangitis  Assessment and Plan of Treatment: You were found to have a stones in your biliary system, which were removed during ERCP. Stents were also placed to keep the biliary tracts open. Please return to the ED if you experience severe abdominal pain, high fever, bleeding, very low blood pressure. Please follow up with your doctors as referred and take medications as prescibed, including your new antibiotics.  Please follow up with your liver doctor to evaluate your liver for possible cirrhosis.      SECONDARY DISCHARGE DIAGNOSES  Diagnosis: Choledocholithiasis with acute cholecystitis  Assessment and Plan of Treatment:     Diagnosis: Cirrhosis  Assessment and Plan of Treatment:

## 2023-08-28 NOTE — PHARMACOTHERAPY INTERVENTION NOTE - COMMENTS
Recommended initiating metronidazole 500mg PO q8h as per ID consult recommendations.     Gianluca Crawford, PharmD  Clinical Pharmacy Specialist, Infectious Diseases  Tele-Antimicrobial Stewardship Program (Tele-ASP)  Tele-ASP Phone: [phone]

## 2023-08-28 NOTE — PROGRESS NOTE ADULT - SUBJECTIVE AND OBJECTIVE BOX
VALERORITIKA  77y, Male  Allergy: No Known Allergies      LOS  5d    CHIEF COMPLAINT: fever (27 Aug 2023 14:25)      INTERVAL EVENTS/HPI  - No acute events overnight  - T(F): , Max: 97.5 (08-27-23 @ 15:00)  - Tolerating medication  - WBC Count: 4.10 (08-28-23 @ 07:38)  WBC Count: 5.42 (08-27-23 @ 07:59)     - Creatinine: 1.5 (08-28-23 @ 07:38)  Creatinine: 1.4 (08-27-23 @ 07:59)       ROS  ***    VITALS:  T(F): 97, Max: 97.5 (08-27-23 @ 15:00)  HR: 74  BP: 119/63  RR: 18Vital Signs Last 24 Hrs  T(C): 36.1 (28 Aug 2023 07:00), Max: 36.4 (27 Aug 2023 15:00)  T(F): 97 (28 Aug 2023 07:00), Max: 97.5 (27 Aug 2023 15:00)  HR: 74 (28 Aug 2023 07:00) (62 - 74)  BP: 119/63 (28 Aug 2023 07:00) (105/57 - 121/57)  BP(mean): --  RR: 18 (28 Aug 2023 07:00) (18 - 18)  SpO2: --        PHYSICAL EXAM:  ***    FH: Non-contributory  Social Hx: Non-contributory    TESTS & MEASUREMENTS:                        10.1   4.10  )-----------( 100      ( 28 Aug 2023 07:38 )             29.8     08-28    137  |  106  |  34<H>  ----------------------------<  92  4.9   |  21  |  1.5    Ca    8.9      28 Aug 2023 07:38  Mg     1.9     08-28    TPro  5.7<L>  /  Alb  3.0<L>  /  TBili  1.2  /  DBili  x   /  AST  66<H>  /  ALT  94<H>  /  AlkPhos  204<H>  08-28      LIVER FUNCTIONS - ( 28 Aug 2023 07:38 )  Alb: 3.0 g/dL / Pro: 5.7 g/dL / ALK PHOS: 204 U/L / ALT: 94 U/L / AST: 66 U/L / GGT: x           Urinalysis Basic - ( 28 Aug 2023 07:38 )    Color: x / Appearance: x / SG: x / pH: x  Gluc: 92 mg/dL / Ketone: x  / Bili: x / Urobili: x   Blood: x / Protein: x / Nitrite: x   Leuk Esterase: x / RBC: x / WBC x   Sq Epi: x / Non Sq Epi: x / Bacteria: x        Culture - Urine (collected 08-23-23 @ 21:05)  Source: Clean Catch Clean Catch (Midstream)  Final Report (08-24-23 @ 22:19):    <10,000 CFU/mL Normal Urogenital Janny    Culture - Blood (collected 08-23-23 @ 19:49)  Source: .Blood Blood  Preliminary Report (08-28-23 @ 02:00):    No growth at 4 days    Culture - Blood (collected 08-23-23 @ 19:49)  Source: .Blood Blood  Preliminary Report (08-28-23 @ 02:00):    No growth at 4 days        Lactate, Blood: 1.2 mmol/L (08-23-23 @ 23:09)  Lactate, Blood: 2.7 mmol/L (08-23-23 @ 13:26)      INFECTIOUS DISEASES TESTING  Rapid RVP Result: NotDetec (08-25-23 @ 07:52)  Procalcitonin, Serum: 19.00 (08-25-23 @ 07:29)  Vancomycin Level, Random: 7.5 (08-25-23 @ 07:29)  HIV-1/2 Combo Result: Nonreact (08-24-23 @ 17:31)  Procalcitonin, Serum: 0.07 (01-19-23 @ 21:15)  COVID-19 PCR: Detected (01-19-23 @ 12:00)  strept    INFLAMMATORY MARKERS      RADIOLOGY & ADDITIONAL TESTS:  I have personally reviewed the last available Chest xray  CXR  Xray Chest 1 View AP/PA:   ACC: [id number] EXAM:  XR CHEST 1 VIEW   ORDERED BY: ARIEL ZAPATA     PROCEDURE DATE:  08/25/2023          INTERPRETATION:  Clinical History / Reason for exam: Concern for pneumonia    Comparison : Chest radiograph 2 days prior.    Technique/Positioning: AP frontal.    Findings:    Support devices: None.    Cardiac/mediastinum/hilum: Atherosclerotic aorta, stable.    Lung parenchyma/Pleura: Stable bibasilar opacities. Right upper lobe   fibrosis and bilateral pleural plaques which are better seen in prior CT.   A linear opacity is noted along the right lateral lung field consistent   with a thickened pleural stripe.    Skeleton/soft tissues: Unchanged.    Impression:    Stable bibasilar opacities.    --- End of Report ---          ANYA GONSALEZ; Resident Radiologist  This document has been electronically signed.  ELIER VIZCAINO MD; Attending Interventional Radiologist  This document has been electronically signed. Aug 25 2023  5:09PM (08-25-23 @ 15:47)      CT      CARDIOLOGY TESTING  12 Lead ECG:   Ventricular Rate 96 BPM    Atrial Rate 96 BPM    P-R Interval 154 ms    QRS Duration 90 ms    Q-T Interval 360 ms    QTC Calculation(Bazett) 454 ms    P Axis 54 degrees    R Axis -30 degrees    T Axis 12 degrees    Diagnosis Line Sinus rhythm with frequent Premature ventricular complexes  Left axis deviation  Minimal voltage criteria for LVH, may be normal variant  Abnormal ECG    Confirmed by Jevon Justice ([id number]) on 8/24/2023 6:03:22 PM (08-24-23 @ 16:17)  12 Lead ECG:   Ventricular Rate 86 BPM    Atrial Rate 86 BPM    P-R Interval 170 ms    QRS Duration 92 ms    Q-T Interval 396 ms    QTC Calculation(Bazett) 473 ms    P Axis 54 degrees    R Axis -11 degrees    T Axis 24 degrees    Diagnosis Line Sinus rhythm with frequent Premature ventricular complexes  Otherwise normal ECG    Confirmed by Jevon Justice ([id number]) on 8/24/2023 7:26:58 AM (08-24-23 @ 02:33)      MEDICATIONS  budesonide 160 MICROgram(s)/formoterol 4.5 MICROgram(s) Inhaler 2 Inhalation two times a day  cefepime   IVPB 1000 IV Intermittent every 24 hours  chlorhexidine 2% Cloths 1 Topical <User Schedule>  cloNIDine Patch 0.2 mG/24Hr(s) 1 Transdermal every 7 days  cyanocobalamin 1000 Oral daily  heparin   Injectable 5000 SubCutaneous every 12 hours  ketotifen 0.025% Ophthalmic Solution 1 Both EYES daily  lactated ringers. 1000 IV Continuous <Continuous>  multivitamin 1 Oral daily  NIFEdipine XL 30 Oral daily  pantoprazole    Tablet 40 Oral before breakfast  polyethylene glycol 3350 17 Oral two times a day  senna 2 Oral at bedtime  tamsulosin 0.4 Oral at bedtime      WEIGHT  Weight (kg): 59.9 (08-25-23 @ 17:07)  Creatinine: 1.5 mg/dL (08-28-23 @ 07:38)      ANTIBIOTICS:  cefepime   IVPB 1000 milliGRAM(s) IV Intermittent every 24 hours      All available historical records have been reviewed       RITIKA VALERO  77y, Male  Allergy: No Known Allergies      LOS  5d    CHIEF COMPLAINT: fever (27 Aug 2023 14:25)      INTERVAL EVENTS/HPI  - No acute events overnight  - T(F): , Max: 97.5 (08-27-23 @ 15:00)  - Tolerating medication  - WBC Count: 4.10 (08-28-23 @ 07:38)  WBC Count: 5.42 (08-27-23 @ 07:59)     - Creatinine: 1.5 (08-28-23 @ 07:38)  Creatinine: 1.4 (08-27-23 @ 07:59)       ROS  General: Denies rigors, nightsweats  HEENT: Denies headache, rhinorrhea, sore throat, eye pain  CV: Denies CP, palpitations  PULM: Denies wheezing, hemoptysis  GI: Denies hematemesis, hematochezia, melena  : Denies discharge, hematuria  MSK: Denies arthralgias, myalgias  SKIN: Denies rash, lesions  NEURO: Denies paresthesias, weakness  PSYCH: Denies depression, anxiety     VITALS:  T(F): 97, Max: 97.5 (08-27-23 @ 15:00)  HR: 74  BP: 119/63  RR: 18Vital Signs Last 24 Hrs  T(C): 36.1 (28 Aug 2023 07:00), Max: 36.4 (27 Aug 2023 15:00)  T(F): 97 (28 Aug 2023 07:00), Max: 97.5 (27 Aug 2023 15:00)  HR: 74 (28 Aug 2023 07:00) (62 - 74)  BP: 119/63 (28 Aug 2023 07:00) (105/57 - 121/57)  BP(mean): --  RR: 18 (28 Aug 2023 07:00) (18 - 18)  SpO2: --        PHYSICAL EXAM:  Gen: NAD, resting in bed  HEENT: Normocephalic, atraumatic  Neck: supple, no lymphadenopathy  CV: Regular rate & regular rhythm  Lungs: decreased BS at bases, no fremitus  Abdomen: Soft, BS present  Ext: Warm, well perfused  Neuro: non focal, awake  Skin: no rash, no erythema  Lines: no phlebitis     FH: Non-contributory  Social Hx: Non-contributory    TESTS & MEASUREMENTS:                        10.1   4.10  )-----------( 100      ( 28 Aug 2023 07:38 )             29.8     08-28    137  |  106  |  34<H>  ----------------------------<  92  4.9   |  21  |  1.5    Ca    8.9      28 Aug 2023 07:38  Mg     1.9     08-28    TPro  5.7<L>  /  Alb  3.0<L>  /  TBili  1.2  /  DBili  x   /  AST  66<H>  /  ALT  94<H>  /  AlkPhos  204<H>  08-28      LIVER FUNCTIONS - ( 28 Aug 2023 07:38 )  Alb: 3.0 g/dL / Pro: 5.7 g/dL / ALK PHOS: 204 U/L / ALT: 94 U/L / AST: 66 U/L / GGT: x           Urinalysis Basic - ( 28 Aug 2023 07:38 )    Color: x / Appearance: x / SG: x / pH: x  Gluc: 92 mg/dL / Ketone: x  / Bili: x / Urobili: x   Blood: x / Protein: x / Nitrite: x   Leuk Esterase: x / RBC: x / WBC x   Sq Epi: x / Non Sq Epi: x / Bacteria: x        Culture - Urine (collected 08-23-23 @ 21:05)  Source: Clean Catch Clean Catch (Midstream)  Final Report (08-24-23 @ 22:19):    <10,000 CFU/mL Normal Urogenital Janny    Culture - Blood (collected 08-23-23 @ 19:49)  Source: .Blood Blood  Preliminary Report (08-28-23 @ 02:00):    No growth at 4 days    Culture - Blood (collected 08-23-23 @ 19:49)  Source: .Blood Blood  Preliminary Report (08-28-23 @ 02:00):    No growth at 4 days        Lactate, Blood: 1.2 mmol/L (08-23-23 @ 23:09)  Lactate, Blood: 2.7 mmol/L (08-23-23 @ 13:26)      INFECTIOUS DISEASES TESTING  Rapid RVP Result: NotDetec (08-25-23 @ 07:52)  Procalcitonin, Serum: 19.00 (08-25-23 @ 07:29)  Vancomycin Level, Random: 7.5 (08-25-23 @ 07:29)  HIV-1/2 Combo Result: Nonreact (08-24-23 @ 17:31)  Procalcitonin, Serum: 0.07 (01-19-23 @ 21:15)  COVID-19 PCR: Detected (01-19-23 @ 12:00)  strept    INFLAMMATORY MARKERS      RADIOLOGY & ADDITIONAL TESTS:  I have personally reviewed the last available Chest xray  CXR  Xray Chest 1 View AP/PA:   ACC: [id number] EXAM:  XR CHEST 1 VIEW   ORDERED BY: ARIEL ZAPATA     PROCEDURE DATE:  08/25/2023          INTERPRETATION:  Clinical History / Reason for exam: Concern for pneumonia    Comparison : Chest radiograph 2 days prior.    Technique/Positioning: AP frontal.    Findings:    Support devices: None.    Cardiac/mediastinum/hilum: Atherosclerotic aorta, stable.    Lung parenchyma/Pleura: Stable bibasilar opacities. Right upper lobe   fibrosis and bilateral pleural plaques which are better seen in prior CT.   A linear opacity is noted along the right lateral lung field consistent   with a thickened pleural stripe.    Skeleton/soft tissues: Unchanged.    Impression:    Stable bibasilar opacities.    --- End of Report ---          ANYA GONSALEZ; Resident Radiologist  This document has been electronically signed.  ELIER VIZCAINO MD; Attending Interventional Radiologist  This document has been electronically signed. Aug 25 2023  5:09PM (08-25-23 @ 15:47)      CT      CARDIOLOGY TESTING  12 Lead ECG:   Ventricular Rate 96 BPM    Atrial Rate 96 BPM    P-R Interval 154 ms    QRS Duration 90 ms    Q-T Interval 360 ms    QTC Calculation(Bazett) 454 ms    P Axis 54 degrees    R Axis -30 degrees    T Axis 12 degrees    Diagnosis Line Sinus rhythm with frequent Premature ventricular complexes  Left axis deviation  Minimal voltage criteria for LVH, may be normal variant  Abnormal ECG    Confirmed by Jevon Justice ([id number]) on 8/24/2023 6:03:22 PM (08-24-23 @ 16:17)  12 Lead ECG:   Ventricular Rate 86 BPM    Atrial Rate 86 BPM    P-R Interval 170 ms    QRS Duration 92 ms    Q-T Interval 396 ms    QTC Calculation(Bazett) 473 ms    P Axis 54 degrees    R Axis -11 degrees    T Axis 24 degrees    Diagnosis Line Sinus rhythm with frequent Premature ventricular complexes  Otherwise normal ECG    Confirmed by Jevon Justice ([id number]) on 8/24/2023 7:26:58 AM (08-24-23 @ 02:33)      MEDICATIONS  budesonide 160 MICROgram(s)/formoterol 4.5 MICROgram(s) Inhaler 2 Inhalation two times a day  cefepime   IVPB 1000 IV Intermittent every 24 hours  chlorhexidine 2% Cloths 1 Topical <User Schedule>  cloNIDine Patch 0.2 mG/24Hr(s) 1 Transdermal every 7 days  cyanocobalamin 1000 Oral daily  heparin   Injectable 5000 SubCutaneous every 12 hours  ketotifen 0.025% Ophthalmic Solution 1 Both EYES daily  lactated ringers. 1000 IV Continuous <Continuous>  multivitamin 1 Oral daily  NIFEdipine XL 30 Oral daily  pantoprazole    Tablet 40 Oral before breakfast  polyethylene glycol 3350 17 Oral two times a day  senna 2 Oral at bedtime  tamsulosin 0.4 Oral at bedtime      WEIGHT  Weight (kg): 59.9 (08-25-23 @ 17:07)  Creatinine: 1.5 mg/dL (08-28-23 @ 07:38)      ANTIBIOTICS:  cefepime   IVPB 1000 milliGRAM(s) IV Intermittent every 24 hours      All available historical records have been reviewed

## 2023-08-28 NOTE — PROGRESS NOTE ADULT - ASSESSMENT
ASSESSMENT  76 yo Cantonese speaking  male ( used) with a pmh of CHF unknown ef, hypertension, hyperlipidemia, CAD, CKD, CVA 8 years ago, history of GI bleed per wife??? presenting for c/o n/v/subjective fevers that started this morning. pt has experienced 5+ episodes of NBNB emesis and abdominal and chest pain. + constipation.    IMPRESSION  #Severe sepsis on admission T>101F, Pulse>90 WBC 16 lactic acidosis, suspect biliary source  ERCP with biliary sphincterotomy.  A 10 F x 7 cm plastic CBD stent was placed with adequate drainage. A 5 F x 4 cm plastic pancreatic stent was placed      8/23 Blood & Urine cxs NGTD   #Transaminitis  with elevated tbili  #Thrombocytopenia/ Anemia     parasite smear NEGATIVE     CXR Irregular right apical pleural thickening redemonstrated, appears   increased compared to prior. Somewhat patchy right upper lobe opacities   also noted. Right pleural effusion. Recommend chest CT evaluation.    CT No evidence of acute abdominal or pelvic inflammatory process.  Mild lobulated contour of the liver. Early cirrhosis should be considered.  CT Chest Stable fibrotic changes are again noted in the right upper lobe.   Unchanged focal area of consolidation/atelectasis at the left lung base.   Small right pleural effusion. Bilateral calcified pleural plaques.   Calcified granulomas are noted within the left upper lobe.  < from: US Abdomen Upper Quadrant Right (08.23.23 @ 19:50) >  Cholelithiasis without sonographic evidence of acute cholecystitis.  Echogenic gallbladder focus measuring up to 7 mm, difficult to   differentiate between stone and polyp. Recommend sonographic follow-up.  Nodular contour of liver as seen on CT, suggestive of cirrhosis.    Hep B/C NR   #Abx allergy: No Known Allergies  #KOBE  1.5 Cr  crcl 35  Height (cm): 162.6 (08-23-23 @ 11:36)  Weight (kg): 59.9 (08-23-23 @ 11:36)    RECOMMENDATIONS  - Cefepime 1g q24h IV   - ADD PO flagyl 500mg TID   - D/C on PO levaquin renally dosed (500mg every other day)/ flagyl 500mg TID x 7 days post procedure) end 9/1  - Please recall ID PRN. Please inform ID of any patient clinical change or any new pertinent laboratory or radiographic data     If any questions, please send a message or call on Go[employer]  Please continue to update ID with any pertinent new laboratory or radiographic findings

## 2023-08-28 NOTE — DISCHARGE NOTE PROVIDER - PROVIDER TOKENS
PROVIDER:[TOKEN:[[id number]],FOLLOWUP:[2 weeks]],PROVIDER:[TOKEN:[[id number]],FOLLOWUP:[2 weeks]]

## 2023-08-28 NOTE — DISCHARGE NOTE PROVIDER - ATTENDING DISCHARGE PHYSICAL EXAMINATION:
Vital Signs Last 24 Hrs  T(F): 97.6 (28 Aug 2023 15:00), Max: 97.6 (28 Aug 2023 15:00)  HR: 64 (28 Aug 2023 15:00) (62 - 74)  BP: 133/61 (28 Aug 2023 15:00) (119/63 - 133/61)  RR: 18 (28 Aug 2023 15:00) (18 - 18)  SpO2: --    PHYSICAL EXAM:  GENERAL: NAD, well-groomed, well-developed  HEAD:  Atraumatic, Normocephalic  EYES: EOMI, conjunctiva and sclera clear  ENMT: Moist mucous membranes, Good dentition, no thrush  NECK: Supple, No JVD  CHEST/LUNG: Clear to auscultation bilaterally, good air entry, non-labored breathing  HEART: RRR; S1/S2, 2/6 systolic murmur   ABDOMEN: Soft, Nontender, Nondistended; Bowel sounds present  VASCULAR: Normal pulses, Normal capillary refill  EXTREMITIES: No calf tenderness, No cyanosis, No edema  LYMPH: Normal; No lymphadenopathy noted  SKIN: Warm, Intact  PSYCH: Normal mood, Normal affect  NERVOUS SYSTEM:  A/O x3, Good concentration

## 2023-08-28 NOTE — DISCHARGE NOTE NURSING/CASE MANAGEMENT/SOCIAL WORK - PATIENT PORTAL LINK FT
You can access the FollowMyHealth Patient Portal offered by [facility] by registering at the following website: [email or web address]. By joining [facility]’s FollowMyHealth portal, you will also be able to view your health information using other applications (apps) compatible with our system.

## 2023-08-28 NOTE — DISCHARGE NOTE PROVIDER - CARE PROVIDERS DIRECT ADDRESSES
[email or web address] [email or web address] [email or web address]. [email or web address]

## 2023-08-28 NOTE — DISCHARGE NOTE PROVIDER - CARE PROVIDER_API CALL
Germán Lewis  Internal Medicine  [address] [place] [address]  Phone: [phone]  Fax: [phone]  Follow Up Time: 2 weeks    Devora Williamson  Gastroenterology  [address] [place] [address]  Phone: [phone]  Fax: [phone]  Follow Up Time: 2 weeks

## 2023-08-28 NOTE — DISCHARGE NOTE PROVIDER - NSDCMRMEDTOKEN_GEN_ALL_CORE_FT
atorvastatin 10 mg oral tablet: 1 orally once a day  azelastine 0.05% ophthalmic solution: 1 in each affected eye once a day  Breztri Aerosphere 160 mcg-9 mcg-4.8 mcg/inh inhalation aerosol: 2 puff(s) inhaled 2 times a day  cloNIDine 0.2 mg/24 hr transdermal film, extended release: 1 transdermally once a week  docusate sodium 100 mg oral tablet: 1 orally 3 times a day  Edarbi 80 mg oral tablet: 1 orally once a day  Farxiga 10 mg oral tablet: 1 orally once a day  febuxostat 80 mg oral tablet: 1 orally once a day  ferrous sulfate 325 mg (65 mg elemental iron) oral delayed release tablet: 1 orally once a day  Multiple Vitamins oral tablet: 1 orally once a day  Nephplex Rx oral tablet: 1 orally once a day  tamsulosin 0.4 mg oral capsule: 1 orally once a day (at bedtime)  vericiguat 2.5 mg oral tablet: 1 orally once a day  Vitamin B12 500 mcg oral tablet:    atorvastatin 10 mg oral tablet: 1 orally once a day  azelastine 0.05% ophthalmic solution: 1 in each affected eye once a day  Breztri Aerosphere 160 mcg-9 mcg-4.8 mcg/inh inhalation aerosol: 2 puff(s) inhaled 2 times a day  cloNIDine 0.2 mg/24 hr transdermal film, extended release: 1 transdermally once a week  docusate sodium 100 mg oral tablet: 1 orally 3 times a day  Edarbi 80 mg oral tablet: 1 orally once a day  Farxiga 10 mg oral tablet: 1 orally once a day  febuxostat 80 mg oral tablet: 1 orally once a day  ferrous sulfate 325 mg (65 mg elemental iron) oral delayed release tablet: 1 orally once a day  Multiple Vitamins oral tablet: 1 orally once a day  Nephplex Rx oral tablet: 1 orally once a day  pantoprazole 40 mg oral delayed release tablet: 1 tab(s) orally once a day (before a meal)  polyethylene glycol 3350 oral powder for reconstitution: 17 gram(s) orally 2 times a day  senna leaf extract oral tablet: 2 tab(s) orally once a day (at bedtime)  tamsulosin 0.4 mg oral capsule: 1 orally once a day (at bedtime)  vericiguat 2.5 mg oral tablet: 1 orally once a day  Vitamin B12 500 mcg oral tablet:

## 2023-08-28 NOTE — DISCHARGE NOTE PROVIDER - HOSPITAL COURSE
Patient is a 78 yo Cantonese speaking male ( used) with a pmh of CHF unknown EF, hypertension, hyperlipidemia, CAD, CKD, CVA 8 years ago, history of GI bleed per wife,  presenting for c/o n/v/subjective fevers that started the morning of admission. Patient experienced 5+ episodes of NBNB emesis and abdominal and chest pain, constipation. Denied diarrhea, sore throat, runny nose. reports worsening difficulty urinating over past day. In the ED he was febrile to 101, tachycardic to 110, and normotensive to 110/60. RUQ US showed Cholelithiasis without sonographic evidence of acute cholecystitis. CT chest, abdom, pelvis showed no evidence of acute abdominal or pelvic inflammatory process.  Mild lobulated contour of the liver. Early cirrhosis should be considered. CXR showed Irregular right apical pleural thickening redemonstrated, appears increased compared to prior. Somewhat patchy right upper lobe opacities also noted. Right pleural effusion. Patient's labs showed wbc 16.59, hg 9.8, mcv 95.8, k 6, cl 111, co2 14, bun 35, cr 2.3, br 2.2, br direct 2,alk phos 158, ast 381, alt 319, lipase 66, lactate 2.7 improved to 1.2, trop negative. ABG ph 7.28, pco2 30, hco3 14. Received vanc / cefepime and 2.25 L bolus in ED. Patient was admitted to medicine for cholangitis.    On the floors patient was treated with IV cefepime for 4 days, underwent ERCP on 8/25, during which stones were removed and a 10 F x 7 cm plastic CBD stent was placed with adequate drainage. A 5 F x 4 cm plastic pancreatic stent was placed. LFTs improved, increased diet as tolerated. His lobulated liver is to be evaluated outpatient. ID evaluated him and advised PO levaquin renally dosed (500mg every other day)/ flagyl 500mg TID x 7 days post procedure) ending 9/1. A TB workup for patchy RUL opacities was begun however per ID there is no need to continue inpatient or outpatient. Patient is a 76 yo Cantonese speaking male ( used) with a pmh of CHF unknown EF, hypertension, hyperlipidemia, CAD, CKD, CVA 8 years ago, history of GI bleed per wife,  presenting for c/o n/v/subjective fevers that started the morning of admission. Patient experienced 5+ episodes of NBNB emesis and abdominal and chest pain, constipation. Denied diarrhea, sore throat, runny nose. reports worsening difficulty urinating over past day. In the ED he was febrile to 101, tachycardic to 110, and normotensive to 110/60. RUQ US showed Cholelithiasis without sonographic evidence of acute cholecystitis. CT chest, abdom, pelvis showed no evidence of acute abdominal or pelvic inflammatory process.  Mild lobulated contour of the liver. Early cirrhosis should be considered. CXR showed Irregular right apical pleural thickening redemonstrated, appears increased compared to prior. Somewhat patchy right upper lobe opacities also noted. Right pleural effusion. Patient's labs showed wbc 16.59, hg 9.8, mcv 95.8, k 6, cl 111, co2 14, bun 35, cr 2.3, br 2.2, br direct 2,alk phos 158, ast 381, alt 319, lipase 66, lactate 2.7 improved to 1.2, trop negative. ABG ph 7.28, pco2 30, hco3 14. Received vanc / cefepime and 2.25 L bolus in ED. Patient was admitted to medicine for cholangitis.    On the floors the patient was treated for cholangitis with IV cefepime for 4 days, underwent ERCP on 8/25,   during which stones were removed and a 10 F x 7 cm plastic CBD stent was placed in the cbd and a 5 F x 4 cm plastic pancreatic stent was placed. LFTs improved, increased diet as tolerated. His lobulated liver is to be evaluated outpatient. ID evaluated him and advised PO levaquin renally dosed (500mg every other day)/ flagyl 500mg TID x 7 days post procedure) ending 9/1. A TB workup for patchy RUL opacities was begun however per ID there is no need to continue inpatient or outpatient workup.     Patient is being discharged to subacute rehab.

## 2023-08-29 LAB
CULTURE RESULTS: SIGNIFICANT CHANGE UP
CULTURE RESULTS: SIGNIFICANT CHANGE UP
GAMMA INTERFERON BACKGROUND BLD IA-ACNC: 0.16 IU/ML — SIGNIFICANT CHANGE UP
M TB IFN-G BLD-IMP: NEGATIVE — SIGNIFICANT CHANGE UP
M TB IFN-G CD4+ BCKGRND COR BLD-ACNC: 0.06 IU/ML — SIGNIFICANT CHANGE UP
M TB IFN-G CD4+CD8+ BCKGRND COR BLD-ACNC: 0.05 IU/ML — SIGNIFICANT CHANGE UP
QUANT TB PLUS MITOGEN MINUS NIL: 1.8 IU/ML — SIGNIFICANT CHANGE UP
SPECIMEN SOURCE: SIGNIFICANT CHANGE UP
SPECIMEN SOURCE: SIGNIFICANT CHANGE UP

## 2023-09-07 DIAGNOSIS — D69.6 THROMBOCYTOPENIA, UNSPECIFIED: ICD-10-CM

## 2023-09-07 DIAGNOSIS — N17.9 ACUTE KIDNEY FAILURE, UNSPECIFIED: ICD-10-CM

## 2023-09-07 DIAGNOSIS — D63.1 ANEMIA IN CHRONIC KIDNEY DISEASE: ICD-10-CM

## 2023-09-07 DIAGNOSIS — R65.20 SEVERE SEPSIS WITHOUT SEPTIC SHOCK: ICD-10-CM

## 2023-09-07 DIAGNOSIS — K59.00 CONSTIPATION, UNSPECIFIED: ICD-10-CM

## 2023-09-07 DIAGNOSIS — E87.20 ACIDOSIS, UNSPECIFIED: ICD-10-CM

## 2023-09-07 DIAGNOSIS — E78.5 HYPERLIPIDEMIA, UNSPECIFIED: ICD-10-CM

## 2023-09-07 DIAGNOSIS — I50.9 HEART FAILURE, UNSPECIFIED: ICD-10-CM

## 2023-09-07 DIAGNOSIS — Z86.73 PERSONAL HISTORY OF TRANSIENT ISCHEMIC ATTACK (TIA), AND CEREBRAL INFARCTION WITHOUT RESIDUAL DEFICITS: ICD-10-CM

## 2023-09-07 DIAGNOSIS — K80.42 CALCULUS OF BILE DUCT WITH ACUTE CHOLECYSTITIS WITHOUT OBSTRUCTION: ICD-10-CM

## 2023-09-07 DIAGNOSIS — I25.10 ATHEROSCLEROTIC HEART DISEASE OF NATIVE CORONARY ARTERY WITHOUT ANGINA PECTORIS: ICD-10-CM

## 2023-09-07 DIAGNOSIS — R74.01 ELEVATION OF LEVELS OF LIVER TRANSAMINASE LEVELS: ICD-10-CM

## 2023-09-07 DIAGNOSIS — A41.9 SEPSIS, UNSPECIFIED ORGANISM: ICD-10-CM

## 2023-09-07 DIAGNOSIS — N18.32 CHRONIC KIDNEY DISEASE, STAGE 3B: ICD-10-CM

## 2023-09-07 DIAGNOSIS — I13.0 HYPERTENSIVE HEART AND CHRONIC KIDNEY DISEASE WITH HEART FAILURE AND STAGE 1 THROUGH STAGE 4 CHRONIC KIDNEY DISEASE, OR UNSPECIFIED CHRONIC KIDNEY DISEASE: ICD-10-CM

## 2023-09-07 DIAGNOSIS — K74.60 UNSPECIFIED CIRRHOSIS OF LIVER: ICD-10-CM

## 2023-10-19 ENCOUNTER — OUTPATIENT (OUTPATIENT)
Dept: OUTPATIENT SERVICES | Facility: HOSPITAL | Age: 78
LOS: 1 days | End: 2023-10-19

## 2023-10-19 ENCOUNTER — APPOINTMENT (OUTPATIENT)
Dept: INTERNAL MEDICINE | Facility: CLINIC | Age: 78
End: 2023-10-19

## 2023-10-19 VITALS
HEART RATE: 78 BPM | WEIGHT: 125 LBS | HEIGHT: 60 IN | BODY MASS INDEX: 24.54 KG/M2 | SYSTOLIC BLOOD PRESSURE: 147 MMHG | TEMPERATURE: 97.2 F | DIASTOLIC BLOOD PRESSURE: 80 MMHG | OXYGEN SATURATION: 99 %

## 2023-10-19 DIAGNOSIS — Z00.00 ENCOUNTER FOR GENERAL ADULT MEDICAL EXAMINATION W/OUT ABNORMAL FINDINGS: ICD-10-CM

## 2023-10-19 DIAGNOSIS — Z00.00 ENCOUNTER FOR GENERAL ADULT MEDICAL EXAMINATION WITHOUT ABNORMAL FINDINGS: ICD-10-CM

## 2023-12-25 NOTE — PROGRESS NOTE ADULT - PROVIDER SPECIALTY LIST ADULT
Critical Care
Hospitalist
Internal Medicine
Yes

## 2024-01-12 ENCOUNTER — APPOINTMENT (OUTPATIENT)
Dept: GASTROENTEROLOGY | Facility: CLINIC | Age: 79
End: 2024-01-12

## 2024-01-31 ENCOUNTER — APPOINTMENT (OUTPATIENT)
Dept: GASTROENTEROLOGY | Facility: CLINIC | Age: 79
End: 2024-01-31

## 2024-01-31 PROCEDURE — 99441: CPT | Mod: 93

## 2024-02-22 NOTE — HISTORY OF PRESENT ILLNESS
[Home] : at home, [unfilled] , at the time of the visit. [Medical Office: ([facility])___] : at the medical office located in  [Verbal consent obtained from patient] : the patient, [unfilled] [FreeTextEntry4] : Merlin [FreeTextEntry1] : Patient is a 78-year-old male with recent admission [de-identified] : 8/25/23

## 2024-03-15 ENCOUNTER — OUTPATIENT (OUTPATIENT)
Dept: OUTPATIENT SERVICES | Facility: HOSPITAL | Age: 79
LOS: 1 days | Discharge: HOME | End: 2024-03-15
Payer: MEDICARE

## 2024-03-15 ENCOUNTER — TRANSCRIPTION ENCOUNTER (OUTPATIENT)
Age: 79
End: 2024-03-15

## 2024-03-15 VITALS
HEART RATE: 41 BPM | RESPIRATION RATE: 18 BRPM | WEIGHT: 130.07 LBS | TEMPERATURE: 99 F | HEIGHT: 63 IN | DIASTOLIC BLOOD PRESSURE: 60 MMHG | SYSTOLIC BLOOD PRESSURE: 164 MMHG

## 2024-03-15 VITALS
RESPIRATION RATE: 18 BRPM | OXYGEN SATURATION: 97 % | SYSTOLIC BLOOD PRESSURE: 162 MMHG | HEART RATE: 75 BPM | DIASTOLIC BLOOD PRESSURE: 72 MMHG

## 2024-03-15 DIAGNOSIS — R10.9 UNSPECIFIED ABDOMINAL PAIN: ICD-10-CM

## 2024-03-15 DIAGNOSIS — K21.9 GASTRO-ESOPHAGEAL REFLUX DISEASE WITHOUT ESOPHAGITIS: ICD-10-CM

## 2024-03-15 PROCEDURE — 43275 ERCP REMOVE FORGN BODY DUCT: CPT

## 2024-03-15 PROCEDURE — 74018 RADEX ABDOMEN 1 VIEW: CPT

## 2024-03-15 PROCEDURE — C1889: CPT

## 2024-03-15 PROCEDURE — 74328 X-RAY BILE DUCT ENDOSCOPY: CPT | Mod: 26

## 2024-03-15 PROCEDURE — 43264 ERCP REMOVE DUCT CALCULI: CPT | Mod: XU

## 2024-03-15 PROCEDURE — C1769: CPT

## 2024-03-15 RX ORDER — DAPAGLIFLOZIN 10 MG/1
1 TABLET, FILM COATED ORAL
Refills: 0 | DISCHARGE

## 2024-03-15 RX ORDER — VERICIGUAT 2.5 MG/1
1 TABLET, FILM COATED ORAL
Refills: 0 | DISCHARGE

## 2024-03-15 RX ORDER — TAMSULOSIN HYDROCHLORIDE 0.4 MG/1
1 CAPSULE ORAL
Refills: 0 | DISCHARGE

## 2024-03-15 RX ORDER — FEBUXOSTAT 40 MG/1
1 TABLET ORAL
Refills: 0 | DISCHARGE

## 2024-03-15 RX ORDER — ATORVASTATIN CALCIUM 80 MG/1
1 TABLET, FILM COATED ORAL
Refills: 0 | DISCHARGE

## 2024-03-15 RX ORDER — DOCUSATE SODIUM 100 MG
1 CAPSULE ORAL
Refills: 0 | DISCHARGE

## 2024-03-15 RX ORDER — AZILSARTAN KAMEDOXOMIL 40 MG/1
1 TABLET ORAL
Refills: 0 | DISCHARGE

## 2024-03-15 RX ORDER — FERROUS SULFATE 325(65) MG
1 TABLET ORAL
Refills: 0 | DISCHARGE

## 2024-03-15 RX ORDER — PREGABALIN 225 MG/1
1 CAPSULE ORAL
Qty: 0 | Refills: 0 | DISCHARGE

## 2024-03-15 RX ORDER — AZELASTINE HCL 0.05 %
1 DROPS OPHTHALMIC (EYE)
Refills: 0 | DISCHARGE

## 2024-03-15 NOTE — ASU DISCHARGE PLAN (ADULT/PEDIATRIC) - CARE PROVIDER_API CALL
Anup Castillo  Gastroenterology  [address] [place] [address]  Phone: [phone]  Fax: [phone]  Follow Up Time:

## 2024-03-15 NOTE — ASU PATIENT PROFILE, ADULT - FALL HARM RISK - HARM RISK INTERVENTIONS

## 2024-03-15 NOTE — ASU DISCHARGE PLAN (ADULT/PEDIATRIC) - NS MD DC FALL RISK RISK
For information on Fall & Injury Prevention, visit: [email or web address] OR  [email or web address] OR  https://www.cdc.gov/steadi/patient.html

## 2024-03-21 DIAGNOSIS — Z46.59 ENCOUNTER FOR FITTING AND ADJUSTMENT OF OTHER GASTROINTESTINAL APPLIANCE AND DEVICE: ICD-10-CM

## 2024-03-21 DIAGNOSIS — K83.8 OTHER SPECIFIED DISEASES OF BILIARY TRACT: ICD-10-CM

## 2024-03-21 DIAGNOSIS — I50.9 HEART FAILURE, UNSPECIFIED: ICD-10-CM

## 2024-03-21 DIAGNOSIS — N18.9 CHRONIC KIDNEY DISEASE, UNSPECIFIED: ICD-10-CM

## 2024-03-21 DIAGNOSIS — I13.0 HYPERTENSIVE HEART AND CHRONIC KIDNEY DISEASE WITH HEART FAILURE AND STAGE 1 THROUGH STAGE 4 CHRONIC KIDNEY DISEASE, OR UNSPECIFIED CHRONIC KIDNEY DISEASE: ICD-10-CM

## 2024-03-21 DIAGNOSIS — Z86.73 PERSONAL HISTORY OF TRANSIENT ISCHEMIC ATTACK (TIA), AND CEREBRAL INFARCTION WITHOUT RESIDUAL DEFICITS: ICD-10-CM

## 2024-03-21 DIAGNOSIS — K57.11 DIVERTICULOSIS OF SMALL INTESTINE WITHOUT PERFORATION OR ABSCESS WITH BLEEDING: ICD-10-CM

## 2024-03-21 DIAGNOSIS — I25.10 ATHEROSCLEROTIC HEART DISEASE OF NATIVE CORONARY ARTERY WITHOUT ANGINA PECTORIS: ICD-10-CM

## 2024-03-27 ENCOUNTER — APPOINTMENT (OUTPATIENT)
Dept: GASTROENTEROLOGY | Facility: CLINIC | Age: 79
End: 2024-03-27
Payer: MEDICARE

## 2024-03-27 DIAGNOSIS — Z86.79 PERSONAL HISTORY OF OTHER DISEASES OF THE CIRCULATORY SYSTEM: ICD-10-CM

## 2024-03-27 DIAGNOSIS — K80.50 CALCULUS OF BILE DUCT W/OUT CHOLANGITIS OR CHOLECYSTITIS W/OUT OBSTRUCTION: ICD-10-CM

## 2024-03-27 DIAGNOSIS — Z78.9 OTHER SPECIFIED HEALTH STATUS: ICD-10-CM

## 2024-03-27 DIAGNOSIS — Z87.448 PERSONAL HISTORY OF OTHER DISEASES OF URINARY SYSTEM: ICD-10-CM

## 2024-03-27 PROCEDURE — 99442: CPT | Mod: 93

## 2024-03-27 RX ORDER — BUDESONIDE, GLYCOPYRROLATE, AND FORMOTEROL FUMARATE 160; 9; 4.8 UG/1; UG/1; UG/1
160-9-4.8 AEROSOL, METERED RESPIRATORY (INHALATION)
Refills: 0 | Status: ACTIVE | COMMUNITY

## 2024-03-27 RX ORDER — CYANOCOBALAMIN 1000 UG/ML
1000 INJECTION INTRAMUSCULAR; SUBCUTANEOUS
Refills: 0 | Status: ACTIVE | COMMUNITY

## 2024-03-27 RX ORDER — VERICIGUAT 2.5 MG/1
2.5 TABLET, FILM COATED ORAL
Refills: 0 | Status: ACTIVE | COMMUNITY

## 2024-03-27 RX ORDER — OMEGA-3/DHA/EPA/FISH OIL 300-1000MG
400 CAPSULE ORAL
Refills: 0 | Status: ACTIVE | COMMUNITY

## 2024-03-27 RX ORDER — MULTI VITAMIN/MINERAL SUPPLEMENT WITH ASCORBIC ACID, NIACIN, PYRIDOXINE, PANTOTHENIC ACID, FOLIC ACID, RIBOFLAVIN, THIAMIN, BIOTIN, COBALAMIN AND ZINC. 60; 20; 12.5; 10; 10; 1.7; 1.5; 1; .3; .006 MG/1; MG/1; MG/1; MG/1; MG/1; MG/1; MG/1; MG/1; MG/1; MG/1
TABLET, COATED ORAL
Refills: 0 | Status: ACTIVE | COMMUNITY

## 2024-03-27 RX ORDER — FERROUS SULFATE 325(65) MG
324 TABLET ORAL
Refills: 0 | Status: ACTIVE | COMMUNITY

## 2024-03-27 RX ORDER — DOCUSATE SODIUM 100 MG
100 TABLET ORAL
Refills: 0 | Status: ACTIVE | COMMUNITY

## 2024-03-27 RX ORDER — DAPAGLIFLOZIN 10 MG/1
10 TABLET, FILM COATED ORAL
Refills: 0 | Status: ACTIVE | COMMUNITY

## 2024-03-28 NOTE — REASON FOR VISIT
[Home] : at home, [unfilled] , at the time of the visit. [Medical Office: ([facility])___] : at the medical office located in  [Post-op/Procedure: _________] : a [unfilled] post-op/procedure visit [FreeTextEntry3] : Estuardo Middleton [FreeTextEntry4] : Merlin [FreeTextEntry1] : Cantonese

## 2024-03-28 NOTE — HISTORY OF PRESENT ILLNESS
[FreeTextEntry1] : 78 yr old male with HTN, CKD, with hospitalization 8/23 with choledocholithiasis, S/P ERCP with stent placement, S/P ERCP with stent removal on 3/15/24 for F/U today. [employer]  was used today, ID# [id number], Joleen for the medical visit. Biliary stent was removed, cholangiogram revealed multiple filling defects in the CBD so biliary sphincterotomy was extended and then multiple CBD stones and sludge were removed from the CBD. He stated that he had felt weak initially post procedure but that went away. He denied abdominal pain,  fevers, vomiting, jaundice, or weight loss post procedure.  [de-identified] : 3/15/24

## 2024-03-28 NOTE — ASSESSMENT
[FreeTextEntry1] : 78 yr old male with hospitalization 8/23 with choledocholithiasis, S/P ERCP with stent placement, S/P ERCP with stent removal on 3/15/24 for F/U today. [employer] [employer]  was used today, ID# [id number], Joleen. for the medical visit. Biliary stent was removed, cholangiogram revealed multiple filling defect in the CBD so biliary sphincterotomy was extended and then multiple CBD stones and sludge were removed from the CBD. He stated that he had felt weak initially post procedure but that went away. He denied abdominal pain,  fevers, vomiting, jaundice, weight loss post procedure.    H/O choledocholithiasis, S/P ERCP with stent removal - Patient doing well post procedure; ERCP findings discussed with patient today via  - F/U prn

## 2024-04-25 NOTE — PHYSICAL THERAPY INITIAL EVALUATION ADULT - MANUAL MUSCLE TESTING RESULTS, REHAB EVAL
Simon Cheung is a 80 y.o. male , id x 2(name and ). Reviewed record, history, and  medications.      Chief Complaint   Patient presents with    Shortness of Breath     Patient c/o sob, 3 months, progressive over the last week.    Patient does not have a cardiologist.      Vitals:    24 1611   BP: (!) 150/93   Site: Left Upper Arm   Position: Sitting   Cuff Size: Medium Adult   Pulse: 96   Resp: 20   Temp: 98.4 °F (36.9 °C)   TempSrc: Oral   SpO2: 100%   Weight: 125.6 kg (276 lb 12.8 oz)   Height: 1.829 m (6')                 2024     4:16 PM   PHQ-9    Little interest or pleasure in doing things 0   Feeling down, depressed, or hopeless 0   PHQ-2 Score 0   PHQ-9 Total Score 0            No data to display                  Social Determinants of Health     Tobacco Use: Medium Risk (2024)    Patient History     Smoking Tobacco Use: Former     Smokeless Tobacco Use: Never     Passive Exposure: Not on file   Alcohol Use: Not on file   Financial Resource Strain: Low Risk  (2024)    Overall Financial Resource Strain (CARDIA)     Difficulty of Paying Living Expenses: Not hard at all   Food Insecurity: No Food Insecurity (2024)    Hunger Vital Sign     Worried About Running Out of Food in the Last Year: Never true     Ran Out of Food in the Last Year: Never true   Transportation Needs: Unknown (2024)    PRAPARE - Transportation     Lack of Transportation (Medical): Not on file     Lack of Transportation (Non-Medical): No   Physical Activity: Inactive (3/18/2024)    Exercise Vital Sign     Days of Exercise per Week: 0 days     Minutes of Exercise per Session: 0 min   Stress: Not on file   Social Connections: Not on file   Intimate Partner Violence: Not on file   Depression: Not at risk (3/18/2024)    PHQ-2     PHQ-2 Score: 0   Housing Stability: Unknown (2024)    Housing Stability Vital Sign     Unable to Pay for Housing in the Last Year: Not on file     Number of Places Lived in the  BLE 4/5/grossly assessed due to

## 2024-10-07 PROBLEM — K80.50 CHOLEDOCHOLITHIASIS: Status: ACTIVE | Noted: 2024-10-07

## 2024-12-12 ENCOUNTER — NON-APPOINTMENT (OUTPATIENT)
Age: 79
End: 2024-12-12

## 2025-05-05 NOTE — PATIENT PROFILE ADULT - NSPRONUTRITIONRISK_GEN_A_NUR
No indicators present Normal vision: sees adequately in most situations; can see medication labels, newsprint